# Patient Record
Sex: FEMALE
[De-identification: names, ages, dates, MRNs, and addresses within clinical notes are randomized per-mention and may not be internally consistent; named-entity substitution may affect disease eponyms.]

---

## 2020-01-08 ENCOUNTER — RX ONLY (OUTPATIENT)
Age: 80
Setting detail: RX ONLY
End: 2020-01-08

## 2021-03-03 PROBLEM — Z12.11 COLON CANCER SCREENING: Status: ACTIVE | Noted: 2021-03-03

## 2021-03-03 PROBLEM — Z00.00 MEDICARE ANNUAL WELLNESS VISIT, SUBSEQUENT: Status: ACTIVE | Noted: 2021-03-03

## 2021-03-03 PROBLEM — Z23 ENCOUNTER FOR IMMUNIZATION: Status: ACTIVE | Noted: 2021-03-03

## 2021-04-02 PROBLEM — Z00.00 MEDICARE ANNUAL WELLNESS VISIT, SUBSEQUENT: Status: RESOLVED | Noted: 2021-03-03 | Resolved: 2021-04-02

## 2021-06-03 PROBLEM — Z12.39 BREAST CANCER SCREENING: Status: ACTIVE | Noted: 2021-06-03

## 2021-06-03 PROBLEM — I87.2 VENOUS INSUFFICIENCY OF BOTH LOWER EXTREMITIES: Status: ACTIVE | Noted: 2021-06-03

## 2021-07-08 ENCOUNTER — HOSPITAL ENCOUNTER (OUTPATIENT)
Dept: MAMMOGRAPHY | Age: 81
Discharge: HOME | End: 2021-07-08
Attending: INTERNAL MEDICINE
Payer: MEDICARE

## 2021-07-08 DIAGNOSIS — Z12.31 ENCOUNTER FOR SCREENING MAMMOGRAM FOR MALIGNANT NEOPLASM OF BREAST: ICD-10-CM

## 2021-07-08 PROCEDURE — 77063 BREAST TOMOSYNTHESIS BI: CPT

## 2021-12-21 ENCOUNTER — RX ONLY (OUTPATIENT)
Age: 81
Setting detail: RX ONLY
End: 2021-12-21

## 2022-03-18 PROBLEM — Z23 ENCOUNTER FOR IMMUNIZATION: Status: ACTIVE | Noted: 2021-03-03

## 2022-03-18 PROBLEM — Z12.11 COLON CANCER SCREENING: Status: ACTIVE | Noted: 2021-03-03

## 2022-03-19 PROBLEM — I87.2 VENOUS INSUFFICIENCY OF BOTH LOWER EXTREMITIES: Status: ACTIVE | Noted: 2021-06-03

## 2022-03-20 PROBLEM — Z12.39 BREAST CANCER SCREENING: Status: ACTIVE | Noted: 2021-06-03

## 2022-04-11 PROBLEM — Z00.00 MEDICARE ANNUAL WELLNESS VISIT, SUBSEQUENT: Status: ACTIVE | Noted: 2021-03-03

## 2022-04-28 ENCOUNTER — HOSPITAL ENCOUNTER (OUTPATIENT)
Dept: GENERAL RADIOLOGY | Age: 82
Discharge: HOME | End: 2022-04-28
Payer: MEDICARE

## 2022-04-28 DIAGNOSIS — G89.29 CHRONIC BILATERAL LOW BACK PAIN WITHOUT SCIATICA: ICD-10-CM

## 2022-04-28 DIAGNOSIS — M54.50 CHRONIC BILATERAL LOW BACK PAIN WITHOUT SCIATICA: ICD-10-CM

## 2022-04-28 PROCEDURE — 72100 X-RAY EXAM L-S SPINE 2/3 VWS: CPT

## 2022-05-02 ENCOUNTER — HOSPITAL ENCOUNTER (OUTPATIENT)
Dept: PHYSICAL THERAPY | Age: 82
Setting detail: RECURRING SERIES
Discharge: HOME | End: 2022-05-05
Payer: MEDICARE

## 2022-05-03 DIAGNOSIS — E03.9 ACQUIRED HYPOTHYROIDISM: ICD-10-CM

## 2022-05-03 DIAGNOSIS — E78.2 MIXED HYPERLIPIDEMIA: Primary | ICD-10-CM

## 2022-05-03 DIAGNOSIS — E11.9 TYPE 2 DIABETES MELLITUS WITHOUT COMPLICATION, WITHOUT LONG-TERM CURRENT USE OF INSULIN (HCC): ICD-10-CM

## 2022-05-07 PROBLEM — Z00.00 MEDICARE ANNUAL WELLNESS VISIT, SUBSEQUENT: Status: RESOLVED | Noted: 2021-03-03 | Resolved: 2022-05-07

## 2022-05-12 ENCOUNTER — HOSPITAL ENCOUNTER (OUTPATIENT)
Dept: PHYSICAL THERAPY | Age: 82
Discharge: HOME | End: 2022-05-12
Payer: MEDICARE

## 2022-05-12 DIAGNOSIS — G89.29 CHRONIC BILATERAL LOW BACK PAIN WITHOUT SCIATICA: ICD-10-CM

## 2022-05-12 DIAGNOSIS — M54.50 CHRONIC BILATERAL LOW BACK PAIN WITHOUT SCIATICA: ICD-10-CM

## 2022-05-12 PROCEDURE — 97110 THERAPEUTIC EXERCISES: CPT

## 2022-05-12 PROCEDURE — 97162 PT EVAL MOD COMPLEX 30 MIN: CPT

## 2022-05-12 NOTE — THERAPY EVALUATION
Raisa Espitia  : 1940      [place] / [place] / Product Type: Medicare /  [employer] [facility] [address]. [facility], [address], [facility], [address] [place] [address]  [phone]   [phone]       OUTPATIENT PHYSICAL THERAPY:Initial Assessment 2022      ICD-10: Treatment Diagnosis:   Low back pain (M54.5)  Muscle Weakness, Generalized (M62.81)                 Precautions/Allergies:   Codeine   Fall Risk Score: 1 (? 5 = High Risk)  MD Orders: Eval and Treat  MEDICAL/REFERRING DIAGNOSIS:  Chronic bilateral low back pain without sciatica [M54.50, G89.29]   DATE OF ONSET: ~6 months ago  REFERRING PHYSICIAN: Chen Sood MD  RETURN PHYSICIAN APPOINTMENT: TBD      INITIAL ASSESSMENT:  Ms. Raisa Espitia presents to physical therapy with decreased postural and hip/core strength, ROM, joint mobility, flexibility, functional mobility, and increased pain. No pelvic malalignment upon initial evaluation but decreased posture. These S/S are consistent with referring diagnosis. Raisa Espitia will benefit from skilled physical therapy (medically necessary) to address above deficits affecting participation in basic ADLs and functional mobility/tolerance. Patient will benefit from manual therapeutic techniques (stretching, joint mobilizations, soft tissue mobilization/myofascial release), therapeutic exercises and activities, postural strengthening/education, and comprehensive home exercises program to address current impairments and functional limitations. PROBLEM LIST (Impacting functional limitations):  1. Decreased Strength  2. Decreased ADL/Functional Activities  3. Decreased Transfer Abilities  4. Decreased Ambulation Ability/Technique  5. Increased Pain  6. Decreased Flexibility/Joint Mobility  7. Decreased [place] with Home Exercise Program INTERVENTIONS PLANNED:  1. Balance Exercise  2.  Bed Mobility  3. Cold  4. Cryotherapy  5. Electrical Stimulation  6. Family Education  7. Gait Training  8. Heat  9. Home Exercise Program (HEP)  10. Manual Therapy  11. Neuromuscular Re-education/Strengthening  12. Range of Motion (ROM)  13. Therapeutic Activites  14. Therapeutic Exercise/Strengthening  15. Transfer Training  16. Lumbar Traction  17. Aquatic Therapy   TREATMENT PLAN:  Effective Dates: 5/12/2022 TO 7/11/2022 (60 days). Frequency/Duration: 2 times a week for 60 Days  GOALS: (Goals have been discussed and agreed upon with patient.)     Short-Term Goals~4 weeks  Goal Met   1. Raisa Espitia will be independent with HEP 1.  [] Date:   2. Raisa Espitia will participate in LE stretching program to increase flexibility    2. [] Date:   3. Raisa Espitia will participate in core stabilization exercises to help with stabilization during ADLs 3. [] Date:   4. Raisa Espitia will participate in LE strengthening program with weights/resistance as appropriate to help with gait and elevations 4. [] Date:   5.  5.  [] Date:   6.  6.  [] Date:              Long Term Goals~8 weeks Goal Met   1. Raisa Espitia will demonstrate a 8 point improvement on the Oswestry to show improvement in function 1. [] Date:   2. Raisa Espitia will report 0/10 pain at rest and during ADLs  2. [] Date:   3. Pt will perform at least 15 STS in 30 seconds to demonstrate improved functional strength. 3.  [] Date:   4. Pt will report playing tennis for ~1 hour with no more than 1/10 pain in low back. 4.  [] Date:                 Outcome Measure: Tool Used: Modified Oswestry Low Back Pain Questionnaire  Score:  Initial: 12/50  Most Recent: X/50 (Date: -- )   Interpretation of Score: Each section is scored on a 0-5 scale, 5 representing the greatest disability. The scores of each section are added together for a total score of 50.       Medical Necessity:   · Skilled intervention continues to be required due to above deficits affecting participation in basic ADLs and overall functional tolerance. Reason for Services/Other Comments:  · Patient continues to require skilled intervention due to  above deficits affecting participation in basic ADLs and overall functional tolerance. Total Treatment Duration:  PT Patient Time In/Time Out  Time In: 1430  Time Out: 1525    Rehabilitation Potential For Stated Goals: GOOD  Regarding Rafael Mchugh's therapy, I certify that the treatment plan above will be carried out by a therapist or under their direction. Thank you for this referral,  Alexandra Griffith, PT     Referring Physician Signature: Chen Sood MD                        HISTORY:   History of Present Injury/Illness (Reason for Referral):       Pt reports ~6 month history of low back pain. She wonders if it's related to starting metformin. She describes the pain as a \"constant, dull ache\" and that her back will feel \"tight\" at times. Pt also reports pain into her posterior thighs that she describes as a tightness when she runs for tennis (plays doubles non-competitively). Pt has a history of lumbar fusion in 2004 and she states that her pain is completely different than it was prior to surgery. She has been managing her pain with a hot pack and a soft back brace when doing heavier activities (yard work, tennis).     -Present symptoms/complaints (on day of evaluation)  Pain Scale:   · Worst: 5/10      · Aggravating factors: Standing, Walking and doing yard/housework, playing tennis   · Relieving factors: Rest and Heat  · Irritability: Medium (Onset of pain is equal to alleviation)      Past Medical History/Comorbidities:   Ms. Mariza Gallo  has a past medical history of Acne rosacea, CKD (chronic kidney disease) stage 3, GFR 30-59 ml/min ([facility] (4/12/2013), Coronary atherosclerosis of native coronary artery (04/12/2013), Diabetes mellitus, type 2 ([facility] History of non-ST elevation myocardial infarction (NSTEMI) (04/12/2013), History of unstable angina (09/23/2015), Hyperlipidemia (04/12/2013), Hypertension, Hypothyroid (4/12/2013), Osteoarthritis, PAF (paroxysmal atrial fibrillation) ([facility] (4/22/2013), and S/P CABG (coronary artery bypass graft) (4/16/2013). Ms. Addie Pyle  has a past surgical history that includes hx lumbar fusion; hx orthopaedic; hx heent; and hx coronary artery bypass graft (4/16/13). Social History/Living Environment:        Social History     Socioeconomic History    Marital status:      Spouse name: Not on file    Number of children: Not on file    Years of education: Not on file    Highest education level: Not on file   Occupational History    Not on file   Tobacco Use    Smoking status: Never Smoker    Smokeless tobacco: Never Used   Substance and Sexual Activity    Alcohol use: Yes     Alcohol/week: 1.7 standard drinks     Types: 1 Cans of beer, 1 Shots of liquor per week     Comment: social    Drug use: Never    Sexual activity: Not on file   Other Topics Concern    Not on file   Social History Narrative    Not on file     Social Determinants of Health     Financial Resource Strain:     Difficulty of Paying Living Expenses: Not on file   Food Insecurity:     Worried About Running Out of Food in the Last Year: Not on file    Richard of Food in the Last Year: Not on file   Transportation Needs:     Lack of Transportation (Medical): Not on file    Lack of Transportation (Non-Medical):  Not on file   Physical Activity:     Days of Exercise per Week: Not on file    Minutes of Exercise per Session: Not on file   Stress:     Feeling of Stress : Not on file   Social Connections:     Frequency of Communication with Friends and Family: Not on file    Frequency of Social Gatherings with Friends and Family: Not on file    Attends Anglican Services: Not on file    Active Member of Clubs or Organizations: Not on file    Attends Club or Organization Meetings: Not on file   [place] Marital Status: Not on file   Intimate Partner Violence:     Fear of Current or Ex-Partner: Not on file    Emotionally Abused: Not on file    Physically Abused: Not on file    Sexually Abused: Not on file   Housing Stability:     Unable to Pay for Housing in the Last Year: Not on file    Number of [place] in the Last Year: Not on file    Unstable Housing in the Last Year: Not on file     Prior Level of Function/Work/Activity:  Normal  Previous Treatment Approach  none  Dominant Side: Right  Other Clinical Tests  X-RAY Positive for arthritis per MD note    Active Ambulatory Problems     Diagnosis Date Noted    Coronary atherosclerosis of native coronary artery 04/12/2013    Stage 3a chronic kidney disease [facility] 04/12/2013    Hypothyroid 04/12/2013    Diabetes mellitus, type 2 ([facility] 04/18/2013    Paroxysmal atrial fibrillation ([facility] 04/22/2013    Hyperlipidemia 08/04/2016    Hypertension     Chronic bilateral low back pain without sciatica     CAD (coronary artery disease)     Acne rosacea     Encounter for immunization 03/03/2021    Colon cancer screening 03/03/2021    Medicare annual wellness visit, subsequent 03/03/2021    Venous insufficiency of both lower extremities 06/03/2021    Breast cancer screening 06/03/2021     Resolved Ambulatory Problems     Diagnosis Date Noted    HTN (hypertension) 04/12/2013    NSTEMI (non-ST elevated myocardial infarction) ([facility] 04/12/2013    Encounter for weaning from ventilator ([facility] 04/16/2013    S/P CABG (coronary artery bypass graft) 04/16/2013    Hypoxemia 04/16/2013    Thrombocytopenia due to blood loss 04/17/2013    Phlebitis after infusion- RUE >LUE 04/21/2013    Unstable angina ([facility] 09/23/2015    Arthritis      Past Medical History:   Diagnosis Date    CKD (chronic kidney disease) stage 3, GFR 30-59 ml/min ([facility] 4/12/2013    History of non-ST elevation myocardial infarction (NSTEMI) 04/12/2013    History of unstable angina 09/23/2015  Osteoarthritis     PA[facility]oxysmal [facility] fibrillation) [facility] 4/22/2013     Note: Patient denies any increase of symptoms with cough, sneeze or valsalva. Patient denies any saddle paresthesia or bowel/bladder deficits. Current Medications:    Current Outpatient Medications:     losartan (COZAAR) 100 mg tablet, Take 1 Tablet by mouth daily. , Disp: 90 Tablet, Rfl: 3    methylPREDNISolone (MEDROL DOSEPACK) 4 mg tablet, Use as directed, Disp: 1 Dose Pack, Rfl: 0    cyclobenzaprine (FLEXERIL) 5 mg tablet, Take 1 Tablet by mouth daily as needed for Muscle Spasm(s). , Disp: 30 Tablet, Rfl: 5    meloxicam (MOBIC) 7.5 mg tablet, Take 1 Tablet by mouth daily as needed for Pain., Disp: 30 Tablet, Rfl: 0    metFORMIN ER (GLUCOPHAGE XR) 500 mg tablet, TAKE 1 TABLET BY MOUTH EVERY DAY WITH DINNER, Disp: 90 Tablet, Rfl: 1    levothyroxine (SYNTHROID) 50 mcg tablet, TAKE 1 TABLET BY MOUTH EVERY DAY BEFORE BREAKFAST, Disp: 90 Tablet, Rfl: 1    apixaban (ELIQUIS) 5 mg tablet, Take 1 Tablet by mouth two (2) times a day., Disp: 180 Tablet, Rfl: 1    atorvastatin (LIPITOR) 40 mg tablet, Take 1 Tablet by mouth daily. Replaces Rosuvastatin, Disp: 90 Tablet, Rfl: 3    metoprolol succinate (TOPROL-XL) 50 mg XL tablet, Take 1 Tablet by mouth daily. , Disp: 90 Tablet, Rfl: 3    nitroglycerin (NITROSTAT) 0.4 mg SL tablet, 1 Tablet by SubLINGual route every five (5) minutes as needed for Chest Pain., Disp: 1 Each, Rfl: 0    furosemide (LASIX) 20 mg tablet, TAKE 1 TABLET BY MOUTH DAILY AS NEEDED (LEG SWELLING). , Disp: 30 Tablet, Rfl: 1      Ambulatory/Rehab Services H2 Model Falls Risk Assessment    Risk Factors:       No Risk Factors Identified Ability to Rise from Chair:       (1)  Pushes up, successful in one attempt    Falls Prevention Plan:       No modifications necessary   Total: (5 or greater = High Risk): 1    ©2010 [facility]. All Rights Reserved. [facility] Patent #[id number].  [employer] Law prohibits the replication, distribution or use without written permission from [employer] of [address] [place] [address]       Date Last Reviewed:  5/12/2022   Number of Personal Factors/Comorbidities that affect the Plan of Care: 1-2: MODERATE COMPLEXITY   EXAMINATION:   Observation/Orthostatic Postural Assessment:           Forward Head, Rounded Shoulders and Thoracic Kyphosis  Palpation:          Increased Tenderness and tension to B lumbar paraspinals    ROM:            AROM/PROM      Joint: Initial Assessment: 5/12/2022   Active ROM (deg) RIGHT LEFT   Knee Extension WNL WNL   Knee Flexion WNL WNL   Hip Flexion (PROM) ~100 deg ~100 deg   Hip IR (PROM) ~10 deg ~10 deg   Hip ER (PROM) ~35 deg ~35 deg     Lumbar ROM     Movement Range Descriptor Degrees Notes   Flexion       Extension       Right Sidebending       Left Sidebending       R Rotation ~75%, pain-free      Left Rotation ~75%, pain-free          Repeated Motion: not performed due to no neurogenic symptoms  Direction    Frequency Symptoms Prior Symptons Post   Flexion      Extension            Strength:    Initial Assessment:5/12/2022       RIGHT LEFT   Knee Flexion (L5-S2)  4/5 4 /5, with posterior thigh pain   Knee Extension (L3, L4) 5 /5  5/5   Hip Flexion (L1, L2) 3+ /5 4- /5   Hip Extension 3+ /5 3+ /5   Hip Abduction (L5, S1) 3+ /5  3+/5   Ankle Dorsiflexion (L4) 5 /5 5 /5   Great Toe Extension (L5)  /5  /5   Ankle Plantar Flexion (S1-S2)  /5  /5       Special Tests:  Lumbar:  SLR: Negative with increased muscular tightness bilaterally  Positive Ely's bilaterally (knees ~100 deg in prone)   SI Joint:  Not Tested   Hip:  Not Tested         Manual: no joint assessment performed due to hx of fusion  Initial Assessment  5/12/2022     Joint/Area Directon Grade Treatment Effect                         Reflex Testing:  Not performed  Location Left Right   Patellar (L4)     Achilles (S1)         Neurological Screen:    RADIATING SYMPTOMS: No  Upper motor [place] screen         Clonus: Not Indicated         Babinski: Not Indicated    Functional Mobility:  Affecting participation in basic ADLs and functional tasks. Balance and Mobility:    Test Result   Timed up and Go    30 second Sit to Stand 12 seconds   6 Minute Walk Test    Single Leg Balance           Body Structures Involved:  1. Bones  2. Joints  3. Muscles  4. Ligaments Body Functions Affected:  1. Sensory/Pain  2. Neuromusculoskeletal  3. Movement Related Activities and Participation Affected:  1. Mobility  2.  Self Care   Number of elements that affect the Plan of Care: 3: MODERATE COMPLEXITY   CLINICAL PRESENTATION:   Presentation: Evolving clinical presentation with changing clinical characteristics: MODERATE COMPLEXITY   CLINICAL DECISION MAKING:      Use of outcome tool(s) and clinical judgement create a POC that gives a: Questionable prediction of patient's progress: MODERATE COMPLEXITY     See associated treatment note for treatment provided today      Future Appointments   Date Time Provider Stefani Verma   5/16/2022 10:30 AM Justino Watkins, PT [facility]   5/18/2022  1:45 PM Molly Howard, PT [facility]         Blank Hansen PT

## 2022-05-12 NOTE — PROGRESS NOTES
Yaritza Tirado  : 1940  [place] / Product Type: Medicare /  [id number] [facility]. [address] [place] [address] [address] [place] [address], [facility], [address] [place] [address]  [phone]   [phone]                                                          Radha Abreu MD      OUTPATIENT PHYSICAL THERAPY: Daily Treatment Note 2022 Visit Count:  1    Tx Diagnosis:  Low back pain (M54.5)  Muscle Weakness, Generalized (M62.81)      Pre-treatment Symptoms/Complaints: See Initial Eval Dated 22 for more details. Pain: Initial:5/10  Medications Last Reviewed:  2022     Post Session: 0/10   Updated Objective Findings: See Initial Eval for more details. TREATMENT:   THERAPEUTIC EXERCISE: (24 minutes):  Exercises per grid below to improve mobility, strength and balance. Required minimal visual, verbal and manual cues to promote proper body alignment and promote proper body posture. Progressed resistance and complexity of movement as indicated. Date:  22 Date:   Date:     Activity/Exercise Parameters Parameters Parameters   Education HEP, POC, PT goals, anatomy/pathology, diagnosis,      Lower trunk rotation 3 min     glute bridges 3x10     sidestepping 2x40 feet, red band     Chair taps 3x5                       THERAPEUTIC ACTIVITY: ( 0 minutes): Activities per gid below to improve functional movement related mobility, strength and balance to improve neuro-muscular carryover to daily functional activities for improving patient's quality of life. Required visual, verbal and manual cues to promote proper body alignment and promote proper body posture/mechanics. Progressed resistance and complexity of movement as indicated.      Date:   Date:   Date:     Activity/Exercise Parameters Parameters Parameters                                                                               MANUAL THERAPY: (0 minutes): Joint mobilization, Soft tissue mobilization was utilized and necessary because of the patient's restricted joint motion and restricted motion of soft tissue mobility. Date  5/12/2022    Technique Used Grade  Level # Time(s) Effect while being performed                                                                 HEP Log Date 1. See above 5/12/22   2.     3.    4.    5.           [employer] Portal  Treatment/Session Summary:    Response to Treatment: Pt demonstrated understanding of POC and initial HEP. No increase in pain or adverse reactions. Communication/Consultation:  POC, HEP, PT goals, Faxed initial evaluation to MD.   Equipment provided today: HEP Handout   Recommendations/Intent for next treatment session:   Next visit will focus on Core Stability Hip strengthening. Treatment Plan of Care Effective Dates: 5/12/2022 TO 7/11/2022 (60 days). Frequency/Duration: 2 times a week for 60 Days             Total Treatment Billable Duration:   24  Rx plus Eval   PT Patient Time In/Time Out  Time In: 1430  Time Out: [address], PT    No future appointments.

## 2022-05-16 ENCOUNTER — HOSPITAL ENCOUNTER (OUTPATIENT)
Dept: PHYSICAL THERAPY | Age: 82
Discharge: HOME | End: 2022-05-16
Payer: MEDICARE

## 2022-05-16 PROCEDURE — 97110 THERAPEUTIC EXERCISES: CPT

## 2022-05-18 ENCOUNTER — HOSPITAL ENCOUNTER (OUTPATIENT)
Dept: PHYSICAL THERAPY | Age: 82
Discharge: HOME | End: 2022-05-18
Payer: MEDICARE

## 2022-05-18 PROCEDURE — 97110 THERAPEUTIC EXERCISES: CPT

## 2022-05-23 ENCOUNTER — HOSPITAL ENCOUNTER (OUTPATIENT)
Dept: PHYSICAL THERAPY | Age: 82
Setting detail: RECURRING SERIES
Discharge: HOME | End: 2022-05-26
Payer: MEDICARE

## 2022-05-23 PROCEDURE — 97530 THERAPEUTIC ACTIVITIES: CPT

## 2022-05-23 PROCEDURE — 97110 THERAPEUTIC EXERCISES: CPT

## 2022-05-23 NOTE — PROGRESS NOTES
Yvonne Chu  : 1940  Primary: Medicare Part A And B  Secondary: [address] [facility] [address]  Phone: [phone]  Fax: [phone] No data recorded  No data recorded    PT Visit Info:    No data recorded    OUTPATIENT PHYSICAL THERAPY:OP NOTE TYPE: Treatment Note 2022     Appt Desk   Episode      Treatment Diagnosis: Low back pain (M54.5)  Muscle Weakness, Generalized (M62.81)    Medical/Referring Diagnosis:  No admission diagnoses are documented for this encounter. Referring Physician:  Cristina Cobb MD MD Orders:  PT Eval and Treat   Date of Onset:  No data recorded   Allergies:  Codeine  Restrictions/Precautions:    No data recordedNo data recorded   Interventions Planned (Treatment may consist of any combination of the following):    No data recorded   Subjective Comments:  pt with no specific reported changes. pt reports she plans on playing tennis a few times this week. Initial:  does not rate    /10 Post Session:  does not rate    /10  Medications Last Reviewed:  2022  Updated Objective Findings:  None Today  Treatment   THERAPEUTIC EXERCISE: ( 30 minutes):  Exercises per grid below to improve mobility, strength and balance. Required minimal visual, verbal and manual cues to promote proper body alignment and promote proper body posture. Progressed resistance and complexity of movement as indicated.        Date:  22 Date:  22 Date:  22 Date:  22   Activity/Exercise Parameters Parameters Parameters    Education HEP, POC, PT goals, anatomy/pathology, diagnosis,         Lower trunk rotation 3 min        glute bridges 3x10        Standing L stretch   2 min      Sidestepping and monster walks 2x40 feet, red band 2x40 feet, red band 2x40 feet, pink band 3x15 feet, pink band   Chair taps 3x5 3x10 [id number] lbs 10x   sci fit   8 min, level 4 8 min, random level 3, 40 kcal 8 min, random level 3, 47 kcal   Sled push/pull   3x40 feet, 50 lbs 4x40 feet, 50 lbs    Step ups          Shuttle   3x10, 2 cords DL      Pallof press   3x10x7 lbs      Rows   [id number] lbs [id number] lbs DB rows  8 lb, 38 in surface  3 x 10 reps   Open books   x15/side      Lat pulldowns     x30x27 lbs 30 lb  3 x 10  reps   Lateral cable pulls     X5/side, 17 lbs             THERAPEUTIC ACTIVITY: ( 25 minutes): Activities per gid below to improve functional movement related mobility, strength and balance to improve neuro-muscular carryover to daily functional activities for improving patient's quality of life. Required visual, verbal and manual cues to promote proper body alignment and promote proper body posture/mechanics. Progressed resistance and complexity of movement as indicated. Date:   5/23/22 Date:    Date:      Activity/Exercise Parameters Parameters Parameters   Sit to stand 18 in surface,5lb weight  3 x 5 reps  ·   ·     Rack pull 10 lb  3 x 5 reps  Mod to max VC for technique, instruction, and rationale ·   ·     ·   ·   ·   ·     ·   ·   ·   ·     ·   ·   ·   ·     ·   ·   ·   ·     ·   ·   ·   ·              MANUAL THERAPY: (0 minutes): Joint mobilization, Soft tissue mobilization was utilized and necessary because of the patient's restricted joint motion and restricted motion of soft tissue mobility. Date  5/18/2022     Technique Used Grade  Level # Time(s) Effect while being performed                                                                                                      HEP Log Date   1. See above 5/12/22   2.      3.     4.     5.             Treatment/Session Summary:    · Treatment Assessment:   pt will continue to require further instruction on rack pull to improve hip hinge and decrease B knee valgus. pt responds well to generalizing exercises to functional activitites in her daily routine.  pt is responds well to use of visual targets and tactile cues to increase scapular stabilizer adn thoracic paraspinal recruitment. · Communication/Consultation:  None today  · Equipment provided today:  None  · Recommendations/Intent for next treatment session: Next visit will focus on Core Stability Hip strengthening. .    Treatment Plan of Care Effective Dates: 5/18/2022 TO 7/11/2022 (60 days).   Frequency/Duration: 2 times a week for 60 Days       Total Treatment Billable Duration:  55 minutes 5 min un-timed due to rest  Time In: 1100  Time Out: 1200    Meeta Cao, PT       Post Session Pain  Charge Capture  [facility] Portal  MD Guidelines  Scanned Media  Benefits  MyChart

## 2022-05-25 ENCOUNTER — HOSPITAL ENCOUNTER (OUTPATIENT)
Dept: PHYSICAL THERAPY | Age: 82
Setting detail: RECURRING SERIES
Discharge: HOME | End: 2022-05-28
Payer: MEDICARE

## 2022-05-25 PROCEDURE — 97110 THERAPEUTIC EXERCISES: CPT

## 2022-05-25 PROCEDURE — 97530 THERAPEUTIC ACTIVITIES: CPT

## 2022-05-25 NOTE — PROGRESS NOTES
Finesse Rojas  : 1940  Primary: Medicare Part A And B  Secondary: [address] [facility] [address]  Phone: [phone]  Fax: [phone] No data recorded  No data recorded    PT Visit Info:    No data recorded    OUTPATIENT PHYSICAL THERAPY:OP NOTE TYPE: Treatment Note 2022     Appt Desk   Episode      Treatment Diagnosis: Low back pain (M54.5)  Muscle Weakness, Generalized (M62.81)    Medical/Referring Diagnosis:  Low back pain, unspecified [M54.50]  Referring Physician:  Bhanu Villegas MD MD Orders:  PT Eval and Treat   Date of Onset:  No data recorded   Allergies:  Codeine  Restrictions/Precautions:    No data recordedNo data recorded   Interventions Planned (Treatment may consist of any combination of the following):    No data recorded   Subjective Comments:  Played tennis today and yesterday. Running was a bit difficult due to the \"tightening\" in the back of her legs. Initial:  does not rate    /10 Post Session:  does not rate    /10  Medications Last Reviewed:  2022  Updated Objective Findings:  None Today  Treatment   THERAPEUTIC EXERCISE: ( 30 minutes):  Exercises per grid below to improve mobility, strength and balance. Required minimal visual, verbal and manual cues to promote proper body alignment and promote proper body posture. Progressed resistance and complexity of movement as indicated.        Date:  22 Date:  22 Date:  22 Date:  22 Date  22   Activity/Exercise Parameters Parameters Parameters     Education HEP, POC, PT goals, anatomy/pathology, diagnosis,          Lower trunk rotation 3 min         glute bridges 3x10         Standing L stretch   2 min       Sidestepping and monster walks 2x40 feet, red band 2x40 feet, red band 2x40 feet, pink band 3x15 feet, pink band 2x40 feet, pink band, holding 8 lbs   Chair taps 3x5 3x10 7q14n87 lbs 10x    sci fit   8 min, level 4 8 min, random level 3, 40 kcal 8 min, random level 3, 47 kcal 8 min, random level 3, 42 kcal   Sled push/pull   3x40 feet, 50 lbs 4x40 feet, 50 lbs     Step ups           Shuttle   3x10, 2 cords DL       Pallof press   3x10x7 lbs       Rows   4h99s55 lbs 1o99b45 lbs DB rows  8 lb, 38 in surface  3 x 10 reps 2o84t12 lbs   Open books   x15/side       Lat pulldowns     x30x27 lbs 30 lb  3 x 10  reps 30 lb  3 x 10  reps   Lateral cable pulls     X5/side, 17 lbs              THERAPEUTIC ACTIVITY: ( 15 minutes): Activities per gid below to improve functional movement related mobility, strength and balance to improve neuro-muscular carryover to daily functional activities for improving patient's quality of life. Required visual, verbal and manual cues to promote proper body alignment and promote proper body posture/mechanics. Progressed resistance and complexity of movement as indicated. Date:   5/23/22 Date:  5/25/22  Date:      Activity/Exercise Parameters Parameters Parameters   Sit to stand 18 in surface,5lb weight  3 x 5 reps  · 3x10x5 lbs, yellow band around knees for cueing ·     Rack pull 10 lb  3 x 5 reps  Mod to max VC for technique, instruction, and rationale X30, training bar only ·     ·   ·   ·   ·     ·   ·   ·   ·     ·   ·   ·   ·     ·   ·   ·   ·     ·   ·   ·   ·              MANUAL THERAPY: (0 minutes): Joint mobilization, Soft tissue mobilization was utilized and necessary because of the patient's restricted joint motion and restricted motion of soft tissue mobility. Date  5/18/2022     Technique Used Grade  Level # Time(s) Effect while being performed                                                                                                      HEP Log Date   1. See above 5/12/22   2.      3.     4.     5.             Treatment/Session Summary:    · Treatment Assessment:   Worked on rack pulls today again.  Required verbal cueing for proper hip hinge pattern but demonstrated improved performance following correction. Pt should be appropriate to progress resistance for rack pulls next session. · Communication/Consultation:  None today  · Equipment provided today:  None  · Recommendations/Intent for next treatment session: Next visit will focus on Core Stability Hip strengthening. .    Treatment Plan of Care Effective Dates: 5/18/2022 TO 7/11/2022 (60 days).   Frequency/Duration: 2 times a week for 60 Days       Total Treatment Billable Duration:  45 minutes   Time In: 6645  Time Out: [address] [facility], PT       Post Session Pain  Charge Capture  [employer] Portal  MD Guidelines  Scanned Media  Benefits  MyChart

## 2022-06-02 ENCOUNTER — HOSPITAL ENCOUNTER (OUTPATIENT)
Dept: PHYSICAL THERAPY | Age: 82
Setting detail: RECURRING SERIES
Discharge: HOME | End: 2022-06-05
Payer: MEDICARE

## 2022-06-02 PROCEDURE — 97530 THERAPEUTIC ACTIVITIES: CPT

## 2022-06-02 PROCEDURE — 97110 THERAPEUTIC EXERCISES: CPT

## 2022-06-02 NOTE — PROGRESS NOTES
Rochelle Larry  : 1940  Primary: Medicare Part A [place] B  Secondary: [address] [facility] [address]  Phone: [phone]  Fax: [phone] No data recorded  No data recorded    PT Visit Info:    No data recorded    OUTPATIENT PHYSICAL THERAPY:OP NOTE TYPE: Treatment Note 2022     Appt Desk   Episode      Treatment Diagnosis: Low back pain (M54.5)  Muscle Weakness, Generalized (M62.81)    Medical/Referring Diagnosis:  Low back pain, unspecified [M54.50]  Referring Physician:  Ry Mccormick MD MD Orders:  PT Eval and Treat   Date of Onset:  No data recorded   Allergies:  Codeine  Restrictions/Precautions:    No data recordedNo data recorded   Interventions Planned (Treatment may consist of any combination of the following):    No data recorded   Subjective Comments:   \"A little achy today\" because she's been washing windows. Initial:  does not rate    /10 Post Session:  does not rate    /10  Medications Last Reviewed:  2022  Updated Objective Findings:  None Today  Treatment   THERAPEUTIC EXERCISE: ( 30 minutes):  Exercises per grid below to improve mobility, strength and balance. Required minimal visual, verbal and manual cues to promote proper body alignment and promote proper body posture. Progressed resistance and complexity of movement as indicated.        Date:  22 Date:  22 Date:  22 Date:  22 Date  22 Date  22   Activity/Exercise Parameters Parameters Parameters      Education HEP, POC, PT goals, anatomy/pathology, diagnosis,           Lower trunk rotation 3 min          glute bridges 3x10          Standing L stretch   2 min        Sidestepping and monster walks 2x40 feet, red band 2x40 feet, red band 2x40 feet, pink band 3x15 feet, pink band 2x40 feet, pink band, holding 8 lbs 2x40 feet, pink band, holding 8 lbs   Chair taps 3x5 3x10 0g59k41 lbs 10x     sci fit   8 min, level 4 8 min, random level 3, 40 kcal 8 min, random level 3, 47 kcal 8 min, random level 3, 42 kcal 8 min, random level 3, 42 kcal   Sled push/pull   3x40 feet, 50 lbs 4x40 feet, 50 lbs      Step ups            Shuttle   3x10, 2 cords DL        Pallof press   3x10x7 lbs     3x10x7 lb   Rows   9i26w19 lbs 2q53m88 lbs DB rows  8 lb, 38 in surface  3 x 10 reps 0o15g45 lbs 5d05x28 lbs   Open books   x15/side        Lat pulldowns     x30x27 lbs 30 lb  3 x 10  reps 30 lb  3 x 10  reps 3c11u58 lbs   Lateral cable pulls     X5/side, 17 lbs   X5/side, 17 lbs            THERAPEUTIC ACTIVITY: ( 15 minutes): Activities per gid below to improve functional movement related mobility, strength and balance to improve neuro-muscular carryover to daily functional activities for improving patient's quality of life. Required visual, verbal and manual cues to promote proper body alignment and promote proper body posture/mechanics. Progressed resistance and complexity of movement as indicated. Date:   5/23/22 Date:  5/25/22  Date:   6/2/22   Activity/Exercise Parameters Parameters Parameters   Sit to stand 18 in surface,5lb weight  3 x 5 reps  · 3x10x5 lbs, yellow band around knees for cueing 2x10x5 lbs   Rack pull 10 lb  3 x 5 reps  Mod to max VC for technique, instruction, and rationale X30, training bar only 1m57e67 lbs   ·   ·   ·   ·     ·   ·   ·   ·     ·   ·   ·   ·     ·   ·   ·   ·     ·   ·   ·   ·              MANUAL THERAPY: (0 minutes): Joint mobilization, Soft tissue mobilization was utilized and necessary because of the patient's restricted joint motion and restricted motion of soft tissue mobility. Date  5/18/2022     Technique Used Grade  Level # Time(s) Effect while being performed                                                                                                      HEP Log Date   1.  See above 5/12/22   2.      3.     4.     5.             Treatment/Session Summary:    · Treatment Assessment:   Continued dynamic core stabilization exercises with good tolerance. Pt required intermittent cueing to decrease thoracic kyphosis and appeared to have improved her ability to come out of kyphosis compared to previous visits. · Communication/Consultation:  None today  · Equipment provided today:  None  · Recommendations/Intent for next treatment session: Next visit will focus on Core Stability Hip strengthening. .    Treatment Plan of Care Effective Dates: 5/18/2022 TO 7/11/2022 (60 days).   Frequency/Duration: 2 times a week for 60 Days       Total Treatment Billable Duration:  45 minutes   Time In: 1421  Time Out: [address], PT       Post Session Pain  Charge Capture  [employer] Portal  MD Guidelines  Scanned Media  Benefits  MyChart

## 2022-06-06 ENCOUNTER — HOSPITAL ENCOUNTER (OUTPATIENT)
Dept: PHYSICAL THERAPY | Age: 82
Setting detail: RECURRING SERIES
Discharge: HOME | End: 2022-06-09
Payer: MEDICARE

## 2022-06-06 PROCEDURE — 97110 THERAPEUTIC EXERCISES: CPT

## 2022-06-06 PROCEDURE — 97530 THERAPEUTIC ACTIVITIES: CPT

## 2022-06-06 NOTE — PROGRESS NOTES
band, holding 8 lbs 2x40 feet, pink band, holding 8 lbs 2x40 feet, pink band, holding 10 lbs   Chair taps 3x5 3x10 6f56p71 lbs 10x      sci fit   8 min, level 4 8 min, random level 3, 40 kcal 8 min, random level 3, 47 kcal 8 min, random level 3, 42 kcal 8 min, random level 3, 42 kcal 8 min, random level 3, 42 kcal   Sled push/pull   3x40 feet, 50 lbs 4x40 feet, 50 lbs       Step ups             Shuttle   3x10, 2 cords DL         Pallof press   3x10x7 lbs     3x10x7 lb    Rows   8i68p62 lbs 8d34m58 lbs DB rows  8 lb, 38 in surface  3 x 10 reps 4q17g54 lbs 8y57w54 lbs (bent over) against wall, 9l01c96 lbs   Open books   x15/side         Lat pulldowns     x30x27 lbs 30 lb  3 x 10  reps 30 lb  3 x 10  reps 1l36u84 lbs    Lateral cable pulls     X5/side, 17 lbs   X5/side, 17 lbs             THERAPEUTIC ACTIVITY: ( 15 minutes): Activities per gid below to improve functional movement related mobility, strength and balance to improve neuro-muscular carryover to daily functional activities for improving patient's quality of life. Required visual, verbal and manual cues to promote proper body alignment and promote proper body posture/mechanics. Progressed resistance and complexity of movement as indicated. Date:   5/23/22 Date:  5/25/22  Date:   6/2/22 Date  6/6/22   Activity/Exercise Parameters Parameters Parameters    Sit to stand 18 in surface,5lb weight  3 x 5 reps  · 3x10x5 lbs, yellow band around knees for cueing 2x10x5 lbs 2h96m91 lbs   Rack pull 10 lb  3 x 5 reps  Mod to max VC for technique, instruction, and rationale X30, training bar only 0w49l98 lbs    Deadlift ·   ·   ·   2z14g17 lbs   ·   ·   ·   ·   ·    ·   ·   ·   ·   ·    ·   ·   ·   ·   ·    ·   ·   ·   ·   ·             MANUAL THERAPY: (0 minutes): Joint mobilization, Soft tissue mobilization was utilized and necessary because of the patient's restricted joint motion and restricted motion of soft tissue mobility.             Date  5/18/2022 Technique Used Grade  Level # Time(s) Effect while being performed                                                                                                      HEP Log Date   1. See above 5/12/22   2.      3.     4.     5.             Treatment/Session Summary:    · Treatment Assessment:   Able to progress rack pulls to full deadlifts from the floor. Pt did require intermittent cueing for setting upper back but otherwise demonstrated neutral spinal alignment, though with baseline kyphosis. Pt also progressed lumbar muscular endurance with semi-supported bent over rows. No complaints of pain throughout session. Pt would continue to benefit from progressive resistance exercises, focusing on functional core strength and endurance. · Communication/Consultation:  None today  · Equipment provided today:  None  · Recommendations/Intent for next treatment session: Next visit will focus on Core Stability Hip strengthening. .    Treatment Plan of Care Effective Dates: 5/18/2022 TO 7/11/2022 (60 days).   Frequency/Duration: 2 times a week for 60 Days       Total Treatment Billable Duration:  40 minutes   Time In: 1430  Time Out: [address], PT       Post Session Pain  Charge Capture  [employer] Portal  MD Guidelines  Scanned Media  Benefits  MyChart

## 2022-06-07 ENCOUNTER — TELEPHONE (OUTPATIENT)
Dept: CARDIOLOGY CLINIC | Age: 82
End: 2022-06-07

## 2022-06-07 DIAGNOSIS — I48.0 PAROXYSMAL ATRIAL FIBRILLATION (HCC): Primary | ICD-10-CM

## 2022-06-07 RX ORDER — METOPROLOL SUCCINATE 100 MG/1
100 TABLET, EXTENDED RELEASE ORAL DAILY
Qty: 90 TABLET | Refills: 1 | Status: SHIPPED | OUTPATIENT
Start: 2022-06-07

## 2022-06-07 NOTE — TELEPHONE ENCOUNTER
Please call patient. Reviewed 2 week heart monitor. She has both atrial fibrillation and typical atrial flutter. Overall burden is 9% but when in these rhythms rate is not controlled. Please have her increase metoprolol to 100 mg, make sure she's had a TSH and CBC within the last 2 months, and I recommend an EP consultation to consider possible ablation of typical flutter and possibly of afib which will help limit and possibly negate some medications. She is a  and I think that may be the best way to keep her active. I last saw her in February so pls make sure she has follow up with me in a month or so if can't get in with EP for a while.  thanks

## 2022-06-07 NOTE — TELEPHONE ENCOUNTER
Reviewed Dr. Gina Arce recommendations with pt. Verb understanding. Pt will double up on her current metoprolol until it is done, then will get rx filled. Will get labs drawn this week.   Orders Placed This Encounter    CBC with Auto Differential     Standing Status:   Future     Standing Expiration Date:   6/7/2023    TSH     Standing Status:   Future     Standing Expiration Date:   6/7/2023    [address] [facility] Cardiology (Electrophysiology)Tg     Referral Priority:   Routine     Referral Type:   Eval and Treat     Referral Reason:   Specialty Services Required     Requested Specialty:   Cardiology     Number of Visits Requested:   1    metoprolol succinate (TOPROL XL) 100 MG extended release tablet     Sig: Take 1 tablet by mouth daily     Dispense:  90 tablet     Refill:  1

## 2022-06-09 ENCOUNTER — HOSPITAL ENCOUNTER (OUTPATIENT)
Dept: PHYSICAL THERAPY | Age: 82
Setting detail: RECURRING SERIES
Discharge: HOME | End: 2022-06-12
Payer: MEDICARE

## 2022-06-09 DIAGNOSIS — I48.0 PAROXYSMAL ATRIAL FIBRILLATION (HCC): ICD-10-CM

## 2022-06-09 PROCEDURE — 97110 THERAPEUTIC EXERCISES: CPT

## 2022-06-09 PROCEDURE — 97530 THERAPEUTIC ACTIVITIES: CPT

## 2022-06-09 NOTE — PROGRESS NOTES
Ashley West  : 1940  Primary: Medicare Part A And B  Secondary: [address] [facility] [address]  Phone: [phone]  Fax: [phone] No data recorded  No data recorded    PT Visit Info:    No data recorded    OUTPATIENT PHYSICAL THERAPY:OP NOTE TYPE: Treatment Note 2022     Appt Desk   Episode      Treatment Diagnosis: Low back pain (M54.5)  Muscle Weakness, Generalized (M62.81)    Medical/Referring Diagnosis:  Low back pain, unspecified [M54.50]  Referring Physician:  Veronica Anand MD MD Orders:  PT Eval and Treat   Date of Onset:  No data recorded   Allergies:  Codeine  Restrictions/Precautions:    No data recordedNo data recorded   Interventions Planned (Treatment may consist of any combination of the following):    No data recorded   Subjective Comments:   pt reporting that she played a 3 set tennis match yesterday. pt also reports that she is getting alot less pain with tennis and is having less issue with her back pain as it is no longer constant. Initial:  does not rate    /10 Post Session:  does not rate    /10  Medications Last Reviewed:  2022  Updated Objective Findings:  None Today  Treatment   THERAPEUTIC EXERCISE: ( 25 minutes):  Exercises per grid below to improve mobility, strength and balance. Required minimal visual, verbal and manual cues to promote proper body alignment and promote proper body posture. Progressed resistance and complexity of movement as indicated.        Date:  22 Date:  22 Date:  22 Date:  22 Date  22 Date  22 Date  22 Date:  22   Activity/Exercise Parameters Parameters Parameters        Education HEP, POC, PT goals, anatomy/pathology, diagnosis,             Lower trunk rotation 3 min            glute bridges 3x10            Standing L stretch   2 min          Sidestepping and monster walks 2x40 feet, red band 2x40 feet, red band 2x40 feet, pink band 3x15 feet, pink band 2x40 feet, pink band, holding 8 lbs 2x40 feet, pink band, holding 8 lbs 2x40 feet, pink band, holding 10 lbs 2x40 feet, pink band, holding 10 lbs   Chair taps 3x5 3x10 7w07m18 lbs 10x       sci fit   8 min, level 4 8 min, random level 3, 40 kcal 8 min, random level 3, 47 kcal 8 min, random level 3, 42 kcal 8 min, random level 3, 42 kcal 8 min, random level 3, 42 kcal 8 min, random hill function, lvl 3   Sled push/pull   3x40 feet, 50 lbs 4x40 feet, 50 lbs        Step ups              Shuttle   3x10, 2 cords DL          Pallof press   3x10x7 lbs     3x10x7 lb     Rows   1b53j36 lbs 8j98u05 lbs DB rows  8 lb, 38 in surface  3 x 10 reps 1w95q86 lbs 4g32o54 lbs (bent over) against wall, 6h66i36 lbs (bent over) against wall, 5j47x65 lbs   Open books   x15/side          Lat pulldowns     x30x27 lbs 30 lb  3 x 10  reps 30 lb  3 x 10  reps 1t42z23 lbs  5o96f65 lbs   Lateral cable pulls     X5/side, 17 lbs   X5/side, 17 lbs              THERAPEUTIC ACTIVITY: ( 15 minutes): Activities per gid below to improve functional movement related mobility, strength and balance to improve neuro-muscular carryover to daily functional activities for improving patient's quality of life. Required visual, verbal and manual cues to promote proper body alignment and promote proper body posture/mechanics. Progressed resistance and complexity of movement as indicated.        Date:   5/23/22 Date:  5/25/22  Date:   6/2/22 Date  6/6/22 Date:  6/9/22   Activity/Exercise Parameters Parameters Parameters     Sit to stand 18 in surface,5lb weight  3 x 5 reps  · 3x10x5 lbs, yellow band around knees for cueing 2x10x5 lbs 0n12t04 lbs 18 in, 10 lb  3 x 10 reps   Rack pull 10 lb  3 x 5 reps  Mod to max VC for technique, instruction, and rationale X30, training bar only 3c74g30 lbs     Deadlift ·   ·   ·   4v98a81 lbs 4v52k15 lbs   ·   ·   ·   ·   ·  ·    ·   ·   ·   ·   ·  ·    ·   ·   ·   ·   ·  ·    ·   · ·   ·   ·  ·             MANUAL THERAPY: (0 minutes): Joint mobilization, Soft tissue mobilization was utilized and necessary because of the patient's restricted joint motion and restricted motion of soft tissue mobility. Date  5/18/2022     Technique Used Grade  Level # Time(s) Effect while being performed                                                                                                      HEP Log Date   1. See above 5/12/22   2.      3.     4.     5.             Treatment/Session Summary:    · Treatment Assessment:   pt will continue to require instruction for technique with more complex lifts. pt does respond well to verbal cues to lift chest during deadlift. pt to benefit from cotninued strengthening  of spine and LEs in order to reduce pain and participate in her wellness program with less pain. · Communication/Consultation:  None today  · Equipment provided today:  None  · Recommendations/Intent for next treatment session: Next visit will focus on Core Stability Hip strengthening. .    Treatment Plan of Care Effective Dates: 5/18/2022 TO 7/11/2022 (60 days).   Frequency/Duration: 2 times a week for 60 Days       Total Treatment Billable Duration:  40 minutes   Time In: 0900  Time Out: 0945    Anand Giraldo PT       Post Session Pain  Charge Capture  [employer] Portal  MD Guidelines  Scanned Media  Benefits  MyChart

## 2022-06-10 LAB
BASOPHILS # BLD: 0 K/UL (ref 0–0.2)
BASOPHILS NFR BLD: 1 % (ref 0–2)
DIFFERENTIAL METHOD BLD: ABNORMAL
EOSINOPHIL # BLD: 0.3 K/UL (ref 0–0.8)
EOSINOPHIL NFR BLD: 4 % (ref 0.5–7.8)
ERYTHROCYTE [DISTWIDTH] IN BLOOD BY AUTOMATED COUNT: 15.2 % (ref 11.9–14.6)
HCT VFR BLD AUTO: 44.2 % (ref 35.8–46.3)
HGB BLD-MCNC: 14.2 G/DL (ref 11.7–15.4)
IMM GRANULOCYTES # BLD AUTO: 0 K/UL (ref 0–0.5)
IMM GRANULOCYTES NFR BLD AUTO: 0 % (ref 0–5)
LYMPHOCYTES # BLD: 1.7 K/UL (ref 0.5–4.6)
LYMPHOCYTES NFR BLD: 20 % (ref 13–44)
MCH RBC QN AUTO: 29.3 PG (ref 26.1–32.9)
MCHC RBC AUTO-ENTMCNC: 32.1 G/DL (ref 31.4–35)
MCV RBC AUTO: 91.1 FL (ref 79.6–97.8)
MONOCYTES # BLD: 0.9 K/UL (ref 0.1–1.3)
MONOCYTES NFR BLD: 10 % (ref 4–12)
NEUTS SEG # BLD: 5.6 K/UL (ref 1.7–8.2)
NEUTS SEG NFR BLD: 65 % (ref 43–78)
NRBC # BLD: 0 K/UL (ref 0–0.2)
PLATELET # BLD AUTO: 296 K/UL (ref 150–450)
PMV BLD AUTO: 10.8 FL (ref 9.4–12.3)
RBC # BLD AUTO: 4.85 M/UL (ref 4.05–5.2)
TSH, 3RD GENERATION: 1.36 UIU/ML (ref 0.36–3.74)
WBC # BLD AUTO: 8.6 K/UL (ref 4.3–11.1)

## 2022-06-13 ENCOUNTER — HOSPITAL ENCOUNTER (OUTPATIENT)
Dept: PHYSICAL THERAPY | Age: 82
Setting detail: RECURRING SERIES
End: 2022-06-13
Payer: MEDICARE

## 2022-06-16 ENCOUNTER — HOSPITAL ENCOUNTER (OUTPATIENT)
Dept: PHYSICAL THERAPY | Age: 82
Setting detail: RECURRING SERIES
Discharge: HOME | End: 2022-06-19
Payer: MEDICARE

## 2022-06-16 PROCEDURE — 97110 THERAPEUTIC EXERCISES: CPT

## 2022-06-16 NOTE — THERAPY DISCHARGE
Steffanie Ling  : 1940  Primary: Medicare Part A And B  Secondary: [address] @ [address] [facility] [address]  Phone: [phone]  Fax: [phone] No data recorded  No data recorded    PT Visit Info:    No data recorded    OUTPATIENT PHYSICAL THERAPY:OP NOTE TYPE: Discharge Summary 2022               Episode  Appt Desk         Treatment Diagnosis:  Low back pain (M54.5)  Muscle Weakness, Generalized (M62.81)  * No diagnoses found *  Medical/Referring Diagnosis:  Low back pain, unspecified [M54.50]  Referring Physician:  Elena Ortega MD MD Orders:  PT Eval and Treat  Return MD Appt: tbd  Date of Onset:  No data recorded   Allergies:  Codeine  Restrictions/Precautions:    No data recordedNo data recorded   Medications Last Reviewed:  2022     SUBJECTIVE   History of Injury/Illness (Reason for Referral):  Discharge Summary: Able to play tennis without pain now. No complaints at this time. Feels ready to discharge today. Patient Stated Goal(s):  See initial evaluation  Initial:      /10 Post Session:      /10  Past Medical History/Comorbidities:   Ms. Rdz Courser  has a past medical history of Acne rosacea, CKD (chronic kidney disease) stage 3, GFR 30-59 ml/min ([address] Coronary atherosclerosis of native coronary artery, Diabetes mellitus, type 2 ([address] History of non-ST elevation myocardial infarction (NSTEMI), History of unstable angina, Hyperlipidemia, Hypertension, Hypothyroid, Osteoarthritis, PAF (paroxysmal atrial fibrillation) ([address] and S/P CABG (coronary artery bypass graft). Ms. Rdz Courser  has a past surgical history that includes Coronary artery bypass graft (13); heent; orthopedic surgery; and lumbar fusion.   Social History/Living Environment:   No data recorded   Prior Level of Function/Work/Activity:   No data recordedNo data recordedNo data recorded   Learning:   No data recorded   Fall Risk Scale: No data recorded         OBJECTIVE   Observation/Orthostatic Postural Assessment:           Forward Head, Rounded Shoulders and Thoracic Kyphosis  Palpation:          Increased Tenderness and tension to B lumbar paraspinals     ROM:                    AROM/PROM         Joint: Initial Assessment: 5/12/2022   Active ROM (deg) RIGHT LEFT   Knee Extension WNL WNL   Knee Flexion WNL WNL   Hip Flexion (PROM) ~100 deg ~100 deg   Hip IR (PROM) ~10 deg ~10 deg   Hip ER (PROM) ~35 deg ~35 deg      Lumbar ROM      Movement Range Descriptor Degrees Notes   Flexion           Extension           Right Sidebending           Left Sidebending           R Rotation ~75%, pain-free         Left Rotation ~75%, pain-free               Repeated Motion: not performed due to no neurogenic symptoms  Direction    Frequency Symptoms Prior Symptons Post   Flexion         Extension                  Strength:    Initial Assessment:5/12/2022          RIGHT LEFT Right (discharge) Left (discharge)   Knee Flexion (L5-S2)  4/5 4 /5, with posterior thigh pain 4+/5 4+/5   Knee Extension (L3, L4) 5 /5  5/5     Hip Flexion (L1, L2) 3+ /5 4- /5 4-/5 4-/5   Hip Extension 3+ /5 3+ /5     Hip Abduction (L5, S1) 3+ /5  3+/5 4-/5 4-/5   Ankle Dorsiflexion (L4) 5 /5 5 /5     Great Toe Extension (L5)  /5  /5     Ankle Plantar Flexion (S1-S2)  /5  /5           Special Tests: initial evaluation  Lumbar:  SLR: Negative with increased muscular tightness bilaterally  Positive Ely's bilaterally (knees ~100 deg in prone)   SI Joint:  Not Tested   Hip:  Not Tested            Manual: no joint assessment performed due to hx of fusion  Initial Assessment  5/12/2022       Joint/Area Directon Grade Treatment Effect                                       Reflex Testing:  Not performed  Location Left Right   Patellar (L4)       Achilles (S1)             Neurological Screen:               RADIATING SYMPTOMS: No  Upper motor Neuron screen         Clonus: Not Indicated Babinski: Not Indicated     Functional Mobility:  Affecting participation in basic ADLs and functional tasks.     Balance and Mobility:     Test Result   Timed up and Go     30 second Sit to Stand 15 reps   6 Minute Walk Test     Single Leg Balance                 ASSESSMENT   Discharge Summary:  Pt has made significant improvements in pain-free performance of ADLs, functional strength, subjective improvement in functional abilities, and improved ability to participate in usual recreational activities without pain. Pt verbalizes and demonstrates understanding of HEP and should be appropriate for discharge at this time. Problem List: (Impacting functional limitations):    No data recorded  see initial evaluation  Therapy Prognosis:    No data recorded  see initial evaluation  Assessment Complexity: see initial evaluation     PLAN   Effective Dates: discharge  Frequency/Duration: No data recorded   Interventions Planned (Treatment may consist of any combination of the following):    No data recorded   GOALS: (Goals have been discussed and agreed upon with patient.)       Short-Term Goals~4 weeks  Goal Met   1. Quentin Nolasco will be independent with HEP 1.  [x]? Date: 6/16/22   2. Quentin Nolasco will participate in LE stretching program to increase flexibility    2. [x]? Date: 6/16/22   3. Quentin Nolasco will participate in core stabilization exercises to help with stabilization during ADLs 3.  [x]? Date: 6/16/22   4. Quentin Nolasco will participate in LE strengthening program with weights/resistance as appropriate to help with gait and elevations 4. [x]? Date: 6/16/22   5.   5.  []? Date:   6.   6.  []? Date:                     Long Term Goals~8 weeks Goal Met   1. Quentin Nolasco will demonstrate a 8 point improvement on the Oswestry to show improvement in function 1. [x]? Date: 6/16/22   2. Quentin Nolasco will report 0/10 pain at rest and during ADLs  2.  [x]? Date: 6/16/22   3. Pt will perform at least 15 STS in 30 seconds to demonstrate improved functional strength. 3.  [x]? Date: 6/16/22   4. Pt will report playing tennis for ~1 hour with no more than 1/10 pain in low back. 4.  [x]? Date: 6/16/22                                 Outcome Measure: Tool Used: Modified Oswestry Low Back Pain Questionnaire  Score:  Initial: 12/50  Most Recent: 4/50 (Date: 6/16/22)   Interpretation of Score: Each section is scored on a 0-5 scale, 5 representing the greatest disability. The scores of each section are added together for a total score of 50.       Medical Necessity:   · Skilled intervention continues to be required due to above deficits affecting participation in basic ADLs and overall functional tolerance. Reason for Services/Other Comments:  · Patient continues to require skilled intervention due to  above deficits affecting participation in basic ADLs and overall functional tolerance.    Total Duration:  Time In: 1505  Time Out: 2984    Regarding Timoteo Houser's therapy, I certify that the treatment plan above will be carried out by a therapist or under their direction.   Thank you for this referral,  Mamadou Cedeno, PT     Referring Physician Signature: Brady Bernal MD        Post Session Pain  Charge Capture  PT Visit Info  POC Link  Treatment Note Link  MD Guidelines  MyChart

## 2022-06-16 NOTE — PROGRESS NOTES
Carly Estevesanthony  : 1940  Primary: Medicare Part A And B  Secondary: [address] @ [address] [facility] [address]  Phone: [phone]  Fax: [phone] No data recorded  No data recorded    PT Visit Info:    No data recorded    OUTPATIENT PHYSICAL THERAPY:OP NOTE TYPE: Treatment Note 2022     Appt Desk   Episode      Treatment Diagnosis: Low back pain (M54.5)  Muscle Weakness, Generalized (M62.81)    Medical/Referring Diagnosis:  Low back pain, unspecified [M54.50]  Referring Physician:  Dino Kinsey MD MD Orders:  PT Eval and Treat   Date of Onset:  No data recorded   Allergies:  Codeine  Restrictions/Precautions:    No data recordedNo data recorded   Interventions Planned (Treatment may consist of any combination of the following):    No data recorded   Subjective Comments: See Discharge Summary dated 20 for details     Initial:  does not rate    /10 Post Session:  does not rate    /10  Medications Last Reviewed:  2022  Updated Objective Findings:  See Discharge Summary dated 20 for details  Treatment   THERAPEUTIC EXERCISE: ( 40 minutes):  Exercises per grid below to improve mobility, strength and balance. Required minimal visual, verbal and manual cues to promote proper body alignment and promote proper body posture. Progressed resistance and complexity of movement as indicated. Date:  22 Date:  22 Date:  22 Date  22 Date  22 Date  22 Date:  22 Date  22   Activity/Exercise Parameters Parameters         Education          Updated measurements, updated HEP ith review and demonstration.     Lower trunk rotation             glute bridges             Standing L stretch 2 min           Sidestepping and monster walks 2x40 feet, red band 2x40 feet, pink band 3x15 feet, pink band 2x40 feet, pink band, holding 8 lbs 2x40 feet, pink band, holding 8 lbs 2x40 feet, pink band, holding 10 lbs 2x40 feet, pink band, holding 10 lbs    Chair taps 3x10 [id number] lbs 10x        sci fit 8 min, level 4 8 min, random level 3, 40 kcal 8 min, random level 3, 47 kcal 8 min, random level 3, 42 kcal 8 min, random level 3, 42 kcal 8 min, random level 3, 42 kcal 8 min, random hill function, lvl 3 8 min, random hill function, lvl 3   Sled push/pull 3x40 feet, 50 lbs 4x40 feet, 50 lbs         Step ups             Shuttle 3x10, 2 cords DL           Pallof press 3x10x7 lbs     3x10x7 lb      Rows 1m47v52 lbs [id number] lbs DB rows  8 lb, 38 in surface  3 x 10 reps 1o55v81 lbs 8g28p01 lbs (bent over) against wall, 6w47i67 lbs (bent over) against wall, 1v90k76 lbs    Open books x15/side           Lat pulldowns   x30x27 lbs 30 lb  3 x 10  reps 30 lb  3 x 10  reps 8f75n26 lbs  3u66d18 lbs    Lateral cable pulls   X5/side, 17 lbs   X5/side, 17 lbs               THERAPEUTIC ACTIVITY: ( 0 minutes): Activities per gid below to improve functional movement related mobility, strength and balance to improve neuro-muscular carryover to daily functional activities for improving patient's quality of life. Required visual, verbal and manual cues to promote proper body alignment and promote proper body posture/mechanics. Progressed resistance and complexity of movement as indicated.        Date:   5/23/22 Date:  5/25/22  Date:   6/2/22 Date  6/6/22 Date:  6/9/22   Activity/Exercise Parameters Parameters Parameters     Sit to stand 18 in surface,5lb weight  3 x 5 reps  · 3x10x5 lbs, yellow band around knees for cueing 2x10x5 lbs 7t32t71 lbs 18 in, 10 lb  3 x 10 reps   Rack pull 10 lb  3 x 5 reps  Mod to max VC for technique, instruction, and rationale X30, training bar only 4q85g83 lbs     Deadlift ·   ·   ·   8a48q06 lbs 7z47q34 lbs   ·   ·   ·   ·   ·  ·    ·   ·   ·   ·   ·  ·    ·   ·   ·   ·   ·  ·    ·   ·   ·   ·   ·  ·             MANUAL THERAPY: (0 minutes): Joint mobilization, Soft tissue mobilization was utilized and necessary because of the patient's restricted joint motion and restricted motion of soft tissue mobility. Date  5/18/2022     Technique Used Grade  Level # Time(s) Effect while being performed                                                                                                      HEP Log Date   1. See above 6/16/22   2.      3.     4.     5.             Treatment/Session Summary:    · Treatment Assessment: See Discharge Summary dated 6/16/20 for details     · Communication/Consultation:  None today  · Equipment provided today: hep handout  · Recommendations/Intent for next treatment session: See Discharge Summary dated 6/16/20 for details    Treatment Plan of Care Effective Dates: 5/18/2022 TO 7/11/2022 (60 days).   Frequency/Duration: 2 times a week for 60 Days       Total Treatment Billable Duration:  40 minutes   Time In: 3254  Time Out: Jonathan 45, PT       Post Session Pain  Charge Capture  Websense Portal  MD Guidelines  Scanned Media  Benefits  MyChart

## 2022-07-05 ENCOUNTER — OFFICE VISIT (OUTPATIENT)
Dept: CARDIOLOGY CLINIC | Age: 82
End: 2022-07-05
Payer: MEDICARE

## 2022-07-05 VITALS
DIASTOLIC BLOOD PRESSURE: 60 MMHG | WEIGHT: 158 LBS | BODY MASS INDEX: 24.8 KG/M2 | HEART RATE: 66 BPM | HEIGHT: 67 IN | SYSTOLIC BLOOD PRESSURE: 150 MMHG

## 2022-07-05 DIAGNOSIS — I25.10 CORONARY ARTERY DISEASE INVOLVING NATIVE CORONARY ARTERY OF NATIVE HEART WITHOUT ANGINA PECTORIS: ICD-10-CM

## 2022-07-05 DIAGNOSIS — E78.5 DYSLIPIDEMIA: ICD-10-CM

## 2022-07-05 DIAGNOSIS — I48.0 PAROXYSMAL ATRIAL FIBRILLATION (HCC): Primary | ICD-10-CM

## 2022-07-05 DIAGNOSIS — I10 ESSENTIAL HYPERTENSION: ICD-10-CM

## 2022-07-05 PROCEDURE — G8427 DOCREV CUR MEDS BY ELIG CLIN: HCPCS | Performed by: INTERNAL MEDICINE

## 2022-07-05 PROCEDURE — 1090F PRES/ABSN URINE INCON ASSESS: CPT | Performed by: INTERNAL MEDICINE

## 2022-07-05 PROCEDURE — G8400 PT W/DXA NO RESULTS DOC: HCPCS | Performed by: INTERNAL MEDICINE

## 2022-07-05 PROCEDURE — 1123F ACP DISCUSS/DSCN MKR DOCD: CPT | Performed by: INTERNAL MEDICINE

## 2022-07-05 PROCEDURE — 99214 OFFICE O/P EST MOD 30 MIN: CPT | Performed by: INTERNAL MEDICINE

## 2022-07-05 PROCEDURE — 1036F TOBACCO NON-USER: CPT | Performed by: INTERNAL MEDICINE

## 2022-07-05 PROCEDURE — G8417 CALC BMI ABV UP PARAM F/U: HCPCS | Performed by: INTERNAL MEDICINE

## 2022-07-05 ASSESSMENT — ENCOUNTER SYMPTOMS
BACK PAIN: 1
SHORTNESS OF BREATH: 0

## 2022-07-05 NOTE — PATIENT INSTRUCTIONS
Patient Education        Atrial Fibrillation: Care Instructions  Your Care Instructions     Atrial fibrillation is an irregular and often fast heartbeat. Treating this condition is important for several reasons. It can cause blood clots, which can travel from your heart to your brain and cause a stroke. If you have a fast heartbeat, you may feel lightheaded, dizzy, and weak. An irregular heartbeatcan also increase your risk for heart failure. Atrial fibrillation is often the result of another heart condition, such as high blood pressure or coronary artery disease. Making changes to improve yourheart condition will help you stay healthy and active. Follow-up care is a key part of your treatment and safety. Be sure to make and go to all appointments, and call your doctor if you are having problems. It's also a good idea to know your test results and keep alist of the medicines you take. How can you care for yourself at home? Medicines     Take your medicines exactly as prescribed. Call your doctor if you think you are having a problem with your medicine. You will get more details on the specific medicines your doctor prescribes.      If your doctor has given you a blood thinner to prevent a stroke, be sure you get instructions about how to take your medicine safely. Blood thinners can cause serious bleeding problems.      Do not take any vitamins, over-the-counter drugs, or herbal products without talking to your doctor first.   Lifestyle changes     Do not smoke. Smoking can increase your chance of a stroke and heart attack. If you need help quitting, talk to your doctor about stop-smoking programs and medicines. These can increase your chances of quitting for good.      Eat a heart-healthy diet.      Stay at a healthy weight. Lose weight if you need to.      Limit alcohol to 2 drinks a day for men and 1 drink a day for women. Too much alcohol can cause health problems.      Avoid colds and flu.  Get a  You have symptoms of a stroke. These may include:  ? Sudden numbness, tingling, weakness, or loss of movement in your face, arm, or leg, especially on only one side of your body. ? Sudden vision changes. ? Sudden trouble speaking. ? Sudden confusion or trouble understanding simple statements. ? Sudden problems with walking or balance. ? A sudden, severe headache that is different from past headaches.      You passed out (lost consciousness). Call your doctor now or seek immediate medical care if:     You have new or increased shortness of breath.      You feel dizzy or lightheaded, or you feel like you may faint.      Your heart rate becomes irregular.      You can feel your heart flutter in your chest or skip heartbeats. Tell your doctor if these symptoms are new or worse. Watch closely for changes in your health, and be sure to contact your doctor ifyou have any problems. Where can you learn more? Go to https://Square.Emotive. org and sign in to your I[employer] account. Enter U020 in the Noribachi box to learn more about \"Atrial Fibrillation: Care Instructions. \"     If you do not have an account, please click on the \"Sign Up Now\" link. Current as of: January 10, 2022               Content Version: 13.3  © 2006-2022 Healthwise, Incorporated. Care instructions adapted under license by [facility] ([facility]). If you have questions about a medical condition or this instruction, always ask your healthcare professional. [place] [address] any warranty or liability for your use of this information.

## 2022-07-05 NOTE — PROGRESS NOTES
[facility] CARDIOLOGY  [address] [facility], [address] [facility], [address] [place] [address]  PHONE: [phone]      22    NAME:  Mary Marshall  : 1940  MRN: [id number]         SUBJECTIVE:   Mary Marshall is a 79 y.o. female seen for a follow up visit regarding the following:     Chief Complaint   Patient presents with    Hypertension    Coronary Artery Disease            HPI:  Follow up  Hypertension and Coronary Artery Disease   . Follow up CAD and afib. She's felt badly d/t back pain, seeing her PCP, has been on NSAID, steroids and physical therapy. Describes it as an ache rather than a sharp pain, has trouble sleeping because of it. None of the interventions seem to have helped, noting her legs are weak. Still playing tennis despite the discomfort, her discomfort is actually improved when she plays tennis or takes a long walk. She tried a statin holiday for a month, didn't make any difference so she resumed it. Her symptoms did start around the holidays but also at the same time she started metformin so she has wondered if that could contribute. She has had prior back surgery. She has intermittent palpitations, we repeated her monitor, afib burden has increased to 9% and notably, when in afib her rates are fast.  Increased metoprolol to current dose, and since doing so has received no more alerts from her watch nor had significant symptoms  Palpitations but no dizziness, dyspnea, cp, near syncope. Is anticoagulated. Has EP appointment next week for recommendations regarding possible AAD therapy or ablation              Past cardiac history:   13 (Dr. Barrington Bansal) Coronary artery bypass grafting x4. Grafts consisting    of:    1. Left internal mammary artery to left anterior descending. 2. Reverse saphenous vein to the diagonal.    3. Reverse saphenous vein to the obtuse marginal.    4. Reverse saphenous vein graft to the posterior descending artery.    2020- 7 day monitor Diagnosis Date    Acne rosacea     CKD (chronic kidney disease) stage 3, GFR 30-59 ml/min ([facility]) 4/12/2013    Coronary atherosclerosis of native coronary artery 04/12/2013 4/16/13 (Dr. Kesha Lemus) Coronary artery bypass grafting x4. Grafts consisting  of:  1. Left internal mammary artery to left anterior descending. 2. Reverse saphenous vein to the diagonal.  3. Reverse saphenous vein to the obtuse marginal.  4. Reverse saphenous vein graft to the posterior descending artery.  Diabetes mellitus, type 2 ([facility]     History of non-ST elevation myocardial infarction (NSTEMI) 04/12/2013    History of unstable angina 09/23/2015    Hyperlipidemia 04/12/2013    Hypertension     Hypothyroid 4/12/2013    Osteoarthritis     PAF (paroxysmal atrial fibrillation) ([facility] 4/22/2013    S/P CABG (coronary artery bypass graft) 4/16/2013     Past Surgical History:   Procedure Laterality Date    CORONARY ARTERY BYPASS GRAFT  4/16/13    x 4 - Dr. Braden Gonzalez     Family History   Problem Relation Age of Onset    Heart Disease Mother     Heart Disease Father     Cancer Sister      Social History     Tobacco Use    Smoking status: Never Smoker    Smokeless tobacco: Never Used   Substance Use Topics    Alcohol use: Yes     Alcohol/week: 1.7 standard drinks       ROS:    Review of Systems   Cardiovascular: Positive for palpitations. Negative for chest pain. Respiratory: Negative for shortness of breath. Musculoskeletal: Positive for back pain.           PHYSICAL EXAM:   BP (!) 150/60   Pulse 66   Ht 5' 6.5\" (1.689 m)   Wt 158 lb (71.7 kg)   BMI 25.12 kg/m²      Wt Readings from Last 3 Encounters:   07/05/22 158 lb (71.7 kg)   02/21/22 158 lb 3.2 oz (71.8 kg)   12/29/21 161 lb 3.2 oz (73.1 kg)     BP Readings from Last 3 Encounters:   07/05/22 (!) 150/60   02/21/22 126/80   12/29/21 132/72     Pulse Readings from Last 3 Encounters:   07/05/22 66   02/21/22 67   12/29/21 72           Physical Exam  Constitutional:       Appearance: Normal appearance. HENT:      Head: Normocephalic and atraumatic. Eyes:      Conjunctiva/sclera: Conjunctivae normal.   Neck:      Vascular: No carotid bruit. Cardiovascular:      Rate and Rhythm: Normal rate and regular rhythm. Heart sounds: No murmur heard. No friction rub. No gallop. Pulmonary:      Effort: No respiratory distress. Breath sounds: No wheezing or rales. Musculoskeletal:         General: No swelling. Cervical back: Neck supple. Skin:     General: Skin is warm and dry. Neurological:      General: No focal deficit present. Mental Status: She is alert. Psychiatric:         Mood and Affect: Mood normal.         Behavior: Behavior normal.         Medical problems and test results were reviewed with the patient today.      DATA REVIEW    LIPID PANEL     Lab Results   Component Value Date    CHOL 157 05/27/2021     Lab Results   Component Value Date    TRIG 146 05/27/2021     Lab Results   Component Value Date    HDL 54 05/27/2021     Lab Results   Component Value Date    LDLCALC 78 05/27/2021     Lab Results   Component Value Date    VLDL 25 05/27/2021     No results found for: [facility]  Lab Results   Component Value Date    TSH 3.120 12/21/2021     BMP  Lab Results   Component Value Date/Time     04/01/2022 09:15 AM    K 5.1 04/01/2022 09:15 AM     04/01/2022 09:15 AM    CO2 18 04/01/2022 09:15 AM    BUN 20 04/01/2022 09:15 AM    CREATININE 0.96 04/01/2022 09:15 AM    GLUCOSE 88 04/01/2022 09:15 AM    CALCIUM 9.3 04/01/2022 09:15 AM      Lab Results   Component Value Date    LABA1C 6.6 (H) 04/01/2022     Lab Results   Component Value Date     04/01/2022       EKG        CXR/IMAGING        DEVICE INTERROGATION        OUTSIDE RECORDS REVIEW    Records from outside providers have been reviewed and summarized as noted in the HPI, past history and data review sections of this note, and reviewed with patient. .       ASSESSMENT and PLAN    Perri Bar was seen today for hypertension and coronary artery disease. Diagnoses and all orders for this visit:    Paroxysmal atrial fibrillation ([address]    Essential hypertension    Coronary artery disease involving native coronary artery of native heart without angina pectoris    Dyslipidemia          IMPRESSION:    Afib burden 9%, but RVR when in afib. Active octogenarian, continues to play doubles tennis despite her back issues ( in fact feels better with activity). Seeing Dr Seamus Tian to opine on possible AAD or ablation to keep her at her active level, vs watchful waiting on current therapy. Lipids at or near goal, continue meds    BP generally controlled. Slept poorly last night, in discomfort with her back, continue to monitor on current therapy        Return in about 6 months (around 1/5/2023). Thank you for allowing me to participate in this patient's care. Please call or contact me if there are any questions or concerns regarding the above.       Leandro Miner MD  07/05/22  3:09 PM

## 2022-07-12 ENCOUNTER — INITIAL CONSULT (OUTPATIENT)
Dept: CARDIOLOGY CLINIC | Age: 82
End: 2022-07-12
Payer: MEDICARE

## 2022-07-12 VITALS
SYSTOLIC BLOOD PRESSURE: 142 MMHG | HEIGHT: 67 IN | WEIGHT: 158 LBS | BODY MASS INDEX: 24.8 KG/M2 | HEART RATE: 65 BPM | DIASTOLIC BLOOD PRESSURE: 80 MMHG

## 2022-07-12 DIAGNOSIS — N18.31 STAGE 3A CHRONIC KIDNEY DISEASE (HCC): ICD-10-CM

## 2022-07-12 DIAGNOSIS — I10 PRIMARY HYPERTENSION: ICD-10-CM

## 2022-07-12 DIAGNOSIS — I25.10 CORONARY ARTERY DISEASE INVOLVING NATIVE CORONARY ARTERY OF NATIVE HEART WITHOUT ANGINA PECTORIS: ICD-10-CM

## 2022-07-12 DIAGNOSIS — I48.0 PAROXYSMAL ATRIAL FIBRILLATION (HCC): Primary | ICD-10-CM

## 2022-07-12 DIAGNOSIS — I25.118 ATHEROSCLEROSIS OF NATIVE CORONARY ARTERY OF NATIVE HEART WITH STABLE ANGINA PECTORIS (HCC): ICD-10-CM

## 2022-07-12 DIAGNOSIS — I87.2 VENOUS INSUFFICIENCY OF BOTH LOWER EXTREMITIES: ICD-10-CM

## 2022-07-12 PROCEDURE — G8428 CUR MEDS NOT DOCUMENT: HCPCS | Performed by: INTERNAL MEDICINE

## 2022-07-12 PROCEDURE — G8400 PT W/DXA NO RESULTS DOC: HCPCS | Performed by: INTERNAL MEDICINE

## 2022-07-12 PROCEDURE — 93000 ELECTROCARDIOGRAM COMPLETE: CPT | Performed by: INTERNAL MEDICINE

## 2022-07-12 PROCEDURE — G8417 CALC BMI ABV UP PARAM F/U: HCPCS | Performed by: INTERNAL MEDICINE

## 2022-07-12 PROCEDURE — 1123F ACP DISCUSS/DSCN MKR DOCD: CPT | Performed by: INTERNAL MEDICINE

## 2022-07-12 PROCEDURE — 99204 OFFICE O/P NEW MOD 45 MIN: CPT | Performed by: INTERNAL MEDICINE

## 2022-07-12 PROCEDURE — 1090F PRES/ABSN URINE INCON ASSESS: CPT | Performed by: INTERNAL MEDICINE

## 2022-07-12 PROCEDURE — 1036F TOBACCO NON-USER: CPT | Performed by: INTERNAL MEDICINE

## 2022-07-12 ASSESSMENT — ENCOUNTER SYMPTOMS
ALLERGIC/IMMUNOLOGIC NEGATIVE: 1
BACK PAIN: 1
EYES NEGATIVE: 1
GASTROINTESTINAL NEGATIVE: 1
RESPIRATORY NEGATIVE: 1

## 2022-07-12 NOTE — PROGRESS NOTES
[facility] CARDIOLOGY  [address] [place] [address], [address] [facility], [address] [place] [address]  PHONE: [phone]        22    NAME:  Diane Church  : 1940  MRN: [id number]     Referring Cardiologist: Isaias Walton MD    Reason for Consultation: Atrial fibrillation    ASSESSMENT and PLAN:  Radha Vance was seen today for irregular heart beat. Diagnoses and all orders for this visit:    Paroxysmal atrial fibrillation ([facility] [address]    Primary hypertension    Venous insufficiency of both lower extremities    Coronary artery disease involving native coronary artery of native heart without angina pectoris    Atherosclerosis of native coronary artery of native heart with stable angina pectoris (HCC)    Stage 3a chronic kidney disease ([facility] [address]    79year old female with pAF. We reviewed potential treatment options such as AAD therapy vs AF Ablation vs pace/ablate. She has a mild to moderate amount of AF on a recent monitor which has done better with increasing the dose of her BB. She is appropriately anticoagulated. She has maintained an active lifestyle and generally feels well. I will elect to make no further changes at this time. -AF - continue BB and Eliquis. -CAD - continue OMT. -Routine cardiac care per Dr. Pippa Gaspar. -EP follow up in 1 year or PRN. Patient has been instructed and agrees to call our office with any issues or other concerns related to their cardiac condition(s) and/or complaint(s). No follow-up provider specified. Thank you for allowing me to participate in the electrophysiologic care of Ms. Diane Church. Please contact me if any questions or concerns were to arise. Comfort Deluna MD, MS  Clinical Cardiac Electrophysiology  [facility] Cardiology  22  3:19 PM    ===================================================================  Chief Complant:    Chief Complaint   Patient presents with    Irregular Heart Beat      Consultation is requested by Thalia Bradshaw MD for evaluation of Irregular Heart Beat    History:  Karrie Rascon is a most pleasant 79 y.o. female with a past medical and cardiac history significant for CAD s/p CABG and more frequent episodes of pAF. She is referred by Dr. Evaristo Gaitan. She was noted to have 9% AF on a recent monitor. Per report, her symptoms appear to correlate to her AF events. She does have AF with RVR when she does get AF. Symptoms include palpitations. She is treated with Eliquis and a BB. She also has been dealing with recent back pain issues to which she has seen her PCP, received steroids and underwent PT. She will play tennis despite her pain. In EP consultation, she has pAF. She is doing better on an increased dose of metoprolol. She generally feels quite well and has maintained a high quality of life and active lifestyle. The patient otherwise denies chest pain, dyspnea, presyncope, syncope or lateralizing symptoms. Cardiac PMH: (Old records have been reviewed and summarized below)  4/16/13 (Dr. Darcy Angeles) Coronary artery bypass grafting x4. Grafts consisting    of:    1. Left internal mammary artery to left anterior descending. 2. Reverse saphenous vein to the diagonal.    3. Reverse saphenous vein to the obtuse marginal.    4. Reverse saphenous vein graft to the posterior descending artery. Jul 2020- 7 day monitor showed 1 brief episode of atrial fib/flutter with variable heart rate    EKG:  (EKG has been independently visualized by me with interpretation below): NSR, normal axis, no ischemia. Monitor: 6/2022  2 week monitor  9% burden of both atrial fibrillation and typical atrial flutter with variable conduction and RVR  Average rate while in afib/flutter is ~125.     No significant bradycardia or block  Rare aberrant conduction  Reported symptoms generally correlate with RVR    ECHO: n/a     Previous Heart Catheterization: n/a     Stress Test: n/a     DEVICE INTERROGATION: n/a     Past Medical History, Past Surgical History, Family history, Social History, and Medications were all reviewed with the patient today and updated as necessary. Current Outpatient Medications   Medication Sig Dispense Refill    metoprolol succinate (TOPROL XL) 100 MG extended release tablet Take 1 tablet by mouth daily 90 tablet 1    apixaban (ELIQUIS) 5 MG TABS tablet Take 5 mg by mouth 2 times daily      atorvastatin (LIPITOR) 40 MG tablet Take 40 mg by mouth daily      cyclobenzaprine (FLEXERIL) 5 MG tablet Take 5 mg by mouth daily as needed      furosemide (LASIX) 20 MG tablet Take 20 mg by mouth daily as needed      levothyroxine (SYNTHROID) 50 MCG tablet TAKE 1 TABLET BY MOUTH EVERY DAY BEFORE BREAKFAST      losartan (COZAAR) 100 MG tablet Take 100 mg by mouth daily      meloxicam (MOBIC) 7.5 MG tablet Take 7.5 mg by mouth daily as needed       metFORMIN (GLUCOPHAGE-XR) 500 MG extended release tablet TAKE 1 TABLET BY MOUTH EVERY DAY WITH DINNER      nitroGLYCERIN (NITROSTAT) 0.4 MG SL tablet Place 0.4 mg under the tongue       No current facility-administered medications for this visit. Allergies   Allergen Reactions    Codeine Nausea And Vomiting     Other reaction(s): Nausea and/or vomiting-Intolerance       Past Medical History:   Diagnosis Date    Acne rosacea     CKD (chronic kidney disease) stage 3, GFR 30-59 ml/min ([facility] 4/12/2013    Coronary atherosclerosis of native coronary artery 04/12/2013 4/16/13 (Dr. Kesha Lemus) Coronary artery bypass grafting x4. Grafts consisting  of:  1. Left internal mammary artery to left anterior descending. 2. Reverse saphenous vein to the diagonal.  3. Reverse saphenous vein to the obtuse marginal.  4. Reverse saphenous vein graft to the posterior descending artery.       Diabetes mellitus, type 2 ([facility] [address]     History of non-ST elevation myocardial infarction (NSTEMI) 04/12/2013    History of unstable angina 09/23/2015    Hyperlipidemia 04/12/2013    Hypertension     Hypothyroid 4/12/2013    Osteoarthritis     PAF (paroxysmal atrial fibrillation) ([facility] [address] 4/22/2013    S/P CABG (coronary artery bypass graft) 4/16/2013     Past Surgical History:   Procedure Laterality Date    CORONARY ARTERY BYPASS GRAFT  4/16/13    x 4 - Dr. Braden Gonzalez     Family History   Problem Relation Age of Onset    Heart Disease Mother     Heart Disease Father     Cancer Sister      Social History     Tobacco Use    Smoking status: Never Smoker    Smokeless tobacco: Never Used   Substance Use Topics    Alcohol use: Yes     Alcohol/week: 1.7 standard drinks       ROS:  A comprehensive review of systems was performed with the pertinent positives and negatives as noted in the HPI in addition to:  Review of Systems   Constitutional: Negative. HENT: Negative. Eyes: Negative. Respiratory: Negative. Cardiovascular: Positive for palpitations. Gastrointestinal: Negative. Endocrine: Negative. Genitourinary: Negative. Musculoskeletal: Positive for back pain. Skin: Negative. Allergic/Immunologic: Negative. Neurological: Negative. Hematological: Negative. Psychiatric/Behavioral: Negative. All other systems reviewed and are negative. PHYSICAL EXAM:   BP (!) 142/80   Pulse 65   Ht 5' 6.5\" (1.689 m)   Wt 158 lb (71.7 kg)   BMI 25.12 kg/m²      Wt Readings from Last 3 Encounters:   07/12/22 158 lb (71.7 kg)   07/05/22 158 lb (71.7 kg)   02/21/22 158 lb 3.2 oz (71.8 kg)     BP Readings from Last 3 Encounters:   07/12/22 (!) 142/80   07/05/22 (!) 150/60   02/21/22 126/80       Gen: Well appearing, well developed, no acute distress  Eyes: Pupils equal, round.  Extraocular movements are intact  ENT: Oropharynx clear, no oral lesions, normal dentition  CV: S1S2, regular rate and rhythm, no murmurs, rubs or gallops, normal JVD, no carotid bruits, normal distal pulses, no ALEX  Pulm: Clear to auscultation bilaterally, no accessory muscle uses, no wheezes or rales  GI: Soft, NT, ND, +BS  Neuro: Alert and oriented, nonfocal  Psych: Appropriate affect  Skin: Normal color and skin turgor  MSK: Normal muscle bulk and tone    Medical problems and test results were reviewed with the patient today. No results found for any visits on 07/12/22.

## 2022-07-14 ENCOUNTER — PATIENT MESSAGE (OUTPATIENT)
Dept: CARDIOLOGY CLINIC | Age: 82
End: 2022-07-14

## 2022-07-14 NOTE — TELEPHONE ENCOUNTER
From: Adrianne Shaver  To: Dr. Colt Chen: 7/14/2022 10:09 AM EDT  Subject: AFib    Dr. Luan Almonte,    Many thanks for referring me to Dr. May Ferrell. What an amazing man/professional doctor and I certainly learned a lot on that visit. A tennis friend needs to check in with a cardiologist. I gave her your name and number. I hope she contacts the office for an appointment. Stay cool.     Jose Petersen

## 2022-07-21 ENCOUNTER — RX ONLY (OUTPATIENT)
Age: 82
Setting detail: RX ONLY
End: 2022-07-21

## 2022-08-04 ENCOUNTER — OFFICE VISIT (OUTPATIENT)
Dept: INTERNAL MEDICINE CLINIC | Facility: CLINIC | Age: 82
End: 2022-08-04
Payer: MEDICARE

## 2022-08-04 VITALS
DIASTOLIC BLOOD PRESSURE: 76 MMHG | WEIGHT: 156.2 LBS | OXYGEN SATURATION: 72 % | BODY MASS INDEX: 24.52 KG/M2 | TEMPERATURE: 98.9 F | HEART RATE: 97 BPM | HEIGHT: 67 IN | SYSTOLIC BLOOD PRESSURE: 137 MMHG

## 2022-08-04 DIAGNOSIS — G89.29 CHRONIC BILATERAL LOW BACK PAIN WITHOUT SCIATICA: Primary | ICD-10-CM

## 2022-08-04 DIAGNOSIS — M48.062 NEUROGENIC CLAUDICATION DUE TO LUMBAR SPINAL STENOSIS: ICD-10-CM

## 2022-08-04 DIAGNOSIS — M54.50 CHRONIC BILATERAL LOW BACK PAIN WITHOUT SCIATICA: Primary | ICD-10-CM

## 2022-08-04 DIAGNOSIS — Z98.1 HISTORY OF LUMBAR FUSION: ICD-10-CM

## 2022-08-04 PROCEDURE — 1123F ACP DISCUSS/DSCN MKR DOCD: CPT | Performed by: INTERNAL MEDICINE

## 2022-08-04 PROCEDURE — G8420 CALC BMI NORM PARAMETERS: HCPCS | Performed by: INTERNAL MEDICINE

## 2022-08-04 PROCEDURE — 1036F TOBACCO NON-USER: CPT | Performed by: INTERNAL MEDICINE

## 2022-08-04 PROCEDURE — G8400 PT W/DXA NO RESULTS DOC: HCPCS | Performed by: INTERNAL MEDICINE

## 2022-08-04 PROCEDURE — G8428 CUR MEDS NOT DOCUMENT: HCPCS | Performed by: INTERNAL MEDICINE

## 2022-08-04 PROCEDURE — 99213 OFFICE O/P EST LOW 20 MIN: CPT | Performed by: INTERNAL MEDICINE

## 2022-08-04 PROCEDURE — 1090F PRES/ABSN URINE INCON ASSESS: CPT | Performed by: INTERNAL MEDICINE

## 2022-08-04 ASSESSMENT — ENCOUNTER SYMPTOMS
VOICE CHANGE: 0
STRIDOR: 0
EYE PAIN: 0
RECTAL PAIN: 0

## 2022-08-04 NOTE — PROGRESS NOTES
Family History   Problem Relation Age of Onset    Heart Disease Mother     Heart Disease Father     Cancer Sister        Social History     Socioeconomic History    Marital status:      Spouse name: Not on file    Number of children: Not on file    Years of education: Not on file    Highest education level: Not on file   Occupational History    Not on file   Tobacco Use    Smoking status: Never    Smokeless tobacco: Never   Substance and Sexual Activity    Alcohol use: Yes     Alcohol/week: 1.7 standard drinks    Drug use: Never    Sexual activity: Not on file   Other Topics Concern    Not on file   Social History Narrative    Not on file     Social Determinants of Health     Financial Resource Strain: Not on file   Food Insecurity: Not on file   Transportation Needs: Not on file   Physical Activity: Not on file   Stress: Not on file   Social Connections: Not on file   Intimate Partner Violence: Not on file   Housing Stability: Not on file       Current Outpatient Medications   Medication Sig Dispense Refill    apixaban (ELIQUIS) 5 MG TABS tablet Take 1 tablet by mouth in the morning and 1 tablet before bedtime.  180 tablet 3    metoprolol succinate (TOPROL XL) 100 MG extended release tablet Take 1 tablet by mouth daily 90 tablet 1    atorvastatin (LIPITOR) 40 MG tablet Take 40 mg by mouth daily      cyclobenzaprine (FLEXERIL) 5 MG tablet Take 5 mg by mouth daily as needed      furosemide (LASIX) 20 MG tablet Take 20 mg by mouth daily as needed      levothyroxine (SYNTHROID) 50 MCG tablet TAKE 1 TABLET BY MOUTH EVERY DAY BEFORE BREAKFAST      losartan (COZAAR) 100 MG tablet Take 100 mg by mouth daily      metFORMIN (GLUCOPHAGE-XR) 500 MG extended release tablet TAKE 1 TABLET BY MOUTH EVERY DAY WITH DINNER      nitroGLYCERIN (NITROSTAT) 0.4 MG SL tablet Place 0.4 mg under the tongue      meloxicam (MOBIC) 7.5 MG tablet Take 7.5 mg by mouth daily as needed  (Patient not taking: Reported on 8/4/2022) No current facility-administered medications for this visit. Allergies as of 08/04/2022 - Fully Reviewed 07/12/2022   Allergen Reaction Noted    Codeine Nausea And Vomiting 10/15/2009       Review of Systems   Constitutional:  Negative for activity change and appetite change. HENT:  Negative for drooling and voice change. Eyes:  Negative for pain. Respiratory:  Negative for stridor. Gastrointestinal:  Negative for rectal pain. Endocrine: Negative for polydipsia and polyphagia. Genitourinary:  Negative for dyspareunia and enuresis. Musculoskeletal:  Negative for gait problem and neck stiffness. Skin:  Negative for pallor. Neurological:  Negative for facial asymmetry and speech difficulty. Hematological:  Does not bruise/bleed easily. Psychiatric/Behavioral:  Negative for self-injury. The patient is not hyperactive. Patient Care Team:  Yovani Rodrigez MD as PCP - General  Yovani Rodrigez MD as PCP - [facility] Provider    Objective:    /76 (Site: Left Upper Arm, Position: Sitting)   Pulse 97   Temp 98.9 °F (37.2 °C) (Temporal)   Ht 5' 6.5\" (1.689 m)   Wt 156 lb 3.2 oz (70.9 kg)   SpO2 (!) 72%   BMI 24.83 kg/m²     Physical Exam  Vitals reviewed. Constitutional:       General: She is not in acute distress. Appearance: She is not toxic-appearing. HENT:      Head: Normocephalic and atraumatic. Right Ear: Tympanic membrane, ear canal and external ear normal.      Left Ear: Tympanic membrane, ear canal and external ear normal.      Nose: Nose normal.      Mouth/Throat:      Mouth: Mucous membranes are moist.      Pharynx: Oropharynx is clear. Eyes:      General: No scleral icterus. Extraocular Movements: Extraocular movements intact. Pupils: Pupils are equal, round, and reactive to light. Cardiovascular:      Rate and Rhythm: Normal rate and regular rhythm. Pulses: Normal pulses. Heart sounds: Normal heart sounds.    Pulmonary: Effort: Pulmonary effort is normal. No respiratory distress. Breath sounds: Normal breath sounds. No stridor. Abdominal:      General: Abdomen is flat. Bowel sounds are normal.      Palpations: Abdomen is soft. There is no mass. Tenderness: There is no guarding or rebound. Musculoskeletal:         General: Normal range of motion. Cervical back: Normal range of motion and neck supple. Skin:     General: Skin is warm and dry. Coloration: Skin is not jaundiced. Neurological:      Mental Status: She is alert and oriented to person, place, and time. Mental status is at baseline. Psychiatric:         Behavior: Behavior normal.         Thought Content:  Thought content normal.            Orders Only on 06/09/2022   Component Date Value Ref Range Status    TSH, 3RD GENERATION 06/09/2022 1.360  0.358 - 3.740 uIU/mL Final    WBC 06/09/2022 8.6  4.3 - 11.1 K/uL Final    RBC 06/09/2022 4.85  4.05 - 5.2 M/uL Final    Hemoglobin 06/09/2022 14.2  11.7 - 15.4 g/dL Final    Hematocrit 06/09/2022 44.2  35.8 - 46.3 % Final    MCV 06/09/2022 91.1  79.6 - 97.8 FL Final    MCH 06/09/2022 29.3  26.1 - 32.9 PG Final    MCHC 06/09/2022 32.1  31.4 - 35.0 g/dL Final    RDW 06/09/2022 15.2 (A) 11.9 - 14.6 % Final    Platelets 06/87/6888 296  150 - 450 K/uL Final    MPV 06/09/2022 10.8  9.4 - 12.3 FL Final    nRBC 06/09/2022 0.00  0.0 - 0.2 K/uL Final    **Note: Absolute NRBC parameter is now reported with Hemogram**    Differential Type 06/09/2022 AUTOMATED    Final    Seg Neutrophils 06/09/2022 65  43 - 78 % Final    Lymphocytes 06/09/2022 20  13 - 44 % Final    Monocytes 06/09/2022 10  4.0 - 12.0 % Final    Eosinophils % 06/09/2022 4  0.5 - 7.8 % Final    Basophils 06/09/2022 1  0.0 - 2.0 % Final    Immature Granulocytes 06/09/2022 0  0.0 - 5.0 % Final    Segs Absolute 06/09/2022 5.6  1.7 - 8.2 K/UL Final    Absolute Lymph # 06/09/2022 1.7  0.5 - 4.6 K/UL Final    Absolute Mono # 06/09/2022 0.9  0.1 - 1.3 K/UL Final    Absolute Eos # 06/09/2022 0.3  0.0 - 0.8 K/UL Final    Basophils Absolute 06/09/2022 0.0  0.0 - 0.2 K/UL Final    Absolute Immature Granulocyte 06/09/2022 0.0  0.0 - 0.5 K/UL Final         Assessent & Plan:        1. Chronic bilateral low back pain without sciatica  Overview:  See 4/28/22 note. Patient describes what sounds like mild neurogenic   claudication. Suspect degenerative lumbar spinal stenosis. Continue low   dose meloxicam.  Trial Medrol dose pack x one. LS spine films to rule out   spondylolisthesis. Trial PT. If not better would pursue additional   imaging studies. Orders:  -     Freddy Jiang MD, Neurosurgery, Tg  2. Neurogenic claudication due to lumbar spinal stenosis  -     Freddy Jiang MD, Neurosurgery, Tg  3. History of lumbar fusion  -     [facility] - Gilles Shah MD, Neurosurgery, [facility]      The patient and/or patient representative voiced understanding and agreement with the current diagnoses, recommendations, and possible side effects. Return for appointment as previously scheduled.

## 2022-08-17 ENCOUNTER — OFFICE VISIT (OUTPATIENT)
Dept: NEUROSURGERY | Age: 82
End: 2022-08-17
Payer: MEDICARE

## 2022-08-17 VITALS
HEART RATE: 61 BPM | SYSTOLIC BLOOD PRESSURE: 147 MMHG | DIASTOLIC BLOOD PRESSURE: 66 MMHG | OXYGEN SATURATION: 96 % | HEIGHT: 67 IN | WEIGHT: 158 LBS | TEMPERATURE: 97.3 F | BODY MASS INDEX: 24.8 KG/M2

## 2022-08-17 DIAGNOSIS — G89.29 CHRONIC LOW BACK PAIN, UNSPECIFIED BACK PAIN LATERALITY, UNSPECIFIED WHETHER SCIATICA PRESENT: Primary | ICD-10-CM

## 2022-08-17 DIAGNOSIS — M54.50 CHRONIC LOW BACK PAIN, UNSPECIFIED BACK PAIN LATERALITY, UNSPECIFIED WHETHER SCIATICA PRESENT: Primary | ICD-10-CM

## 2022-08-17 PROCEDURE — 99203 OFFICE O/P NEW LOW 30 MIN: CPT | Performed by: NURSE PRACTITIONER

## 2022-08-17 PROCEDURE — G8417 CALC BMI ABV UP PARAM F/U: HCPCS | Performed by: NURSE PRACTITIONER

## 2022-08-17 PROCEDURE — G8427 DOCREV CUR MEDS BY ELIG CLIN: HCPCS | Performed by: NURSE PRACTITIONER

## 2022-08-17 PROCEDURE — G8400 PT W/DXA NO RESULTS DOC: HCPCS | Performed by: NURSE PRACTITIONER

## 2022-08-17 PROCEDURE — 1090F PRES/ABSN URINE INCON ASSESS: CPT | Performed by: NURSE PRACTITIONER

## 2022-08-17 PROCEDURE — 1123F ACP DISCUSS/DSCN MKR DOCD: CPT | Performed by: NURSE PRACTITIONER

## 2022-08-17 PROCEDURE — 1036F TOBACCO NON-USER: CPT | Performed by: NURSE PRACTITIONER

## 2022-08-17 RX ORDER — MELOXICAM 7.5 MG/1
7.5 TABLET ORAL DAILY PRN
Qty: 30 TABLET | Refills: 1 | Status: SHIPPED | OUTPATIENT
Start: 2022-08-17 | End: 2022-10-13

## 2022-08-17 ASSESSMENT — PATIENT HEALTH QUESTIONNAIRE - PHQ9
SUM OF ALL RESPONSES TO PHQ QUESTIONS 1-9: 0
SUM OF ALL RESPONSES TO PHQ9 QUESTIONS 1 & 2: 0
SUM OF ALL RESPONSES TO PHQ QUESTIONS 1-9: 0
SUM OF ALL RESPONSES TO PHQ QUESTIONS 1-9: 0
SUM OF ALL RESPONSES TO PHQ9 QUESTIONS 1 & 2: 0
SUM OF ALL RESPONSES TO PHQ QUESTIONS 1-9: 0
1. LITTLE INTEREST OR PLEASURE IN DOING THINGS: 0
2. FEELING DOWN, DEPRESSED OR HOPELESS: 0
2. FEELING DOWN, DEPRESSED OR HOPELESS: 0
1. LITTLE INTEREST OR PLEASURE IN DOING THINGS: 0
SUM OF ALL RESPONSES TO PHQ QUESTIONS 1-9: 0

## 2022-08-31 ENCOUNTER — PATIENT MESSAGE (OUTPATIENT)
Dept: CARDIOLOGY CLINIC | Age: 82
End: 2022-08-31

## 2022-09-01 NOTE — TELEPHONE ENCOUNTER
From: Shree Perez  To: Dr. Ubaldo Aguilar: 8/31/2022 6:00 PM EDT  Subject: Eliquis    Dr. Nette Montelongo,    Sorry to bother you but. .. Richard Moffett I need your help regarding a prescription START DATE for my supplemental insurance. I have been all over My Chart and have not been able to find this. Can you please tell me when you started me on Eliquis. I believe it was when I still lived in [place] - before . I have checked with [facility] locally and their records only go back 2 years. I also plan on calling the [facility] in [place]. I appreciate this information tremendously.     Savana Alvarez

## 2022-09-21 NOTE — TELEPHONE ENCOUNTER
Requested Prescriptions     Pending Prescriptions Disp Refills    levothyroxine (SYNTHROID) 50 MCG tablet 90 tablet 1     Sig: Take 1 tablet by mouth Daily TAKE 1 TABLET BY MOUTH EVERY DAY BEFORE BREAKFAST

## 2022-09-22 RX ORDER — LEVOTHYROXINE SODIUM 0.05 MG/1
50 TABLET ORAL DAILY
Qty: 90 TABLET | Refills: 1 | Status: SHIPPED | OUTPATIENT
Start: 2022-09-22

## 2022-10-07 DIAGNOSIS — E11.9 TYPE 2 DIABETES MELLITUS WITHOUT COMPLICATION, WITHOUT LONG-TERM CURRENT USE OF INSULIN (HCC): ICD-10-CM

## 2022-10-07 DIAGNOSIS — E03.9 ACQUIRED HYPOTHYROIDISM: ICD-10-CM

## 2022-10-07 DIAGNOSIS — E78.2 MIXED HYPERLIPIDEMIA: ICD-10-CM

## 2022-10-07 LAB
ALBUMIN SERPL-MCNC: 3.8 G/DL (ref 3.2–4.6)
ALBUMIN/GLOB SERPL: 1.4 {RATIO} (ref 1.2–3.5)
ALP SERPL-CCNC: 118 U/L (ref 50–136)
ALT SERPL-CCNC: 26 U/L (ref 12–65)
ANION GAP SERPL CALC-SCNC: 9 MMOL/L (ref 4–13)
AST SERPL-CCNC: 23 U/L (ref 15–37)
BILIRUB SERPL-MCNC: 0.6 MG/DL (ref 0.2–1.1)
BUN SERPL-MCNC: 21 MG/DL (ref 8–23)
CALCIUM SERPL-MCNC: 9.3 MG/DL (ref 8.3–10.4)
CHLORIDE SERPL-SCNC: 111 MMOL/L (ref 101–110)
CHOLEST SERPL-MCNC: 128 MG/DL
CO2 SERPL-SCNC: 22 MMOL/L (ref 21–32)
CREAT SERPL-MCNC: 1.1 MG/DL (ref 0.6–1)
EST. AVERAGE GLUCOSE BLD GHB EST-MCNC: 134 MG/DL
GLOBULIN SER CALC-MCNC: 2.7 G/DL (ref 2.3–3.5)
GLUCOSE SERPL-MCNC: 103 MG/DL (ref 65–100)
HBA1C MFR BLD: 6.3 % (ref 4.8–5.6)
HDLC SERPL-MCNC: 48 MG/DL (ref 40–60)
HDLC SERPL: 2.7 {RATIO}
LDLC SERPL CALC-MCNC: 61.2 MG/DL
POTASSIUM SERPL-SCNC: 4.1 MMOL/L (ref 3.5–5.1)
PROT SERPL-MCNC: 6.5 G/DL (ref 6.3–8.2)
SODIUM SERPL-SCNC: 142 MMOL/L (ref 136–145)
TRIGL SERPL-MCNC: 94 MG/DL (ref 35–150)
TSH, 3RD GENERATION: 2.03 UIU/ML (ref 0.36–3.74)
VLDLC SERPL CALC-MCNC: 18.8 MG/DL (ref 6–23)

## 2022-10-13 ENCOUNTER — OFFICE VISIT (OUTPATIENT)
Dept: INTERNAL MEDICINE CLINIC | Facility: CLINIC | Age: 82
End: 2022-10-13
Payer: MEDICARE

## 2022-10-13 VITALS — WEIGHT: 154.4 LBS | BODY MASS INDEX: 24.23 KG/M2 | HEIGHT: 67 IN

## 2022-10-13 DIAGNOSIS — E78.00 PURE HYPERCHOLESTEROLEMIA: ICD-10-CM

## 2022-10-13 DIAGNOSIS — I10 PRIMARY HYPERTENSION: ICD-10-CM

## 2022-10-13 DIAGNOSIS — I48.0 PAROXYSMAL ATRIAL FIBRILLATION (HCC): ICD-10-CM

## 2022-10-13 DIAGNOSIS — N18.31 TYPE 2 DIABETES MELLITUS WITH STAGE 3A CHRONIC KIDNEY DISEASE, WITHOUT LONG-TERM CURRENT USE OF INSULIN (HCC): ICD-10-CM

## 2022-10-13 DIAGNOSIS — Z23 ENCOUNTER FOR IMMUNIZATION: Primary | ICD-10-CM

## 2022-10-13 DIAGNOSIS — E03.9 ACQUIRED HYPOTHYROIDISM: ICD-10-CM

## 2022-10-13 DIAGNOSIS — E11.9 TYPE 2 DIABETES MELLITUS WITHOUT COMPLICATION, WITHOUT LONG-TERM CURRENT USE OF INSULIN (HCC): ICD-10-CM

## 2022-10-13 DIAGNOSIS — I87.2 VENOUS INSUFFICIENCY OF BOTH LOWER EXTREMITIES: ICD-10-CM

## 2022-10-13 DIAGNOSIS — I25.10 CORONARY ARTERY DISEASE INVOLVING NATIVE CORONARY ARTERY OF NATIVE HEART WITHOUT ANGINA PECTORIS: ICD-10-CM

## 2022-10-13 DIAGNOSIS — E11.22 TYPE 2 DIABETES MELLITUS WITH STAGE 3A CHRONIC KIDNEY DISEASE, WITHOUT LONG-TERM CURRENT USE OF INSULIN (HCC): ICD-10-CM

## 2022-10-13 DIAGNOSIS — N18.31 STAGE 3A CHRONIC KIDNEY DISEASE (HCC): ICD-10-CM

## 2022-10-13 PROCEDURE — 90694 VACC AIIV4 NO PRSRV 0.5ML IM: CPT | Performed by: INTERNAL MEDICINE

## 2022-10-13 PROCEDURE — 99214 OFFICE O/P EST MOD 30 MIN: CPT | Performed by: INTERNAL MEDICINE

## 2022-10-13 PROCEDURE — 3044F HG A1C LEVEL LT 7.0%: CPT | Performed by: INTERNAL MEDICINE

## 2022-10-13 PROCEDURE — G0008 ADMIN INFLUENZA VIRUS VAC: HCPCS | Performed by: INTERNAL MEDICINE

## 2022-10-13 PROCEDURE — G8400 PT W/DXA NO RESULTS DOC: HCPCS | Performed by: INTERNAL MEDICINE

## 2022-10-13 PROCEDURE — 1123F ACP DISCUSS/DSCN MKR DOCD: CPT | Performed by: INTERNAL MEDICINE

## 2022-10-13 PROCEDURE — 1090F PRES/ABSN URINE INCON ASSESS: CPT | Performed by: INTERNAL MEDICINE

## 2022-10-13 PROCEDURE — G8428 CUR MEDS NOT DOCUMENT: HCPCS | Performed by: INTERNAL MEDICINE

## 2022-10-13 PROCEDURE — 1036F TOBACCO NON-USER: CPT | Performed by: INTERNAL MEDICINE

## 2022-10-13 PROCEDURE — G8484 FLU IMMUNIZE NO ADMIN: HCPCS | Performed by: INTERNAL MEDICINE

## 2022-10-13 PROCEDURE — G8420 CALC BMI NORM PARAMETERS: HCPCS | Performed by: INTERNAL MEDICINE

## 2022-10-13 RX ORDER — METFORMIN HYDROCHLORIDE 500 MG/1
500 TABLET, EXTENDED RELEASE ORAL
Qty: 90 TABLET | Refills: 1 | Status: SHIPPED | OUTPATIENT
Start: 2022-10-13

## 2022-10-13 ASSESSMENT — ENCOUNTER SYMPTOMS
RECTAL PAIN: 0
STRIDOR: 0
VOICE CHANGE: 0
EYE PAIN: 0

## 2022-10-13 NOTE — PROGRESS NOTES
FOLLOWUP VISIT    Subjective:    Ms. Paige Chaudhry is a 79 y.o., female,   Chief Complaint   Patient presents with    Follow-up Chronic Condition    Flu Vaccine       HPI:    Patient presents today for follow up of two or more chronic medical problems and review of labs. The patient has diabetes mellitus. The patient denies any symptoms of hypo or hyperglycemia. The patient has been attempting to be compliant with an ADA diet and an exercise program.     The patient has hypertension. The patient has been on an attempted low sodium diet and has been trying to exercise and maintain a healthy weight. The patient reports good compliance with the blood pressure medications. The patient has hypothyroidism. The patient denies any symptoms of hypo- or hyperthyroidism on the current dose of levothyroxine. The patient has hyperlipidemia. The patient has been following a low cholesterol diet and denies any myalgias or weakness on current lipid lowering therapy. The following portions of the patient's history were reviewed and updated as appropriate:      Past Medical History:   Diagnosis Date    Acne rosacea     CKD (chronic kidney disease) stage 3, GFR 30-59 ml/min ([facility]) 4/12/2013    Coronary atherosclerosis of native coronary artery 04/12/2013 4/16/13 (Dr. Greene Agent) Coronary artery bypass grafting x4. Grafts consisting  of:  1. Left internal mammary artery to left anterior descending. 2. Reverse saphenous vein to the diagonal.  3. Reverse saphenous vein to the obtuse marginal.  4. Reverse saphenous vein graft to the posterior descending artery.       Diabetes mellitus, type 2 ([facility] [address]     History of non-ST elevation myocardial infarction (NSTEMI) 04/12/2013    History of unstable angina 09/23/2015    Hyperlipidemia 04/12/2013    Hypertension     Hypothyroid 4/12/2013    Osteoarthritis     PAF (paroxysmal atrial fibrillation) ([facility] [address] 4/22/2013       Past Surgical History:   Procedure Laterality Date BLEPHAROPLASTY      BUNIONECTOMY      CORONARY ARTERY BYPASS GRAFT  4/16/13    x 4 - Dr. Jada Chavira         Family History   Problem Relation Age of Onset    Heart Disease Mother     Heart Disease Father     Cancer Sister        Social History     Socioeconomic History    Marital status:      Spouse name: Not on file    Number of children: Not on file    Years of education: Not on file    Highest education level: Not on file   Occupational History    Not on file   Tobacco Use    Smoking status: Never    Smokeless tobacco: Never   Substance and Sexual Activity    Alcohol use: Yes     Alcohol/week: 1.7 standard drinks    Drug use: Never    Sexual activity: Not on file   Other Topics Concern    Not on file   Social History Narrative    Not on file     Social Determinants of Health     Financial Resource Strain: Not on file   Food Insecurity: Not on file   Transportation Needs: Not on file   Physical Activity: Not on file   Stress: Not on file   Social Connections: Not on file   Intimate Partner Violence: Not on file   Housing Stability: Not on file       Current Outpatient Medications   Medication Sig Dispense Refill    levothyroxine (SYNTHROID) 50 MCG tablet Take 1 tablet by mouth Daily TAKE 1 TABLET BY MOUTH EVERY DAY BEFORE BREAKFAST 90 tablet 1    meloxicam (MOBIC) 7.5 MG tablet Take 1 tablet by mouth daily as needed for Pain 30 tablet 1    apixaban (ELIQUIS) 5 MG TABS tablet Take 1 tablet by mouth in the morning and 1 tablet before bedtime.  180 tablet 3    metoprolol succinate (TOPROL XL) 100 MG extended release tablet Take 1 tablet by mouth daily 90 tablet 1    atorvastatin (LIPITOR) 40 MG tablet Take 40 mg by mouth daily      cyclobenzaprine (FLEXERIL) 5 MG tablet Take 5 mg by mouth daily as needed      furosemide (LASIX) 20 MG tablet Take 20 mg by mouth daily as needed      losartan (COZAAR) 100 MG tablet Take 100 mg by mouth daily      metFORMIN (GLUCOPHAGE-XR) 500 MG extended release tablet TAKE 1 TABLET BY MOUTH EVERY DAY WITH DINNER      nitroGLYCERIN (NITROSTAT) 0.4 MG SL tablet Place 0.4 mg under the tongue       No current facility-administered medications for this visit. Allergies as of 10/13/2022 - Fully Reviewed 08/17/2022   Allergen Reaction Noted    Codeine Nausea And Vomiting 10/15/2009       Review of Systems   Constitutional:  Negative for activity change and appetite change. HENT:  Negative for drooling and voice change. Eyes:  Negative for pain. Respiratory:  Negative for stridor. Gastrointestinal:  Negative for rectal pain. Endocrine: Negative for polydipsia and polyphagia. Genitourinary:  Negative for dyspareunia and enuresis. Musculoskeletal:  Negative for gait problem and neck stiffness. Skin:  Negative for pallor. Neurological:  Negative for facial asymmetry and speech difficulty. Hematological:  Does not bruise/bleed easily. Psychiatric/Behavioral:  Negative for self-injury. The patient is not hyperactive. Patient Care Team:  Olinda Figueredo MD as PCP - General  Olinda Figueredo MD as PCP - [facility] Empaneled Provider    Objective:    Ht 5' 6.5\" (1.689 m)   Wt 154 lb 6.4 oz (70 kg)   BMI 24.55 kg/m²     Physical Exam  Vitals reviewed. Constitutional:       General: She is not in acute distress. Appearance: She is not toxic-appearing. HENT:      Head: Normocephalic and atraumatic. Right Ear: Tympanic membrane, ear canal and external ear normal.      Left Ear: Tympanic membrane, ear canal and external ear normal.      Nose: Nose normal.      Mouth/Throat:      Mouth: Mucous membranes are moist.      Pharynx: Oropharynx is clear. Eyes:      General: No scleral icterus. Extraocular Movements: Extraocular movements intact. Pupils: Pupils are equal, round, and reactive to light. Cardiovascular:      Rate and Rhythm: Normal rate and regular rhythm. Pulses: Normal pulses. Heart sounds: Normal heart sounds.    Pulmonary: Effort: Pulmonary effort is normal. No respiratory distress. Breath sounds: Normal breath sounds. No stridor. Abdominal:      General: Abdomen is flat. Bowel sounds are normal.      Palpations: Abdomen is soft. There is no mass. Tenderness: There is no guarding or rebound. Musculoskeletal:         General: Normal range of motion. Cervical back: Normal range of motion and neck supple. Skin:     General: Skin is warm and dry. Coloration: Skin is not jaundiced. Neurological:      Mental Status: She is alert and oriented to person, place, and time. Mental status is at baseline. Psychiatric:         Behavior: Behavior normal.         Thought Content:  Thought content normal.            Orders Only on 10/07/2022   Component Date Value Ref Range Status    Hemoglobin A1C 10/07/2022 6.3 (A)  4.8 - 5.6 % Final    eAG 10/07/2022 134  mg/dL Final    Comment: Reference Range  Normal: 4.8-5.6  Diabetic >=6.5%  Normal       <5.7%      TSH, 3RD GENERATION 10/07/2022 2.030  0.358 - 3.740 uIU/mL Final    Cholesterol, Total 10/07/2022 128  <200 MG/DL Final    Comment: Borderline High: 200-239 mg/dL  High: Greater than or equal to 240 mg/dL      Triglycerides 10/07/2022 94  35 - 150 MG/DL Final    Comment: Borderline High: 150-199 mg/dL, High: 200-499 mg/dL  Very High: Greater than or equal to 500 mg/dL      HDL 10/07/2022 48  40 - 60 MG/DL Final    LDL Calculated 10/07/2022 61.2  <100 MG/DL Final    Comment: Near Optimal: 100-129 mg/dL  Borderline High: 130-159, High: 160-189 mg/dL  Very High: Greater than or equal to 190 mg/dL      VLDL Cholesterol Calculated 10/07/2022 18.8  6.0 - 23.0 MG/DL Final    Chol/HDL Ratio 10/07/2022 2.7    Final    Sodium 10/07/2022 142  136 - 145 mmol/L Final    Potassium 10/07/2022 4.1  3.5 - 5.1 mmol/L Final    Chloride 10/07/2022 111 (A)  101 - 110 mmol/L Final    CO2 10/07/2022 22  21 - 32 mmol/L Final    Anion Gap 10/07/2022 9  4 - 13 mmol/L Final    Glucose 10/07/2022 103 (A)  65 - 100 mg/dL Final    BUN 10/07/2022 21  8 - 23 MG/DL Final    Creatinine 10/07/2022 1.10 (A)  0.6 - 1.0 MG/DL Final    Est, Glom Filt Rate 10/07/2022 50 (A)  >60 ml/min/1.73m2 Final    Comment:   Pediatric calculator link: Richard.[email or web address] org/professionals/kdoqi/gfr_calculatorped    Effective Oct 3, 2022    These results are not intended for use in patients <25years of age. eGFR results are calculated without a race factor using  the 2021 CKD-EPI equation. Careful clinical correlation is recommended, particularly when comparing to results calculated using previous equations. The CKD-EPI equation is less accurate in patients with extremes of muscle mass, extra-renal metabolism of creatinine, excessive creatine ingestion, or following therapy that affects renal tubular secretion. Calcium 10/07/2022 9.3  8.3 - 10.4 MG/DL Final    Total Bilirubin 10/07/2022 0.6  0.2 - 1.1 MG/DL Final    ALT 10/07/2022 26  12 - 65 U/L Final    AST 10/07/2022 23  15 - 37 U/L Final    Alk Phosphatase 10/07/2022 118  50 - 136 U/L Final    Total Protein 10/07/2022 6.5  6.3 - 8.2 g/dL Final    Albumin 10/07/2022 3.8  3.2 - 4.6 g/dL Final    Globulin 10/07/2022 2.7  2.3 - 3.5 g/dL Final    Albumin/Globulin Ratio 10/07/2022 1.4  1.2 - 3.5   Final         Assessent & Plan:        1. Encounter for immunization  -     Influenza, FLUAD, (age 72 y+), IM, Preservative Free, 0.5 mL  2. Coronary artery disease involving native coronary artery of native heart without angina pectoris  Overview: Followed by cardiology Dr. Jayde Schafer. S/P NSTEMI followed by CABG in 2013 (LIMA-LAD, SVG-Diag, SVG-OM, SVG-PDA). The patient will continue aggressive medical therapy with Eliquis and atorvastatin 40 mg daily. Orders:  -     Comprehensive Metabolic Panel; Future  3.  Stage 3a chronic kidney disease ([address]  Overview:  12/21/21 creatinine 1.07, eGFR 48  5/27/21 creatinine 1.06, eGFR 49    The patient was advised to avoid NSAIDs and other nephrotoxins. Will dose adjust medications as appropriate. Will monitor labs over time. 4. Type 2 diabetes mellitus without complication, without long-term current use of insulin (HCC)  Overview:  10/7/22 , A1C 6.3  4/4/22 fasting glucose 88, A1C 6.6, urine microalbumin negative. Labs were reviewed and discussed with the patient. The patient will continue the current treatment with metformin  mg daily. Orders:  -     Hemoglobin A1C; Future  -     Microalbumin / Creatinine Urine Ratio; Future  5. Type 2 diabetes mellitus with stage 3a chronic kidney disease, without long-term current use of insulin (HCC)  Overview:  10/7/22 , A1C 6.3  4/4/22 fasting glucose 88, A1C 6.6, urine microalbumin negative. Labs were reviewed and discussed with the patient. The patient will continue the current treatment with metformin  mg daily. Orders:  -     metFORMIN (GLUCOPHAGE-XR) 500 MG extended release tablet; Take 1 tablet by mouth Daily with supper TAKE 1 TABLET BY MOUTH EVERY DAY WITH DINNER, Disp-90 tablet, R-1Normal  6. Pure hypercholesterolemia  Overview:  10/7/22 total 128 HDL 48 LDL 61 TG 94 on atorvastatin 40 mg daily. Reviewed the most recent lipid panel with the patient, and interpreted the results within the context of the patient's personal cardiovascular risk factors. Discussed/reinforced a low-cholesterol diet. The patient will continue current intensity statin therapy. Orders:  -     Lipid Panel; Future  7. Primary hypertension  Overview:  Well controlled on current medications. The patient will continue the current treatment. 8. Acquired hypothyroidism  Overview:  10/7/22 TSH 2.030 on levothyroxine 50 mcg daily. Labs were reviewed and discussed with the patient. The patient will continue the current treatment. Orders:  -     TSH; Future  9.  Paroxysmal atrial fibrillation (HCC)  Overview:  The patient will continue metoprolol succinate for rate control and Eliquis for stroke prevention. May 2022- 2 week monitor  9% burden of both atrial fibrillation and typical atrial flutter with variable conduction and RVR  Average rate while in afib/flutter is ~125. No significant bradycardia or block  Rare aberrant conduction  Reported symptoms generally correlate with RVR. 10. Venous insufficiency of both lower extremities  Overview:  Lasix 20 mg daily PRN. The patient was advised to call for reevaluation should the symptoms fail to improve with treatment. The patient and/or patient representative voiced understanding and agreement with the current diagnoses, recommendations, and possible side effects. Return in about 6 months (around 4/13/2023) for review labs, annual wellness.

## 2022-12-22 ENCOUNTER — PATIENT MESSAGE (OUTPATIENT)
Dept: CARDIOLOGY CLINIC | Age: 82
End: 2022-12-22

## 2022-12-22 RX ORDER — METOPROLOL SUCCINATE 100 MG/1
100 TABLET, EXTENDED RELEASE ORAL DAILY
Qty: 90 TABLET | Refills: 1 | Status: SHIPPED | OUTPATIENT
Start: 2022-12-22

## 2022-12-22 NOTE — TELEPHONE ENCOUNTER
From: Emely Paniagua  To: Dr. Compa Scherer: 12/22/2022 10:22 AM EST  Subject: Metoprolol Succ  mg    Dr. Hui Ascencio,    Please refill the above medication for me at [place] [id number]. Thank you very much. See you in January 2023    Have a very Shaina Christopher Moses and good tidings to you in 2023!     Joie Ahumada

## 2023-01-12 ENCOUNTER — OFFICE VISIT (OUTPATIENT)
Dept: INTERNAL MEDICINE CLINIC | Facility: CLINIC | Age: 83
End: 2023-01-12
Payer: MEDICARE

## 2023-01-12 VITALS
WEIGHT: 157.2 LBS | DIASTOLIC BLOOD PRESSURE: 62 MMHG | HEART RATE: 76 BPM | SYSTOLIC BLOOD PRESSURE: 112 MMHG | OXYGEN SATURATION: 97 % | BODY MASS INDEX: 24.67 KG/M2 | HEIGHT: 67 IN

## 2023-01-12 DIAGNOSIS — I48.0 PAROXYSMAL ATRIAL FIBRILLATION (HCC): ICD-10-CM

## 2023-01-12 DIAGNOSIS — N18.31 TYPE 2 DIABETES MELLITUS WITH STAGE 3A CHRONIC KIDNEY DISEASE, WITHOUT LONG-TERM CURRENT USE OF INSULIN (HCC): ICD-10-CM

## 2023-01-12 DIAGNOSIS — E78.00 PURE HYPERCHOLESTEROLEMIA: ICD-10-CM

## 2023-01-12 DIAGNOSIS — E03.9 ACQUIRED HYPOTHYROIDISM: ICD-10-CM

## 2023-01-12 DIAGNOSIS — N18.31 STAGE 3A CHRONIC KIDNEY DISEASE (HCC): ICD-10-CM

## 2023-01-12 DIAGNOSIS — I25.118 ATHEROSCLEROSIS OF NATIVE CORONARY ARTERY OF NATIVE HEART WITH STABLE ANGINA PECTORIS (HCC): ICD-10-CM

## 2023-01-12 DIAGNOSIS — E11.22 TYPE 2 DIABETES MELLITUS WITH STAGE 3A CHRONIC KIDNEY DISEASE, WITHOUT LONG-TERM CURRENT USE OF INSULIN (HCC): ICD-10-CM

## 2023-01-12 PROCEDURE — 3074F SYST BP LT 130 MM HG: CPT | Performed by: INTERNAL MEDICINE

## 2023-01-12 PROCEDURE — 3078F DIAST BP <80 MM HG: CPT | Performed by: INTERNAL MEDICINE

## 2023-01-12 PROCEDURE — G8420 CALC BMI NORM PARAMETERS: HCPCS | Performed by: INTERNAL MEDICINE

## 2023-01-12 PROCEDURE — G8427 DOCREV CUR MEDS BY ELIG CLIN: HCPCS | Performed by: INTERNAL MEDICINE

## 2023-01-12 PROCEDURE — G8484 FLU IMMUNIZE NO ADMIN: HCPCS | Performed by: INTERNAL MEDICINE

## 2023-01-12 PROCEDURE — 99214 OFFICE O/P EST MOD 30 MIN: CPT | Performed by: INTERNAL MEDICINE

## 2023-01-12 PROCEDURE — 1123F ACP DISCUSS/DSCN MKR DOCD: CPT | Performed by: INTERNAL MEDICINE

## 2023-01-12 PROCEDURE — 1036F TOBACCO NON-USER: CPT | Performed by: INTERNAL MEDICINE

## 2023-01-12 PROCEDURE — G8400 PT W/DXA NO RESULTS DOC: HCPCS | Performed by: INTERNAL MEDICINE

## 2023-01-12 PROCEDURE — 1090F PRES/ABSN URINE INCON ASSESS: CPT | Performed by: INTERNAL MEDICINE

## 2023-01-12 ASSESSMENT — ENCOUNTER SYMPTOMS
CHEST TIGHTNESS: 0
SHORTNESS OF BREATH: 0
ABDOMINAL DISTENTION: 0
DIARRHEA: 1
ABDOMINAL PAIN: 0
BACK PAIN: 1
CONSTIPATION: 0
COUGH: 0

## 2023-01-12 NOTE — ASSESSMENT & PLAN NOTE
Asymptomatic, continue current medications and aded Jardiance    Key Antihyperglycemic Medications          empagliflozin (JARDIANCE) 10 MG tablet (Taking)    Sig - Route: Take 1 tablet by mouth daily - Oral        Key CAD CHF Meds          metoprolol succinate (TOPROL XL) 100 MG extended release tablet (Taking)    Sig - Route: Take 1 tablet by mouth daily - Oral    apixaban (ELIQUIS) 5 MG TABS tablet (Taking)    Sig - Route:  Take 1 tablet by mouth in the morning and 1 tablet before bedtime. - Oral    atorvastatin (LIPITOR) 40 MG tablet (Taking)    Class: Historical Med    furosemide (LASIX) 20 MG tablet (Taking)    Class: Historical Med    losartan (COZAAR) 100 MG tablet (Taking)    Class: Historical Med

## 2023-01-12 NOTE — ASSESSMENT & PLAN NOTE
At goal, changes made today: dc metformine and start Jardiance , medication information shared with patient , medication adherence emphasized and lifestyle modifications recommended   We also discussed about goals of blood sugars at her age, we may establish new goal to keep the hemoglobin A1c around seven 7.5, she will continue with lifestyle modification, dietary changes, physical activity, we may try Jardiance this time, but if any side effects we may consider treatment with lifestyle modification alone. 04-Jan-2020 17:29

## 2023-01-12 NOTE — ASSESSMENT & PLAN NOTE
At goal, continue current medications   Lab Results   Component Value Date    TSH 3.120 12/21/2021    [id number] 2.030 10/07/2022

## 2023-01-12 NOTE — ASSESSMENT & PLAN NOTE
At goal, continue current medications and medication adherence emphasized   Lab Results   Component Value Date    LDLCALC 61.2 10/07/2022

## 2023-01-12 NOTE — ASSESSMENT & PLAN NOTE
Monitored by specialist- no acute findings meriting change in the plan   Well control  Key CAD CHF Meds          metoprolol succinate (TOPROL XL) 100 MG extended release tablet (Taking)    Sig - Route: Take 1 tablet by mouth daily - Oral    apixaban (ELIQUIS) 5 MG TABS tablet (Taking)    Sig - Route:  Take 1 tablet by mouth in the morning and 1 tablet before bedtime. - Oral    atorvastatin (LIPITOR) 40 MG tablet (Taking)    Class: Historical Med    furosemide (LASIX) 20 MG tablet (Taking)    Class: Historical Med    losartan (COZAAR) 100 MG tablet (Taking)    Class: Historical Med

## 2023-01-12 NOTE — PROGRESS NOTES
Chief Complaint   Patient presents with    Medication Problem     Pt questions side-effects of Metformin and Metoprolol. Abdominal discomfort, diarrhea \"quite often\", nausea, occ.heartburn, \"constant low back discomfort\", muscle weakness  Didn't take Metformin on Monday and Tuesday am but took it on Tuesday night and yesterday. Galina Florez is a 79 y.o. female who presents today for Medication Problem (Pt questions side-effects of Metformin and Metoprolol. /Abdominal discomfort, diarrhea \"quite often\", nausea, occ.heartburn, \"constant low back discomfort\", muscle weakness  Didn't take Metformin on Monday and Tuesday am but took it on Tuesday night and yesterday. )     Here for first visit with me, patient is transferring care from Dr. Srini Espino to a female provider as she feels more comfortable,  She lives with her , has 2 kids, she is originally from [place], was living in other states like [place], [place], moved to [place] many years ago, but relocated to [place] a year and a half ago to be closer to family, and downsizing. She reports being very active in the community, and [employer], she likes to play tennis  Has a complex medical history that includes diabetes, paroxysmal A. fib, coronary artery disease a status post bypass in 2013, hyperlipidemia, hypertension, chronic kidney disease stage III,  By her conditions has been in optimal control with current medications,  Today she is concerned about side effects from metformin, she believes the medication has been causing her diarrhea, which has been interfering with her daily activities including playing tennis. She is currently following with a spine specialist for lower back pain,  Taking anticoagulation, and following with cardiology for cardiac issues.   She has been taking the metformin only for a year, and her hemoglobin A1c is below 6.3  History hypothyroidism, currently taking Ludaxine, denies any side effects    Wt Readings from Last 3 Encounters:   01/12/23 157 lb 3.2 oz (71.3 kg)   10/13/22 154 lb 6.4 oz (70 kg)   08/17/22 158 lb (71.7 kg)     Vitals:    01/12/23 1148   BP: 112/62   Site: Left Upper Arm   Position: Sitting   Pulse: 76   SpO2: 97%   Weight: 157 lb 3.2 oz (71.3 kg)   Height: 5' 6.5\" (1.689 m)        Assessment and plan:  1. Type 2 diabetes mellitus with stage 3a chronic kidney disease, without long-term current use of insulin (HCC)  Assessment & Plan:   At goal, changes made today: dc metformine and start Jardiance , medication information shared with patient , medication adherence emphasized and lifestyle modifications recommended   We also discussed about goals of blood sugars at her age, we may establish new goal to keep the hemoglobin A1c around seven 7.5, she will continue with lifestyle modification, dietary changes, physical activity, we may try Jardiance this time, but if any side effects we may consider treatment with lifestyle modification alone. Orders:  -     CBC with Auto Differential; Future  -     Lipid Panel; Future  -     Hemoglobin A1C; Future  -     Microalbumin / Creatinine Urine Ratio; Future  -     Comprehensive Metabolic Panel; Future  -     TSH with Reflex; Future  -     empagliflozin (JARDIANCE) 10 MG tablet; Take 1 tablet by mouth daily, Disp-30 tablet, R-1Normal  -     Hemoglobin A1C; Future  -     Comprehensive Metabolic Panel; Future  2. Paroxysmal atrial fibrillation (HCC)  Assessment & Plan:   Monitored by specialist- no acute findings meriting change in the plan on metoprolol and eliquis  3. Stage 3a chronic kidney disease ([facility] [address]  Assessment & Plan:   Asymptomatic, continue current medications and aded Jardiance    Key Antihyperglycemic Medications            empagliflozin (JARDIANCE) 10 MG tablet (Taking)    Sig - Route:  Take 1 tablet by mouth daily - Oral          Key CAD CHF Meds            metoprolol succinate (TOPROL XL) 100 MG extended release tablet (Taking)    Sig - Route: Take 1 tablet by mouth daily - Oral    apixaban (ELIQUIS) 5 MG TABS tablet (Taking)    Sig - Route: Take 1 tablet by mouth in the morning and 1 tablet before bedtime. - Oral    atorvastatin (LIPITOR) 40 MG tablet (Taking)    Class: Historical Med    furosemide (LASIX) 20 MG tablet (Taking)    Class: Historical Med    losartan (COZAAR) 100 MG tablet (Taking)    Class: Historical Med            4. Atherosclerosis of native coronary artery of native heart with stable angina pectoris [facility])  Assessment & Plan:   Monitored by specialist- no acute findings meriting change in the plan   Well control  Key CAD CHF Meds            metoprolol succinate (TOPROL XL) 100 MG extended release tablet (Taking)    Sig - Route: Take 1 tablet by mouth daily - Oral    apixaban (ELIQUIS) 5 MG TABS tablet (Taking)    Sig - Route: Take 1 tablet by mouth in the morning and 1 tablet before bedtime. - Oral    atorvastatin (LIPITOR) 40 MG tablet (Taking)    Class: Historical Med    furosemide (LASIX) 20 MG tablet (Taking)    Class: Historical Med    losartan (COZAAR) 100 MG tablet (Taking)    Class: Historical Med            5. Acquired hypothyroidism  Assessment & Plan:   At goal, continue current medications   Lab Results   Component Value Date    TSH 3.120 12/21/2021    CXJ9IIR 2.030 10/07/2022       6. Pure hypercholesterolemia  Assessment & Plan:   At goal, continue current medications and medication adherence emphasized   Lab Results   Component Value Date    LDLCALC 61.2 10/07/2022         Return in about 3 months (around 4/12/2023) for AWV, diabetes, HTN. Review of system:    Review of Systems   Constitutional:  Negative for activity change, chills, fatigue and fever. Respiratory:  Negative for cough, chest tightness and shortness of breath. Cardiovascular:  Negative for chest pain. Gastrointestinal:  Positive for diarrhea. Negative for abdominal distention, abdominal pain and constipation.    Musculoskeletal:  Positive for back pain. Neurological:  Negative for dizziness and headaches. Psychiatric/Behavioral:  The patient is not nervous/anxious. Immunization history:    Immunization History   Administered Date(s) Administered    COVID-19, PFIZER GRAY top, DO NOT Dilute, (age 15 y+), IM, 30 mcg/0.3 mL 04/14/2022    COVID-19, PFIZER PURPLE top, DILUTE for use, (age 15 y+), 30mcg/0.3mL 01/22/2021, 02/13/2021, 10/14/2021    INFLUENZA, INTRADERMAL, QUADRIVALENT, PRESERVATIVE FREE 11/12/2015    Influenza Virus Vaccine 11/07/2008, 10/01/2016, 11/08/2017    Influenza, FLUAD, (age 72 y+), Adjuvanted, 0.5mL 09/29/2021, 10/13/2022    Influenza, FLUZONE (age 72 y+), High Dose, 0.7mL 10/27/2020    Influenza, High Dose (Fluzone 65 yrs and older) 11/28/2016, 11/08/2018, 10/02/2019    Influenza, Triv, 3 Years and older, IM (Afluria (5 yrs and older) 10/12/2009, 10/12/2010, 12/02/2011, 11/21/2012    Influenza, Triv, 3 Years and older, IM, PF (Afluria 5yrs and older) 11/07/2013, 11/17/2014    Pneumococcal Conjugate 13-valent ([id number]) 01/09/2018    Pneumococcal Polysaccharide ([id number]) 01/01/1990, 10/01/2014    Tdap (Boostrix, Adacel) 10/02/2019    Zoster Live (Zostavax) 10/01/2015    Zoster Recombinant (Shingrix) 12/04/2020, 03/01/2021       Current medications:      Current Outpatient Medications:     empagliflozin (JARDIANCE) 10 MG tablet, Take 1 tablet by mouth daily, Disp: 30 tablet, Rfl: 1    metoprolol succinate (TOPROL XL) 100 MG extended release tablet, Take 1 tablet by mouth daily, Disp: 90 tablet, Rfl: 1    levothyroxine (SYNTHROID) 50 MCG tablet, Take 1 tablet by mouth Daily TAKE 1 TABLET BY MOUTH EVERY DAY BEFORE BREAKFAST, Disp: 90 tablet, Rfl: 1    apixaban (ELIQUIS) 5 MG TABS tablet, Take 1 tablet by mouth in the morning and 1 tablet before bedtime. , Disp: 180 tablet, Rfl: 3    atorvastatin (LIPITOR) 40 MG tablet, Take 40 mg by mouth daily, Disp: , Rfl:     cyclobenzaprine (FLEXERIL) 5 MG tablet, Take 5 mg by mouth daily as needed, Disp: , Rfl:     furosemide (LASIX) 20 MG tablet, Take 20 mg by mouth daily as needed, Disp: , Rfl:     losartan (COZAAR) 100 MG tablet, Take 100 mg by mouth daily, Disp: , Rfl:     nitroGLYCERIN (NITROSTAT) 0.4 MG SL tablet, Place 0.4 mg under the tongue, Disp: , Rfl:     meloxicam (MOBIC) 7.5 MG tablet, Take 1 tablet by mouth daily as needed for Pain, Disp: 30 tablet, Rfl: 1      Family history:    Family History   Problem Relation Age of Onset    Heart Disease Mother     Heart Disease Father     Cancer Sister         Past medical history:    Past Medical History:   Diagnosis Date    Acne rosacea     CKD (chronic kidney disease) stage 3, GFR 30-59 ml/min ([facility] [address] 4/12/2013    Coronary atherosclerosis of native coronary artery 04/12/2013 4/16/13 (Dr. Tamara Contreras) Coronary artery bypass grafting x4. Grafts consisting  of:  1. Left internal mammary artery to left anterior descending. 2. Reverse saphenous vein to the diagonal.  3. Reverse saphenous vein to the obtuse marginal.  4. Reverse saphenous vein graft to the posterior descending artery. Diabetes mellitus, type 2 ([facility] [address]     History of non-ST elevation myocardial infarction (NSTEMI) 04/12/2013    History of unstable angina 09/23/2015    Hyperlipidemia 04/12/2013    Hypertension     Hypothyroid 4/12/2013    Osteoarthritis     PAF (paroxysmal atrial fibrillation) ([facility] [address] 4/22/2013        Past Surgical History:   Procedure Laterality Date    BLEPHAROPLASTY      BUNIONECTOMY      CORONARY ARTERY BYPASS GRAFT  4/16/13    x 4 - Dr. Alice Small          Physical exam:    /62 (Site: Left Upper Arm, Position: Sitting)   Pulse 76   Ht 5' 6.5\" (1.689 m)   Wt 157 lb 3.2 oz (71.3 kg)   SpO2 97%   BMI 24.99 kg/m²     Physical Exam  Vitals reviewed. Constitutional:       Appearance: Normal appearance. HENT:      Head: Normocephalic and atraumatic. Cardiovascular:      Rate and Rhythm: Normal rate and regular rhythm.    Pulmonary: Effort: Pulmonary effort is normal.      Breath sounds: Normal breath sounds. Abdominal:      Palpations: Abdomen is soft. Neurological:      General: No focal deficit present. Mental Status: She is alert and oriented to person, place, and time. Psychiatric:         Mood and Affect: Mood normal.         Behavior: Behavior normal.        Recent labs:    Lab Results   Component Value Date    CHOL 128 10/07/2022    CHOL 157 05/27/2021     Lab Results   Component Value Date    TRIG 94 10/07/2022    TRIG 146 05/27/2021     Lab Results   Component Value Date    HDL 48 10/07/2022    HDL 54 05/27/2021     Lab Results   Component Value Date    LDLCALC 61.2 10/07/2022    LDLCALC 78 05/27/2021     Lab Results   Component Value Date    LABVLDL 18.8 10/07/2022    VLDL 25 05/27/2021     Lab Results   Component Value Date    CHOLHDLRATIO 2.7 10/07/2022     Lab Results   Component Value Date     10/07/2022    K 4.1 10/07/2022     (H) 10/07/2022    CO2 22 10/07/2022    BUN 21 10/07/2022    CREATININE 1.10 (H) 10/07/2022    GLUCOSE 103 (H) 10/07/2022    CALCIUM 9.3 10/07/2022    PROT 6.5 10/07/2022    LABALBU 3.8 10/07/2022    BILITOT 0.6 10/07/2022    ALKPHOS 118 10/07/2022    AST 23 10/07/2022    ALT 26 10/07/2022    LABGLOM 50 (L) 10/07/2022    GFRAA 56 (L) 12/21/2021    AGRATIO 1.7 12/21/2021    GLOB 2.7 10/07/2022     Lab Results   Component Value Date    WBC 8.6 06/09/2022    HGB 14.2 06/09/2022    HCT 44.2 06/09/2022    MCV 91.1 06/09/2022     06/09/2022             This document was generated with the aid of voice recognition software. . Please be aware that there may be inadvertent transcription errors not identified and corrected by the [employer]

## 2023-01-13 DIAGNOSIS — E11.22 TYPE 2 DIABETES MELLITUS WITH STAGE 3A CHRONIC KIDNEY DISEASE, WITHOUT LONG-TERM CURRENT USE OF INSULIN (HCC): ICD-10-CM

## 2023-01-13 DIAGNOSIS — N18.31 TYPE 2 DIABETES MELLITUS WITH STAGE 3A CHRONIC KIDNEY DISEASE, WITHOUT LONG-TERM CURRENT USE OF INSULIN (HCC): ICD-10-CM

## 2023-01-13 LAB
ALBUMIN SERPL-MCNC: 3.6 G/DL (ref 3.2–4.6)
ALBUMIN/GLOB SERPL: 1.2 (ref 0.4–1.6)
ALP SERPL-CCNC: 107 U/L (ref 50–136)
ALT SERPL-CCNC: 17 U/L (ref 12–65)
ANION GAP SERPL CALC-SCNC: 6 MMOL/L (ref 2–11)
AST SERPL-CCNC: 16 U/L (ref 15–37)
BASOPHILS # BLD: 0.1 K/UL (ref 0–0.2)
BASOPHILS NFR BLD: 1 % (ref 0–2)
BILIRUB SERPL-MCNC: 1 MG/DL (ref 0.2–1.1)
BUN SERPL-MCNC: 16 MG/DL (ref 8–23)
CALCIUM SERPL-MCNC: 9.2 MG/DL (ref 8.3–10.4)
CHLORIDE SERPL-SCNC: 114 MMOL/L (ref 101–110)
CHOLEST SERPL-MCNC: 137 MG/DL
CO2 SERPL-SCNC: 22 MMOL/L (ref 21–32)
CREAT SERPL-MCNC: 1 MG/DL (ref 0.6–1)
CREAT UR-MCNC: 36 MG/DL
DIFFERENTIAL METHOD BLD: ABNORMAL
EOSINOPHIL # BLD: 0.4 K/UL (ref 0–0.8)
EOSINOPHIL NFR BLD: 6 % (ref 0.5–7.8)
ERYTHROCYTE [DISTWIDTH] IN BLOOD BY AUTOMATED COUNT: 14.8 % (ref 11.9–14.6)
GLOBULIN SER CALC-MCNC: 3 G/DL (ref 2.8–4.5)
GLUCOSE SERPL-MCNC: 107 MG/DL (ref 65–100)
HCT VFR BLD AUTO: 45.4 % (ref 35.8–46.3)
HDLC SERPL-MCNC: 53 MG/DL (ref 40–60)
HDLC SERPL: 2.6
HGB BLD-MCNC: 14.5 G/DL (ref 11.7–15.4)
IMM GRANULOCYTES # BLD AUTO: 0 K/UL (ref 0–0.5)
IMM GRANULOCYTES NFR BLD AUTO: 0 % (ref 0–5)
LDLC SERPL CALC-MCNC: 62.4 MG/DL
LYMPHOCYTES # BLD: 1.2 K/UL (ref 0.5–4.6)
LYMPHOCYTES NFR BLD: 19 % (ref 13–44)
MCH RBC QN AUTO: 29.7 PG (ref 26.1–32.9)
MCHC RBC AUTO-ENTMCNC: 31.9 G/DL (ref 31.4–35)
MCV RBC AUTO: 93 FL (ref 82–102)
MICROALBUMIN UR-MCNC: 0.53 MG/DL (ref 0–3)
MICROALBUMIN/CREAT UR-RTO: 15 MG/G (ref 0–30)
MONOCYTES # BLD: 0.6 K/UL (ref 0.1–1.3)
MONOCYTES NFR BLD: 10 % (ref 4–12)
NEUTS SEG # BLD: 4.1 K/UL (ref 1.7–8.2)
NEUTS SEG NFR BLD: 64 % (ref 43–78)
NRBC # BLD: 0 K/UL (ref 0–0.2)
PLATELET # BLD AUTO: 266 K/UL (ref 150–450)
PMV BLD AUTO: 10.7 FL (ref 9.4–12.3)
POTASSIUM SERPL-SCNC: 4.4 MMOL/L (ref 3.5–5.1)
PROT SERPL-MCNC: 6.6 G/DL (ref 6.3–8.2)
RBC # BLD AUTO: 4.88 M/UL (ref 4.05–5.2)
SODIUM SERPL-SCNC: 142 MMOL/L (ref 133–143)
TRIGL SERPL-MCNC: 108 MG/DL (ref 35–150)
TSH W FREE THYROID IF ABNORMAL: 1.99 UIU/ML (ref 0.36–3.74)
VLDLC SERPL CALC-MCNC: 21.6 MG/DL (ref 6–23)
WBC # BLD AUTO: 6.4 K/UL (ref 4.3–11.1)

## 2023-01-14 LAB
EST. AVERAGE GLUCOSE BLD GHB EST-MCNC: 157 MG/DL
HBA1C MFR BLD: 7.1 % (ref 4.8–5.6)

## 2023-01-23 ENCOUNTER — PATIENT MESSAGE (OUTPATIENT)
Dept: INTERNAL MEDICINE CLINIC | Facility: CLINIC | Age: 83
End: 2023-01-23

## 2023-01-23 DIAGNOSIS — E11.22 TYPE 2 DIABETES MELLITUS WITH STAGE 3A CHRONIC KIDNEY DISEASE, WITHOUT LONG-TERM CURRENT USE OF INSULIN (HCC): Primary | ICD-10-CM

## 2023-01-23 DIAGNOSIS — N18.31 TYPE 2 DIABETES MELLITUS WITH STAGE 3A CHRONIC KIDNEY DISEASE, WITHOUT LONG-TERM CURRENT USE OF INSULIN (HCC): Primary | ICD-10-CM

## 2023-01-23 RX ORDER — METFORMIN HYDROCHLORIDE 500 MG/1
500 TABLET, EXTENDED RELEASE ORAL 2 TIMES DAILY
Qty: 180 TABLET | Refills: 1
Start: 2023-01-23

## 2023-01-27 NOTE — PROGRESS NOTES
[facility] CARDIOLOGY  [address] [facility], [address] [facility], [address] [place] [address]  PHONE: [phone]      23    NAME:  Everardo Jarvis  : 1940  MRN: [id number]         SUBJECTIVE:   Eevrardo Jarvis is a 79 y.o. female seen for a follow up visit regarding the following:     Chief Complaint   Patient presents with    Atrial Fibrillation    Hypertension    Coronary Artery Disease              HPI:  Follow up  Atrial Fibrillation, Hypertension, and Coronary Artery Disease   . Follow up CAD and afib. She saw Dr Noemi Valentino, the referral having been made when symptoms were accelerating but she responded well to increased metoprolol and tolerated it well so they opted to monitor conservatively. She's continued to feel well, estimates an episode of palpitations about once a month. Still playing tennis the next mornings. She did feel a prolonged episode in November most of the day. She continues to play tennis most days as weather allows. She's continued all her usual activities and in fact adding on more. She's considering being the  for [employer], she loves working with numbers. She has had no angina with activity. She's been cutting her atorvastatin in half because of some mild joint pain, but admits she's been painting at her house and noticing the ladder is difficult, low back pain, went to therapy and she was told \"its a crummy back\". Past cardiac history:   13 (Dr. Demi Varela) Coronary artery bypass grafting x4. Grafts consisting    of:    1. Left internal mammary artery to left anterior descending. 2. Reverse saphenous vein to the diagonal.    3. Reverse saphenous vein to the obtuse marginal.    4. Reverse saphenous vein graft to the posterior descending artery.    2020- 7 day monitor showed 1 brief episode of atrial fib/flutter with variable heart rate  2022- 2 week monitor  9% burden of both atrial fibrillation and typical atrial flutter with variable conduction and RVR  Average rate while in afib/flutter is ~125. No significant bradycardia or block  Rare aberrant conduction  Reported symptoms generally correlate with RVR. Key CAD CHF Meds            metoprolol succinate (TOPROL XL) 100 MG extended release tablet (Taking)    Sig - Route: Take 1 tablet by mouth daily - Oral    apixaban (ELIQUIS) 5 MG TABS tablet (Taking)    Sig - Route: Take 1 tablet by mouth in the morning and 1 tablet before bedtime. - Oral    atorvastatin (LIPITOR) 40 MG tablet (Taking)    Class: Historical Med    furosemide (LASIX) 20 MG tablet (Taking)    Class: Historical Med    losartan (COZAAR) 100 MG tablet (Taking)    Class: Historical Med          Key Antihyperglycemic Medications            metFORMIN (GLUCOPHAGE-XR) 500 MG extended release tablet (Taking)    Sig - Route: Take 1 tablet by mouth in the morning and at bedtime - Oral    Patient taking differently: Take 500 mg by mouth daily    Class: NO PRINT                Past Medical History, Past Surgical History, Family history, Social History, and Medications were all reviewed with the patient today and updated as necessary. Prior to Admission medications    Medication Sig Start Date End Date Taking?  Authorizing Provider   nitroGLYCERIN (NITROSTAT) 0.4 MG SL tablet Place 1 tablet under the tongue every 5 minutes as needed for Chest pain 1/30/23  Yes Radha Chan MD   metFORMIN (GLUCOPHAGE-XR) 500 MG extended release tablet Take 1 tablet by mouth in the morning and at bedtime  Patient taking differently: Take 500 mg by mouth daily 1/23/23  Yes Bisi Vasquez MD   metoprolol succinate (TOPROL XL) 100 MG extended release tablet Take 1 tablet by mouth daily 12/22/22  Yes Radha Chan MD   levothyroxine (SYNTHROID) 50 MCG tablet Take 1 tablet by mouth Daily TAKE 1 TABLET BY MOUTH EVERY DAY BEFORE BREAKFAST 9/22/22  Yes Garrett Villafuerte MD   apixaban (ELIQUIS) 5 MG TABS tablet Take 1 tablet by mouth in the morning and 1 tablet before bedtime. 7/26/22  Yes Gabriele Tello MD   atorvastatin (LIPITOR) 40 MG tablet Take 40 mg by mouth daily 1/10/22  Yes Ar Automatic Reconciliation   cyclobenzaprine (FLEXERIL) 5 MG tablet Take 5 mg by mouth daily as needed 4/7/22  Yes Ar Automatic Reconciliation   furosemide (LASIX) 20 MG tablet Take 20 mg by mouth daily as needed 6/28/21  Yes Ar Automatic Reconciliation   losartan (COZAAR) 100 MG tablet Take 100 mg by mouth daily 11/11/21  Yes Ar Automatic Reconciliation     Allergies   Allergen Reactions    Codeine Nausea And Vomiting     Other reaction(s): Nausea and/or vomiting-Intolerance     Past Medical History:   Diagnosis Date    Acne rosacea     CKD (chronic kidney disease) stage 3, GFR 30-59 ml/min ([facility]) 4/12/2013    Coronary atherosclerosis of native coronary artery 04/12/2013 4/16/13 (Dr. An Diaz) Coronary artery bypass grafting x4. Grafts consisting  of:  1. Left internal mammary artery to left anterior descending. 2. Reverse saphenous vein to the diagonal.  3. Reverse saphenous vein to the obtuse marginal.  4. Reverse saphenous vein graft to the posterior descending artery. Diabetes mellitus, type 2 ([facility] [address]     History of non-ST elevation myocardial infarction (NSTEMI) 04/12/2013    History of unstable angina 09/23/2015    Hyperlipidemia 04/12/2013    Hypertension     Hypothyroid 4/12/2013    Osteoarthritis     PAF (paroxysmal atrial fibrillation) ([facility] [address] 4/22/2013     Past Surgical History:   Procedure Laterality Date    BLEPHAROPLASTY      BUNIONECTOMY      CORONARY ARTERY BYPASS GRAFT  4/16/13    x 4 - Dr. Marvel Vera       Family History   Problem Relation Age of Onset    Heart Disease Mother     Heart Disease Father     Cancer Sister      Social History     Tobacco Use    Smoking status: Never    Smokeless tobacco: Never   Substance Use Topics    Alcohol use:  Yes     Alcohol/week: 1.7 standard drinks       ROS:    Review of Systems   Cardiovascular:  Positive for palpitations. Negative for chest pain. Respiratory:  Negative for shortness of breath. Musculoskeletal:  Positive for arthritis and back pain. PHYSICAL EXAM:   /68   Ht 5' 6.5\" (1.689 m)   Wt 155 lb 9.6 oz (70.6 kg) Comment: with shoes  BMI 24.74 kg/m²      Wt Readings from Last 3 Encounters:   01/30/23 155 lb 9.6 oz (70.6 kg)   01/12/23 157 lb 3.2 oz (71.3 kg)   10/13/22 154 lb 6.4 oz (70 kg)     BP Readings from Last 3 Encounters:   01/30/23 132/68   01/12/23 112/62   08/17/22 (!) 147/66     Pulse Readings from Last 3 Encounters:   01/12/23 76   08/17/22 61   08/04/22 97           Physical Exam  Constitutional:       Appearance: Normal appearance. HENT:      Head: Normocephalic and atraumatic. Eyes:      Conjunctiva/sclera: Conjunctivae normal.   Neck:      Vascular: No carotid bruit. Cardiovascular:      Rate and Rhythm: Normal rate and regular rhythm. Heart sounds: No murmur heard. No friction rub. No gallop. Pulmonary:      Effort: No respiratory distress. Breath sounds: No wheezing or rales. Musculoskeletal:         General: No swelling. Cervical back: Neck supple. Skin:     General: Skin is warm and dry. Neurological:      General: No focal deficit present. Mental Status: She is alert. Psychiatric:         Mood and Affect: Mood normal.         Behavior: Behavior normal.       Medical problems and test results were reviewed with the patient today.      DATA REVIEW    LIPID PANEL     Lab Results   Component Value Date    CHOL 137 01/13/2023    CHOL 128 10/07/2022    CHOL 157 05/27/2021     Lab Results   Component Value Date    TRIG 108 01/13/2023    TRIG 94 10/07/2022    TRIG 146 05/27/2021     Lab Results   Component Value Date    HDL 53 01/13/2023    HDL 48 10/07/2022    HDL 54 05/27/2021     Lab Results   Component Value Date    LDLCALC 62.4 01/13/2023    LDLCALC 61.2 10/07/2022    LDLCALC 78 05/27/2021 Lab Results   Component Value Date    LABVLDL 21.6 01/13/2023    LABVLDL 18.8 10/07/2022    VLDL 25 05/27/2021     Lab Results   Component Value Date    CHOLHDLRATIO 2.6 01/13/2023    CHOLHDLRATIO 2.7 10/07/2022       BMP  Lab Results   Component Value Date/Time     01/13/2023 08:05 AM    K 4.4 01/13/2023 08:05 AM     01/13/2023 08:05 AM    CO2 22 01/13/2023 08:05 AM    BUN 16 01/13/2023 08:05 AM    CREATININE 1.00 01/13/2023 08:05 AM    GLUCOSE 107 01/13/2023 08:05 AM    CALCIUM 9.2 01/13/2023 08:05 AM      Hemoglobin A1C   Date Value Ref Range Status   01/13/2023 7.1 (H) 4.8 - 5.6 % Final       EKG        CXR/IMAGING        DEVICE INTERROGATION        OUTSIDE RECORDS REVIEW    Records from outside providers have been reviewed and summarized as noted in the HPI, past history and data review sections of this note, and reviewed with patient. .       ASSESSMENT and PLAN    Jeana Cooper was seen today for atrial fibrillation, hypertension and coronary artery disease. Diagnoses and all orders for this visit:    Paroxysmal atrial fibrillation ([address]    Type 2 diabetes mellitus with stage 3a chronic kidney disease, without long-term current use of insulin (HCC)    Dyslipidemia    Essential hypertension    Other orders  -     nitroGLYCERIN (NITROSTAT) 0.4 MG SL tablet; Place 1 tablet under the tongue every 5 minutes as needed for Chest pain        IMPRESSION:    No angina, continue meds and lifestyle modification    Lipids at goal, continue meds, she's reduced her statin to see if her joint pain improves. If it doesn't after a month, recommend resuming 40 mg dose. LDL currently is at target. BP at target, continue meds     Rate controlled, anticoagulated, continue meds        Return in about 6 months (around 7/30/2023). Thank you for allowing me to participate in this patient's care. Please call or contact me if there are any questions or concerns regarding the above.       ANNETTA HARPER, MD  01/30/23  9:22 AM

## 2023-01-30 ENCOUNTER — OFFICE VISIT (OUTPATIENT)
Dept: CARDIOLOGY CLINIC | Age: 83
End: 2023-01-30
Payer: MEDICARE

## 2023-01-30 VITALS
DIASTOLIC BLOOD PRESSURE: 68 MMHG | HEIGHT: 67 IN | WEIGHT: 155.6 LBS | SYSTOLIC BLOOD PRESSURE: 132 MMHG | BODY MASS INDEX: 24.42 KG/M2

## 2023-01-30 DIAGNOSIS — I10 ESSENTIAL HYPERTENSION: ICD-10-CM

## 2023-01-30 DIAGNOSIS — N18.31 TYPE 2 DIABETES MELLITUS WITH STAGE 3A CHRONIC KIDNEY DISEASE, WITHOUT LONG-TERM CURRENT USE OF INSULIN (HCC): ICD-10-CM

## 2023-01-30 DIAGNOSIS — E11.22 TYPE 2 DIABETES MELLITUS WITH STAGE 3A CHRONIC KIDNEY DISEASE, WITHOUT LONG-TERM CURRENT USE OF INSULIN (HCC): ICD-10-CM

## 2023-01-30 DIAGNOSIS — I48.0 PAROXYSMAL ATRIAL FIBRILLATION (HCC): Primary | ICD-10-CM

## 2023-01-30 DIAGNOSIS — E78.5 DYSLIPIDEMIA: ICD-10-CM

## 2023-01-30 PROCEDURE — G8400 PT W/DXA NO RESULTS DOC: HCPCS | Performed by: INTERNAL MEDICINE

## 2023-01-30 PROCEDURE — G8484 FLU IMMUNIZE NO ADMIN: HCPCS | Performed by: INTERNAL MEDICINE

## 2023-01-30 PROCEDURE — G8427 DOCREV CUR MEDS BY ELIG CLIN: HCPCS | Performed by: INTERNAL MEDICINE

## 2023-01-30 PROCEDURE — 99214 OFFICE O/P EST MOD 30 MIN: CPT | Performed by: INTERNAL MEDICINE

## 2023-01-30 PROCEDURE — 1123F ACP DISCUSS/DSCN MKR DOCD: CPT | Performed by: INTERNAL MEDICINE

## 2023-01-30 PROCEDURE — 3051F HG A1C>EQUAL 7.0%<8.0%: CPT | Performed by: INTERNAL MEDICINE

## 2023-01-30 PROCEDURE — 1036F TOBACCO NON-USER: CPT | Performed by: INTERNAL MEDICINE

## 2023-01-30 PROCEDURE — 3078F DIAST BP <80 MM HG: CPT | Performed by: INTERNAL MEDICINE

## 2023-01-30 PROCEDURE — G8420 CALC BMI NORM PARAMETERS: HCPCS | Performed by: INTERNAL MEDICINE

## 2023-01-30 PROCEDURE — 1090F PRES/ABSN URINE INCON ASSESS: CPT | Performed by: INTERNAL MEDICINE

## 2023-01-30 PROCEDURE — 3075F SYST BP GE 130 - 139MM HG: CPT | Performed by: INTERNAL MEDICINE

## 2023-01-30 RX ORDER — NITROGLYCERIN 0.4 MG/1
0.4 TABLET SUBLINGUAL EVERY 5 MIN PRN
Qty: 25 TABLET | Refills: 5 | Status: SHIPPED | OUTPATIENT
Start: 2023-01-30

## 2023-01-30 ASSESSMENT — ENCOUNTER SYMPTOMS
BACK PAIN: 1
SHORTNESS OF BREATH: 0

## 2023-01-30 NOTE — PATIENT INSTRUCTIONS
Patient Education        Atrial Fibrillation: Care Instructions  Your Care Instructions     Atrial fibrillation is an irregular and often fast heartbeat. Treating this condition is important for several reasons. It can cause blood clots, which can travel from your heart to your brain and cause a stroke. If you have a fast heartbeat, you may feel lightheaded, dizzy, and weak. An irregular heartbeat can also increase your risk for heart failure. Atrial fibrillation is often the result of another heart condition, such as high blood pressure or coronary artery disease. Making changes to improve your heart condition will help you stay healthy and active. Follow-up care is a key part of your treatment and safety. Be sure to make and go to all appointments, and call your doctor if you are having problems. It's also a good idea to know your test results and keep a list of the medicines you take. How can you care for yourself at home? Medicines    Take your medicines exactly as prescribed. Call your doctor if you think you are having a problem with your medicine. You will get more details on the specific medicines your doctor prescribes. If your doctor has given you a blood thinner to prevent a stroke, be sure you get instructions about how to take your medicine safely. Blood thinners can cause serious bleeding problems. Do not take any vitamins, over-the-counter drugs, or herbal products without talking to your doctor first.   Lifestyle changes    Do not smoke. Smoking can increase your chance of a stroke and heart attack. If you need help quitting, talk to your doctor about stop-smoking programs and medicines. These can increase your chances of quitting for good. Eat a heart-healthy diet. Stay at a healthy weight. Lose weight if you need to. Limit alcohol to 2 drinks a day for men and 1 drink a day for women. Too much alcohol can cause health problems.      Avoid infections such as COVID-19, colds, and the flu. Get the flu vaccine every year. Get a pneumococcal vaccine. If you have had one before, ask your doctor whether you need another dose. Stay up to date on your COVID-19 vaccines. Activity    If your doctor recommends it, get more exercise. Walking is a good choice. Bit by bit, increase the amount you walk every day. Try for at least 30 minutes on most days of the week. You also may want to swim, bike, or do other activities. Your doctor may suggest that you join a cardiac rehabilitation program so that you can have help increasing your physical activity safely. Start light exercise if your doctor says it is okay. Even a small amount will help you get stronger, have more energy, and manage stress. Walking is an easy way to get exercise. Start out by walking a little more than you did in the hospital. Gradually increase the amount you walk. When you exercise, watch for signs that your heart is working too hard. You are pushing too hard if you cannot talk while you are exercising. If you become short of breath or dizzy or have chest pain, sit down and rest immediately. Check your pulse regularly. Place two fingers on the artery at the palm side of your wrist, in line with your thumb. If your heartbeat seems uneven or fast, talk to your doctor. When should you call for help? Call 911 anytime you think you may need emergency care. For example, call if:    You have symptoms of a heart attack. These may include:  Chest pain or pressure, or a strange feeling in the chest.  Sweating. Shortness of breath. Nausea or vomiting. Pain, pressure, or a strange feeling in the back, neck, jaw, or upper belly or in one or both shoulders or arms. Lightheadedness or sudden weakness. A fast or irregular heartbeat. After you call 911, the  may tell you to chew 1 adult-strength or 2 to 4 low-dose aspirin. Wait for an ambulance. Do not try to drive yourself. You have symptoms of a stroke.  These may include:  Sudden numbness, tingling, weakness, or loss of movement in your face, arm, or leg, especially on only one side of your body. Sudden vision changes. Sudden trouble speaking. Sudden confusion or trouble understanding simple statements. Sudden problems with walking or balance. A sudden, severe headache that is different from past headaches. You passed out (lost consciousness). Call your doctor now or seek immediate medical care if:    You have new or increased shortness of breath. You feel dizzy or lightheaded, or you feel like you may faint. Your heart rate becomes irregular. You can feel your heart flutter in your chest or skip heartbeats. Tell your doctor if these symptoms are new or worse. Watch closely for changes in your health, and be sure to contact your doctor if you have any problems. Where can you learn more? Go to http://www.woods.com/ and enter U020 to learn more about \"Atrial Fibrillation: Care Instructions. \"  Current as of: September 7, 2022               Content Version: 13.5  © 2006-2022 Healthwise, Incorporated. Care instructions adapted under license by [facility] ([facility]). If you have questions about a medical condition or this instruction, always ask your healthcare professional. [place] [address] any warranty or liability for your use of this information. Please visit [email or web address]. org for more information regarding cardiovascular disease prevention and treatment.

## 2023-02-10 DIAGNOSIS — E11.22 TYPE 2 DIABETES MELLITUS WITH STAGE 3A CHRONIC KIDNEY DISEASE, WITHOUT LONG-TERM CURRENT USE OF INSULIN (HCC): Primary | ICD-10-CM

## 2023-02-10 DIAGNOSIS — N18.31 TYPE 2 DIABETES MELLITUS WITH STAGE 3A CHRONIC KIDNEY DISEASE, WITHOUT LONG-TERM CURRENT USE OF INSULIN (HCC): Primary | ICD-10-CM

## 2023-02-10 RX ORDER — LANCETS
1 EACH MISCELLANEOUS DAILY
Qty: 100 EACH | Refills: 3 | Status: SHIPPED | OUTPATIENT
Start: 2023-02-10

## 2023-02-10 RX ORDER — BLOOD-GLUCOSE METER
1 KIT MISCELLANEOUS DAILY PRN
Qty: 1 KIT | Refills: 0 | Status: SHIPPED | OUTPATIENT
Start: 2023-02-10

## 2023-02-15 SDOH — ECONOMIC STABILITY: HOUSING INSECURITY
IN THE LAST 12 MONTHS, WAS THERE A TIME WHEN YOU DID NOT HAVE A STEADY PLACE TO SLEEP OR SLEPT IN A SHELTER (INCLUDING NOW)?: NO

## 2023-02-15 SDOH — ECONOMIC STABILITY: TRANSPORTATION INSECURITY
IN THE PAST 12 MONTHS, HAS LACK OF TRANSPORTATION KEPT YOU FROM MEETINGS, WORK, OR FROM GETTING THINGS NEEDED FOR DAILY LIVING?: NO

## 2023-02-15 SDOH — ECONOMIC STABILITY: INCOME INSECURITY: HOW HARD IS IT FOR YOU TO PAY FOR THE VERY BASICS LIKE FOOD, HOUSING, MEDICAL CARE, AND HEATING?: NOT HARD AT ALL

## 2023-02-15 SDOH — ECONOMIC STABILITY: FOOD INSECURITY: WITHIN THE PAST 12 MONTHS, YOU WORRIED THAT YOUR FOOD WOULD RUN OUT BEFORE YOU GOT MONEY TO BUY MORE.: NEVER TRUE

## 2023-02-17 ENCOUNTER — OFFICE VISIT (OUTPATIENT)
Dept: INTERNAL MEDICINE CLINIC | Facility: CLINIC | Age: 83
End: 2023-02-17

## 2023-02-17 VITALS
WEIGHT: 151.2 LBS | HEART RATE: 64 BPM | DIASTOLIC BLOOD PRESSURE: 60 MMHG | SYSTOLIC BLOOD PRESSURE: 130 MMHG | BODY MASS INDEX: 23.73 KG/M2 | HEIGHT: 67 IN

## 2023-02-17 DIAGNOSIS — R10.31 RIGHT INGUINAL PAIN: ICD-10-CM

## 2023-02-17 DIAGNOSIS — R42 DIZZINESS: Primary | ICD-10-CM

## 2023-02-17 RX ORDER — MELOXICAM 7.5 MG/1
7.5 TABLET ORAL DAILY
Qty: 30 TABLET | Refills: 0 | Status: SHIPPED | OUTPATIENT
Start: 2023-02-17

## 2023-02-17 ASSESSMENT — ENCOUNTER SYMPTOMS
SHORTNESS OF BREATH: 0
CONSTIPATION: 0
ABDOMINAL PAIN: 0
COUGH: 0
ABDOMINAL DISTENTION: 0
CHEST TIGHTNESS: 0

## 2023-02-17 ASSESSMENT — PATIENT HEALTH QUESTIONNAIRE - PHQ9
SUM OF ALL RESPONSES TO PHQ QUESTIONS 1-9: 0
1. LITTLE INTEREST OR PLEASURE IN DOING THINGS: 0
SUM OF ALL RESPONSES TO PHQ QUESTIONS 1-9: 0
SUM OF ALL RESPONSES TO PHQ9 QUESTIONS 1 & 2: 0
2. FEELING DOWN, DEPRESSED OR HOPELESS: 0

## 2023-02-17 NOTE — PROGRESS NOTES
Chief Complaint   Patient presents with    Dizziness     Follow up, improved a lot         Héctor Piper is a 79 y.o. female who presents today for Dizziness (Follow up, improved a lot )     She is here concerned about dizziness that is started on February 8, she recalls playing tennis and later having episode of dizziness after changes in position but later her dizziness began to be more frequent, lasting longer  Since then she has been trying to rest, has not been playing tennis, she denies any upper respiratory symptoms, or nasal congestion  She denies any changes in hearing, or visual changes, denies any headaches. Has almost resolved today    She has chronic mild balance issues, but this has not changed. She has been doing some painting at home, and has been experiencing right hip pain. She is requesting a refill for meloxicam, which has tolerated in the past.  She is aware of the side effects and possible interactions with her anticoagulation    Wt Readings from Last 3 Encounters:   02/17/23 151 lb 3.2 oz (68.6 kg)   01/30/23 155 lb 9.6 oz (70.6 kg)   01/12/23 157 lb 3.2 oz (71.3 kg)     Vitals:    02/17/23 1135   BP: 130/60   Site: Right Upper Arm   Position: Sitting   Pulse: 64   Weight: 151 lb 3.2 oz (68.6 kg)   Height: 5' 6.5\" (1.689 m)        Assessment and plan:  1. Dizziness  2. Right inguinal pain  -     meloxicam (MOBIC) 7.5 MG tablet; Take 1 tablet by mouth daily, Disp-30 tablet, R-0Normal  Dizziness could be associated with vertigo, benign positional but we also discussed about other causes of dizziness, including alterations in blood pressure, brain atrophy, inner ear diseases. Because she is currently feeling better, she has been advised to continue conservative management, she was given information about possible exercises, and we discussed briefly about vestibular therapy.   In regards her right groin pain, and hip pain, we are going to try low-dose of Mobic, reinforced the importance of taking only as needed and in low quantities to prevent interactions with her normal anticoagulation. Patient will follow-up as planned in April or before if any worsening      Review of system:    Review of Systems   Constitutional:  Negative for activity change, chills, fatigue and fever. Respiratory:  Negative for cough, chest tightness and shortness of breath. Cardiovascular:  Negative for chest pain. Gastrointestinal:  Negative for abdominal distention, abdominal pain and constipation. Neurological:  Positive for dizziness. Negative for headaches. Psychiatric/Behavioral:  The patient is not nervous/anxious.         Immunization history:    Immunization History   Administered Date(s) Administered    COVID-19, PFIZER GRAY top, DO NOT Dilute, (age 15 y+), IM, 30 mcg/0.3 mL 04/14/2022    COVID-19, PFIZER PURPLE top, DILUTE for use, (age 15 y+), 30mcg/0.3mL 01/22/2021, 02/13/2021, 10/14/2021    INFLUENZA, INTRADERMAL, QUADRIVALENT, PRESERVATIVE FREE 11/12/2015    Influenza Virus Vaccine 11/07/2008, 10/01/2016, 11/08/2017    Influenza, FLUAD, (age 72 y+), Adjuvanted, 0.5mL 09/29/2021, 10/13/2022    Influenza, FLUZONE (age 72 y+), High Dose, 0.7mL 10/27/2020    Influenza, High Dose (Fluzone 65 yrs and older) 11/28/2016, 11/08/2018, 10/02/2019    Influenza, Triv, 3 Years and older, IM (Afluria (5 yrs and older) 10/12/2009, 10/12/2010, 12/02/2011, 11/21/2012    Influenza, Triv, 3 Years and older, IM, PF (Afluria 5yrs and older) 11/07/2013, 11/17/2014    Pneumococcal Conjugate 13-valent ([id number]) 01/09/2018    Pneumococcal Polysaccharide ([id number]) 01/01/1990, 10/01/2014    Tdap (Boostrix, Adacel) 10/02/2019    Zoster Live (Zostavax) 10/01/2015    Zoster Recombinant (Shingrix) 12/04/2020, 03/01/2021       Current medications:      Current Outpatient Medications:     meloxicam (MOBIC) 7.5 MG tablet, Take 1 tablet by mouth daily, Disp: 30 tablet, Rfl: 0    blood glucose test strips (ASCENSIA AUTODISC VI;ONE TOUCH ULTRA TEST VI) strip, 1 each by In Vitro route daily As needed. , Disp: 100 each, Rfl: 3    ONE TOUCH ULTRASOFT LANCETS MISC, 1 each by Does not apply route daily, Disp: 100 each, Rfl: 3    Blood Glucose Monitoring Suppl (ONE TOUCH ULTRA MINI) w/Device KIT, 1 kit by Does not apply route daily as needed (high blood sugars), Disp: 1 kit, Rfl: 0    nitroGLYCERIN (NITROSTAT) 0.4 MG SL tablet, Place 1 tablet under the tongue every 5 minutes as needed for Chest pain, Disp: 25 tablet, Rfl: 5    metoprolol succinate (TOPROL XL) 100 MG extended release tablet, Take 1 tablet by mouth daily, Disp: 90 tablet, Rfl: 1    levothyroxine (SYNTHROID) 50 MCG tablet, Take 1 tablet by mouth Daily TAKE 1 TABLET BY MOUTH EVERY DAY BEFORE BREAKFAST, Disp: 90 tablet, Rfl: 1    apixaban (ELIQUIS) 5 MG TABS tablet, Take 1 tablet by mouth in the morning and 1 tablet before bedtime. , Disp: 180 tablet, Rfl: 3    atorvastatin (LIPITOR) 40 MG tablet, Take 40 mg by mouth daily, Disp: , Rfl:     cyclobenzaprine (FLEXERIL) 5 MG tablet, Take 5 mg by mouth daily as needed, Disp: , Rfl:     furosemide (LASIX) 20 MG tablet, Take 20 mg by mouth daily as needed, Disp: , Rfl:     losartan (COZAAR) 100 MG tablet, Take 100 mg by mouth daily, Disp: , Rfl:     metFORMIN (GLUCOPHAGE-XR) 500 MG extended release tablet, Take 1 tablet by mouth in the morning and at bedtime (Patient taking differently: Take 500 mg by mouth daily), Disp: 180 tablet, Rfl: 1      Family history:    Family History   Problem Relation Age of Onset    Heart Disease Mother     Heart Disease Father     Cancer Sister         Past medical history:    Past Medical History:   Diagnosis Date    Acne rosacea     CKD (chronic kidney disease) stage 3, GFR 30-59 ml/min ([facility] [address] 4/12/2013    Coronary atherosclerosis of native coronary artery 04/12/2013 4/16/13 (Dr. Shira Mortensen) Coronary artery bypass grafting x4. Grafts consisting  of:  1.  Left internal mammary artery to left anterior descending. 2. Reverse saphenous vein to the diagonal.  3. Reverse saphenous vein to the obtuse marginal.  4. Reverse saphenous vein graft to the posterior descending artery. Diabetes mellitus, type 2 ([facility] [address]     History of non-ST elevation myocardial infarction (NSTEMI) 04/12/2013    History of unstable angina 09/23/2015    Hyperlipidemia 04/12/2013    Hypertension     Hypothyroid 4/12/2013    Osteoarthritis     PAF (paroxysmal atrial fibrillation) ([facility] [address] 4/22/2013        Past Surgical History:   Procedure Laterality Date    BLEPHAROPLASTY      BUNIONECTOMY      CORONARY ARTERY BYPASS GRAFT  4/16/13    x 4 - Dr. Danna Sylvester          Physical exam:    /60 (Site: Right Upper Arm, Position: Sitting)   Pulse 64   Ht 5' 6.5\" (1.689 m)   Wt 151 lb 3.2 oz (68.6 kg)   BMI 24.04 kg/m²     Physical Exam  Vitals reviewed. Constitutional:       Appearance: Normal appearance. HENT:      Right Ear: Tympanic membrane normal.      Left Ear: Tympanic membrane normal.   Eyes:      Extraocular Movements: Extraocular movements intact. Pupils: Pupils are equal, round, and reactive to light. Cardiovascular:      Rate and Rhythm: Normal rate. Pulmonary:      Effort: Pulmonary effort is normal.   Neurological:      General: No focal deficit present. Mental Status: She is alert and oriented to person, place, and time.       Gait: Gait normal.   Psychiatric:         Mood and Affect: Mood normal.        Recent labs:    Hemoglobin A1C   Date Value Ref Range Status   01/13/2023 7.1 (H) 4.8 - 5.6 % Final        Lab Results   Component Value Date    CHOL 137 01/13/2023    CHOL 128 10/07/2022    CHOL 157 05/27/2021     Lab Results   Component Value Date    TRIG 108 01/13/2023    TRIG 94 10/07/2022    TRIG 146 05/27/2021     Lab Results   Component Value Date    HDL 53 01/13/2023    HDL 48 10/07/2022    HDL 54 05/27/2021     Lab Results   Component Value Date    LDLCALC 62.4 01/13/2023 LDLCALC 61.2 10/07/2022    LDLCALC 78 05/27/2021     Lab Results   Component Value Date    LABVLDL 21.6 01/13/2023    LABVLDL 18.8 10/07/2022    VLDL 25 05/27/2021     Lab Results   Component Value Date    CHOLHDLRATIO 2.6 01/13/2023    CHOLHDLRATIO 2.7 10/07/2022       Lab Results   Component Value Date    TSH 3.120 12/21/2021    ZIK9QDF 2.030 10/07/2022       Lab Results   Component Value Date     01/13/2023    K 4.4 01/13/2023     (H) 01/13/2023    CO2 22 01/13/2023    BUN 16 01/13/2023    CREATININE 1.00 01/13/2023    GLUCOSE 107 (H) 01/13/2023    CALCIUM 9.2 01/13/2023    PROT 6.6 01/13/2023    LABALBU 3.6 01/13/2023    BILITOT 1.0 01/13/2023    ALKPHOS 107 01/13/2023    AST 16 01/13/2023    ALT 17 01/13/2023    LABGLOM 56 (L) 01/13/2023    GFRAA 56 (L) 12/21/2021    AGRATIO 1.7 12/21/2021    GLOB 3.0 01/13/2023       Lab Results   Component Value Date    WBC 6.4 01/13/2023    HGB 14.5 01/13/2023    HCT 45.4 01/13/2023    MCV 93.0 01/13/2023     01/13/2023             This document was generated with the aid of voice recognition software. . Please be aware that there may be inadvertent transcription errors not identified and corrected by the [employer]

## 2023-02-22 ENCOUNTER — APPOINTMENT (RX ONLY)
Dept: URBAN - METROPOLITAN AREA CLINIC 24 | Facility: CLINIC | Age: 83
Setting detail: DERMATOLOGY
End: 2023-02-22

## 2023-02-22 DIAGNOSIS — L81.4 OTHER MELANIN HYPERPIGMENTATION: ICD-10-CM

## 2023-02-22 DIAGNOSIS — L82.1 OTHER SEBORRHEIC KERATOSIS: ICD-10-CM

## 2023-02-22 DIAGNOSIS — Z85.828 PERSONAL HISTORY OF OTHER MALIGNANT NEOPLASM OF SKIN: ICD-10-CM

## 2023-02-22 DIAGNOSIS — L90.5 SCAR CONDITIONS AND FIBROSIS OF SKIN: ICD-10-CM

## 2023-02-22 DIAGNOSIS — Z71.89 OTHER SPECIFIED COUNSELING: ICD-10-CM

## 2023-02-22 DIAGNOSIS — L57.0 ACTINIC KERATOSIS: ICD-10-CM

## 2023-02-22 DIAGNOSIS — L57.8 OTHER SKIN CHANGES DUE TO CHRONIC EXPOSURE TO NONIONIZING RADIATION: ICD-10-CM

## 2023-02-22 DIAGNOSIS — D18.0 HEMANGIOMA: ICD-10-CM

## 2023-02-22 PROBLEM — D18.01 HEMANGIOMA OF SKIN AND SUBCUTANEOUS TISSUE: Status: ACTIVE | Noted: 2023-02-22

## 2023-02-22 PROCEDURE — ? PRESCRIPTION MEDICATION MANAGEMENT

## 2023-02-22 PROCEDURE — ? LIQUID NITROGEN

## 2023-02-22 PROCEDURE — ? PRESCRIPTION

## 2023-02-22 PROCEDURE — 99204 OFFICE O/P NEW MOD 45 MIN: CPT | Mod: 25

## 2023-02-22 PROCEDURE — ? OTHER

## 2023-02-22 PROCEDURE — ? COUNSELING

## 2023-02-22 PROCEDURE — 17004 DESTROY PREMAL LESIONS 15/>: CPT

## 2023-02-22 RX ORDER — FLUOROURACIL 2 G/40G
CREAM TOPICAL BID
Qty: 40 | Refills: 1 | Status: ERX | COMMUNITY
Start: 2023-02-22

## 2023-02-22 RX ADMIN — FLUOROURACIL: 2 CREAM TOPICAL at 00:00

## 2023-02-22 ASSESSMENT — LOCATION DETAILED DESCRIPTION DERM
LOCATION DETAILED: RIGHT PROXIMAL DORSAL MIDDLE FINGER
LOCATION DETAILED: 3RD WEB SPACE LEFT HAND
LOCATION DETAILED: RIGHT SUPERIOR MEDIAL MIDBACK
LOCATION DETAILED: RIGHT POSTERIOR ANKLE
LOCATION DETAILED: MID TRAPEZIAL NECK
LOCATION DETAILED: LEFT DISTAL POSTERIOR UPPER ARM
LOCATION DETAILED: RIGHT POSTERIOR SHOULDER
LOCATION DETAILED: RIGHT ULNAR DORSAL HAND
LOCATION DETAILED: LEFT DORSAL THUMB METACARPOPHALANGEAL JOINT
LOCATION DETAILED: LEFT CENTRAL ZYGOMA
LOCATION DETAILED: RIGHT RADIAL DORSAL HAND
LOCATION DETAILED: LEFT PROXIMAL DORSAL MIDDLE FINGER
LOCATION DETAILED: RIGHT MEDIAL MALAR CHEEK
LOCATION DETAILED: LEFT SUPERIOR UPPER BACK
LOCATION DETAILED: LEFT DISTAL DORSAL FOREARM
LOCATION DETAILED: LEFT RADIAL DORSAL HAND
LOCATION DETAILED: RIGHT DORSAL THUMB METACARPOPHALANGEAL JOINT
LOCATION DETAILED: MIDDLE STERNUM
LOCATION DETAILED: LEFT POSTERIOR SHOULDER
LOCATION DETAILED: RIGHT PROXIMAL DORSAL THUMB
LOCATION DETAILED: NASAL DORSUM
LOCATION DETAILED: RIGHT PROXIMAL PRETIBIAL REGION
LOCATION DETAILED: SUPERIOR LUMBAR SPINE
LOCATION DETAILED: LEFT LATERAL DORSAL WRIST
LOCATION DETAILED: RIGHT DISTAL RADIAL DORSAL FOREARM
LOCATION DETAILED: RIGHT PROXIMAL RADIAL DORSAL INDEX FINGER
LOCATION DETAILED: EPIGASTRIC SKIN

## 2023-02-22 ASSESSMENT — LOCATION SIMPLE DESCRIPTION DERM
LOCATION SIMPLE: RIGHT THUMB
LOCATION SIMPLE: RIGHT CHEEK
LOCATION SIMPLE: LEFT ZYGOMA
LOCATION SIMPLE: LEFT HAND
LOCATION SIMPLE: NOSE
LOCATION SIMPLE: LEFT UPPER BACK
LOCATION SIMPLE: LEFT POSTERIOR UPPER ARM
LOCATION SIMPLE: LEFT FOREARM
LOCATION SIMPLE: CHEST
LOCATION SIMPLE: RIGHT LOWER BACK
LOCATION SIMPLE: RIGHT PRETIBIAL REGION
LOCATION SIMPLE: LOWER BACK
LOCATION SIMPLE: ABDOMEN
LOCATION SIMPLE: RIGHT HAND
LOCATION SIMPLE: RIGHT ANKLE
LOCATION SIMPLE: RIGHT MIDDLE FINGER
LOCATION SIMPLE: LEFT SHOULDER
LOCATION SIMPLE: RIGHT FOREARM
LOCATION SIMPLE: RIGHT SHOULDER
LOCATION SIMPLE: RIGHT INDEX FINGER
LOCATION SIMPLE: LEFT WRIST
LOCATION SIMPLE: TRAPEZIAL NECK
LOCATION SIMPLE: LEFT MIDDLE FINGER

## 2023-02-22 ASSESSMENT — LOCATION ZONE DERM
LOCATION ZONE: LEG
LOCATION ZONE: FINGER
LOCATION ZONE: NOSE
LOCATION ZONE: HAND
LOCATION ZONE: TRUNK
LOCATION ZONE: NECK
LOCATION ZONE: FACE
LOCATION ZONE: ARM

## 2023-02-22 NOTE — PROCEDURE: PRESCRIPTION MEDICATION MANAGEMENT
Render In Strict Bullet Format?: No
Initiate Treatment: Efudex bid x2 weeks then Vaseline, treat 1 area at a time
Detail Level: Zone

## 2023-02-22 NOTE — PROCEDURE: OTHER
Note Text (......Xxx Chief Complaint.): This diagnosis correlates with the
Render Risk Assessment In Note?: no
Detail Level: Zone
Other (Free Text): Pt reported distant hx of SCC.  She does not have pathology

## 2023-03-14 RX ORDER — LEVOTHYROXINE SODIUM 0.05 MG/1
TABLET ORAL
Qty: 90 TABLET | Refills: 1 | Status: SHIPPED | OUTPATIENT
Start: 2023-03-14

## 2023-03-14 NOTE — TELEPHONE ENCOUNTER
Requested Prescriptions     Pending Prescriptions Disp Refills    levothyroxine (SYNTHROID) 50 MCG tablet [Pharmacy Med Name: LEVOTHYROXINE 50 MCG TABLET] 90 tablet 1     Sig: TAKE 1 TABLET BY MOUTH EVERY DAY BEFORE BREAKFAST     Dose verified and to [facility]. Patient is scheduled for follow up visit.

## 2023-03-21 DIAGNOSIS — N18.31 TYPE 2 DIABETES MELLITUS WITH STAGE 3A CHRONIC KIDNEY DISEASE, WITHOUT LONG-TERM CURRENT USE OF INSULIN (HCC): ICD-10-CM

## 2023-03-21 DIAGNOSIS — E11.22 TYPE 2 DIABETES MELLITUS WITH STAGE 3A CHRONIC KIDNEY DISEASE, WITHOUT LONG-TERM CURRENT USE OF INSULIN (HCC): ICD-10-CM

## 2023-03-21 NOTE — TELEPHONE ENCOUNTER
Dr. Shelton Arevalo,     I hope you are doing well. Please order Metformin HCL  mg for me at [facility] at [address]. The phone number is [phone]. The prescription was cancelled when we decided to try Jardiance. But. Jason Burroughs Jason Burroughs Arielle Soledad was extremely expensive, so I decided to go back to Metformin. I went to renew the prescription today and was told it was cancelled. Thank you for doing this.   Yaneth Houser        Requested Prescriptions     Pending Prescriptions Disp Refills    metFORMIN (GLUCOPHAGE-XR) 500 MG extended release tablet 90 tablet 0     Sig: Take 1 tablet by mouth daily

## 2023-03-22 RX ORDER — METFORMIN HYDROCHLORIDE 500 MG/1
500 TABLET, EXTENDED RELEASE ORAL DAILY
Qty: 90 TABLET | Refills: 0 | Status: SHIPPED | OUTPATIENT
Start: 2023-03-22

## 2023-04-07 DIAGNOSIS — N18.31 TYPE 2 DIABETES MELLITUS WITH STAGE 3A CHRONIC KIDNEY DISEASE, WITHOUT LONG-TERM CURRENT USE OF INSULIN (HCC): ICD-10-CM

## 2023-04-07 DIAGNOSIS — E11.22 TYPE 2 DIABETES MELLITUS WITH STAGE 3A CHRONIC KIDNEY DISEASE, WITHOUT LONG-TERM CURRENT USE OF INSULIN (HCC): ICD-10-CM

## 2023-04-07 LAB
ALBUMIN SERPL-MCNC: 3.9 G/DL (ref 3.2–4.6)
ALBUMIN/GLOB SERPL: 1.4 (ref 0.4–1.6)
ALP SERPL-CCNC: 105 U/L (ref 50–136)
ALT SERPL-CCNC: 20 U/L (ref 12–65)
ANION GAP SERPL CALC-SCNC: 7 MMOL/L (ref 2–11)
AST SERPL-CCNC: 25 U/L (ref 15–37)
BILIRUB SERPL-MCNC: 0.6 MG/DL (ref 0.2–1.1)
BUN SERPL-MCNC: 18 MG/DL (ref 8–23)
CALCIUM SERPL-MCNC: 9.3 MG/DL (ref 8.3–10.4)
CHLORIDE SERPL-SCNC: 112 MMOL/L (ref 101–110)
CO2 SERPL-SCNC: 24 MMOL/L (ref 21–32)
CREAT SERPL-MCNC: 0.9 MG/DL (ref 0.6–1)
EST. AVERAGE GLUCOSE BLD GHB EST-MCNC: 126 MG/DL
GLOBULIN SER CALC-MCNC: 2.7 G/DL (ref 2.8–4.5)
GLUCOSE SERPL-MCNC: 93 MG/DL (ref 65–100)
HBA1C MFR BLD: 6 % (ref 4.8–5.6)
POTASSIUM SERPL-SCNC: 4.4 MMOL/L (ref 3.5–5.1)
PROT SERPL-MCNC: 6.6 G/DL (ref 6.3–8.2)
SODIUM SERPL-SCNC: 143 MMOL/L (ref 133–143)

## 2023-04-13 PROBLEM — Z12.39 BREAST CANCER SCREENING: Status: RESOLVED | Noted: 2021-06-03 | Resolved: 2023-04-13

## 2023-04-13 PROBLEM — Z23 ENCOUNTER FOR IMMUNIZATION: Status: RESOLVED | Noted: 2021-03-03 | Resolved: 2023-04-13

## 2023-04-13 PROBLEM — Z12.11 COLON CANCER SCREENING: Status: RESOLVED | Noted: 2021-03-03 | Resolved: 2023-04-13

## 2023-04-20 ENCOUNTER — HOSPITAL ENCOUNTER (OUTPATIENT)
Dept: ULTRASOUND IMAGING | Age: 83
Discharge: HOME | End: 2023-04-22
Payer: MEDICARE

## 2023-04-20 DIAGNOSIS — R10.2 PELVIC PAIN: ICD-10-CM

## 2023-04-20 DIAGNOSIS — R10.31 RIGHT GROIN PAIN: ICD-10-CM

## 2023-04-20 PROCEDURE — 76856 US EXAM PELVIC COMPLETE: CPT

## 2023-04-30 ASSESSMENT — SOCIAL DETERMINANTS OF HEALTH (SDOH)
WITHIN THE LAST YEAR, HAVE YOU BEEN KICKED, HIT, SLAPPED, OR OTHERWISE PHYSICALLY HURT BY YOUR PARTNER OR EX-PARTNER?: NO
WITHIN THE LAST YEAR, HAVE YOU BEEN AFRAID OF YOUR PARTNER OR EX-PARTNER?: NO
WITHIN THE LAST YEAR, HAVE TO BEEN RAPED OR FORCED TO HAVE ANY KIND OF SEXUAL ACTIVITY BY YOUR PARTNER OR EX-PARTNER?: NO
WITHIN THE LAST YEAR, HAVE YOU BEEN HUMILIATED OR EMOTIONALLY ABUSED IN OTHER WAYS BY YOUR PARTNER OR EX-PARTNER?: NO

## 2023-05-03 ENCOUNTER — OFFICE VISIT (OUTPATIENT)
Dept: OBGYN CLINIC | Age: 83
End: 2023-05-03
Payer: MEDICARE

## 2023-05-03 VITALS
DIASTOLIC BLOOD PRESSURE: 70 MMHG | WEIGHT: 157 LBS | HEIGHT: 66 IN | SYSTOLIC BLOOD PRESSURE: 122 MMHG | BODY MASS INDEX: 25.23 KG/M2

## 2023-05-03 DIAGNOSIS — N95.0 PMB (POSTMENOPAUSAL BLEEDING): Primary | ICD-10-CM

## 2023-05-03 DIAGNOSIS — Z12.4 SCREENING FOR CERVICAL CANCER: ICD-10-CM

## 2023-05-03 PROCEDURE — 1123F ACP DISCUSS/DSCN MKR DOCD: CPT | Performed by: OBSTETRICS & GYNECOLOGY

## 2023-05-03 PROCEDURE — 3078F DIAST BP <80 MM HG: CPT | Performed by: OBSTETRICS & GYNECOLOGY

## 2023-05-03 PROCEDURE — 99203 OFFICE O/P NEW LOW 30 MIN: CPT | Performed by: OBSTETRICS & GYNECOLOGY

## 2023-05-03 PROCEDURE — 1036F TOBACCO NON-USER: CPT | Performed by: OBSTETRICS & GYNECOLOGY

## 2023-05-03 PROCEDURE — G8417 CALC BMI ABV UP PARAM F/U: HCPCS | Performed by: OBSTETRICS & GYNECOLOGY

## 2023-05-03 PROCEDURE — 3074F SYST BP LT 130 MM HG: CPT | Performed by: OBSTETRICS & GYNECOLOGY

## 2023-05-03 PROCEDURE — 1090F PRES/ABSN URINE INCON ASSESS: CPT | Performed by: OBSTETRICS & GYNECOLOGY

## 2023-05-03 PROCEDURE — G8427 DOCREV CUR MEDS BY ELIG CLIN: HCPCS | Performed by: OBSTETRICS & GYNECOLOGY

## 2023-05-03 PROCEDURE — G8400 PT W/DXA NO RESULTS DOC: HCPCS | Performed by: OBSTETRICS & GYNECOLOGY

## 2023-05-03 ASSESSMENT — PATIENT HEALTH QUESTIONNAIRE - PHQ9
SUM OF ALL RESPONSES TO PHQ QUESTIONS 1-9: 0
2. FEELING DOWN, DEPRESSED OR HOPELESS: 0
SUM OF ALL RESPONSES TO PHQ QUESTIONS 1-9: 0
1. LITTLE INTEREST OR PLEASURE IN DOING THINGS: 0
SUM OF ALL RESPONSES TO PHQ QUESTIONS 1-9: 0
SUM OF ALL RESPONSES TO PHQ QUESTIONS 1-9: 0
SUM OF ALL RESPONSES TO PHQ9 QUESTIONS 1 & 2: 0

## 2023-05-05 ENCOUNTER — HOSPITAL ENCOUNTER (OUTPATIENT)
Dept: MAMMOGRAPHY | Age: 83
End: 2023-05-05
Payer: MEDICARE

## 2023-05-05 ENCOUNTER — PATIENT MESSAGE (OUTPATIENT)
Dept: CARDIOLOGY CLINIC | Age: 83
End: 2023-05-05

## 2023-05-05 DIAGNOSIS — Z12.31 ENCOUNTER FOR SCREENING MAMMOGRAM FOR MALIGNANT NEOPLASM OF BREAST: ICD-10-CM

## 2023-05-05 PROCEDURE — 77063 BREAST TOMOSYNTHESIS BI: CPT

## 2023-05-05 RX ORDER — LOSARTAN POTASSIUM 100 MG/1
100 TABLET ORAL DAILY
Qty: 90 TABLET | Refills: 3 | Status: SHIPPED | OUTPATIENT
Start: 2023-05-05

## 2023-05-05 NOTE — TELEPHONE ENCOUNTER
From: Tung Grant  To: Dr. Murillo Drillin2023 11:32 AM EDT  Subject: Losartan    Dr. Pepe Tan,    I am in need of my prescription Losartan to be filled. I thank you for doing this for me. [facility] [address] is my pharmacy.     Manuel Reno

## 2023-05-07 ENCOUNTER — PATIENT MESSAGE (OUTPATIENT)
Dept: OBGYN CLINIC | Age: 83
End: 2023-05-07

## 2023-05-09 NOTE — TELEPHONE ENCOUNTER
Kaiden Hankins, please let Mrs Reinaldo Hercules know the surgery scheduling team is working on it. I'd like her to f/u w/ her cardiologist, Dr. Radha Mike prior to surgery for pe- op clearance given her medical hx. Past Medical History:   Diagnosis Date    Acne rosacea     CKD (chronic kidney disease) stage 3, GFR 30-59 ml/min ([facility]) 4/12/2013    Coronary atherosclerosis of native coronary artery 04/12/2013 4/16/13 (Dr. Tabatha Blake) Coronary artery bypass grafting x4. Grafts consisting  of:  1. Left internal mammary artery to left anterior descending. 2. Reverse saphenous vein to the diagonal.  3. Reverse saphenous vein to the obtuse marginal.  4. Reverse saphenous vein graft to the posterior descending artery. Diabetes mellitus, type 2 ([facility] [address]     History of non-ST elevation myocardial infarction (NSTEMI) 04/12/2013    History of unstable angina 09/23/2015    Hyperlipidemia 04/12/2013    Hypertension     Hypothyroid 4/12/2013    Osteoarthritis     PAF (paroxysmal atrial fibrillation) ([facility] [address] 4/22/2013     I'll check w/ Rise Amado and see what we can do to move this along.    Thanks

## 2023-05-09 NOTE — PROGRESS NOTES
Name: Gwendolyn Camacho     : 1940  Insurance: Address:   Home:   Work:    Today:  5/3/2023    Physicians Information    Recommended Surgery: Fractional D & C Hysteroscopy w/ Myosure         Place:     When: coordinate w/ pt   Diagnosis:   Diagnosis Orders   1. PMB (postmenopausal bleeding)        2.  Screening for cervical cancer  PAP IG, Aptima HPV and rfx 16/18,45 ([id number])        Time Needed:  1 h  Adele Blanco MD  Assistant Needed: none  Special Request:  she sees Dr. Bhavesh Ramsey, needs pre op cardiac clearance     Surgery Department Information    Date Scheduled: _____________________ Hospital Pre-Op: ______________________        Time Scheduled : ____________________ Surgery Entered: _____________________    Facility: ____________________________ Patient Notified: ______________________    Pre-Op: _______________________ Orders Completed: ___________________    Tri Roldan MD, Edra Eddy

## 2023-05-10 LAB
CYTOLOGIST CVX/VAG CYTO: NORMAL
CYTOLOGY CVX/VAG DOC THIN PREP: NORMAL
HPV APTIMA: NEGATIVE
HPV GENOTYPE REFLEX: NORMAL
Lab: NORMAL
PATH REPORT.FINAL DX SPEC: NORMAL
STAT OF ADQ CVX/VAG CYTO-IMP: NORMAL

## 2023-05-23 NOTE — PROGRESS NOTES
04/07/2023 07:43 AM     04/07/2023 07:43 AM    CO2 24 04/07/2023 07:43 AM    BUN 18 04/07/2023 07:43 AM    CREATININE 0.90 04/07/2023 07:43 AM    GLUCOSE 93 04/07/2023 07:43 AM    CALCIUM 9.3 04/07/2023 07:43 AM          EKG    Sinus  Rhythm 62  normal axis, intervals, ST and T waves    CXR/IMAGING        DEVICE INTERROGATION        OUTSIDE RECORDS REVIEW    Records from outside providers have been reviewed and summarized as noted in the HPI, past history and data review sections of this note, and reviewed with patient. .       ASSESSMENT and PLAN    Ramonita Dorman was seen today for atrial fibrillation. Diagnoses and all orders for this visit:    Pre-op evaluation    Paroxysmal atrial fibrillation ([address]  -     EKG 12 Lead    Type 2 diabetes mellitus with stage 3a chronic kidney disease, without long-term current use of insulin (HCC)    Dyslipidemia    Essential hypertension    Atherosclerosis of native coronary artery of native heart with stable angina pectoris (HCC)  -     EKG 12 Lead    Other orders  -     furosemide (LASIX) 20 MG tablet; Take 1 tablet by mouth daily as needed (as needed)          IMPRESSION:    Lipids at goal, continue meds    No angina, continue meds and lifestyle modification     Rate controlled, anticoagulated, continue meds    Low perioperative cardiovascular risk for upcoming surgery, and low risk to hold Eliquis 48-72 hours prior and post.         Return in about 6 months (around 11/24/2023). Thank you for allowing me to participate in this patient's care. Please call or contact me if there are any questions or concerns regarding the above.       Bianka Weaver MD  05/24/23  3:20 PM

## 2023-05-24 ENCOUNTER — OFFICE VISIT (OUTPATIENT)
Age: 83
End: 2023-05-24
Payer: MEDICARE

## 2023-05-24 VITALS
SYSTOLIC BLOOD PRESSURE: 118 MMHG | DIASTOLIC BLOOD PRESSURE: 70 MMHG | HEIGHT: 66 IN | BODY MASS INDEX: 25.17 KG/M2 | WEIGHT: 156.6 LBS | HEART RATE: 62 BPM

## 2023-05-24 DIAGNOSIS — E11.22 TYPE 2 DIABETES MELLITUS WITH STAGE 3A CHRONIC KIDNEY DISEASE, WITHOUT LONG-TERM CURRENT USE OF INSULIN (HCC): ICD-10-CM

## 2023-05-24 DIAGNOSIS — E78.5 DYSLIPIDEMIA: ICD-10-CM

## 2023-05-24 DIAGNOSIS — Z01.818 PRE-OP EVALUATION: Primary | ICD-10-CM

## 2023-05-24 DIAGNOSIS — N18.31 TYPE 2 DIABETES MELLITUS WITH STAGE 3A CHRONIC KIDNEY DISEASE, WITHOUT LONG-TERM CURRENT USE OF INSULIN (HCC): ICD-10-CM

## 2023-05-24 DIAGNOSIS — I25.118 ATHEROSCLEROSIS OF NATIVE CORONARY ARTERY OF NATIVE HEART WITH STABLE ANGINA PECTORIS (HCC): ICD-10-CM

## 2023-05-24 DIAGNOSIS — I10 ESSENTIAL HYPERTENSION: ICD-10-CM

## 2023-05-24 DIAGNOSIS — I48.0 PAROXYSMAL ATRIAL FIBRILLATION (HCC): ICD-10-CM

## 2023-05-24 PROCEDURE — 3074F SYST BP LT 130 MM HG: CPT | Performed by: INTERNAL MEDICINE

## 2023-05-24 PROCEDURE — G8427 DOCREV CUR MEDS BY ELIG CLIN: HCPCS | Performed by: INTERNAL MEDICINE

## 2023-05-24 PROCEDURE — 3078F DIAST BP <80 MM HG: CPT | Performed by: INTERNAL MEDICINE

## 2023-05-24 PROCEDURE — 3044F HG A1C LEVEL LT 7.0%: CPT | Performed by: INTERNAL MEDICINE

## 2023-05-24 PROCEDURE — G8400 PT W/DXA NO RESULTS DOC: HCPCS | Performed by: INTERNAL MEDICINE

## 2023-05-24 PROCEDURE — G8417 CALC BMI ABV UP PARAM F/U: HCPCS | Performed by: INTERNAL MEDICINE

## 2023-05-24 PROCEDURE — 93000 ELECTROCARDIOGRAM COMPLETE: CPT | Performed by: INTERNAL MEDICINE

## 2023-05-24 PROCEDURE — 1090F PRES/ABSN URINE INCON ASSESS: CPT | Performed by: INTERNAL MEDICINE

## 2023-05-24 PROCEDURE — 1036F TOBACCO NON-USER: CPT | Performed by: INTERNAL MEDICINE

## 2023-05-24 PROCEDURE — 99214 OFFICE O/P EST MOD 30 MIN: CPT | Performed by: INTERNAL MEDICINE

## 2023-05-24 PROCEDURE — 1123F ACP DISCUSS/DSCN MKR DOCD: CPT | Performed by: INTERNAL MEDICINE

## 2023-05-24 RX ORDER — FUROSEMIDE 20 MG/1
20 TABLET ORAL DAILY PRN
Qty: 30 TABLET | Refills: 5 | Status: SHIPPED | OUTPATIENT
Start: 2023-05-24

## 2023-05-24 ASSESSMENT — ENCOUNTER SYMPTOMS: SHORTNESS OF BREATH: 0

## 2023-05-25 ENCOUNTER — PREP FOR PROCEDURE (OUTPATIENT)
Dept: OBGYN CLINIC | Age: 83
End: 2023-05-25

## 2023-05-25 PROBLEM — N95.0 POSTMENOPAUSAL BLEEDING: Status: ACTIVE | Noted: 2023-05-25

## 2023-06-09 DIAGNOSIS — E11.22 TYPE 2 DIABETES MELLITUS WITH STAGE 3A CHRONIC KIDNEY DISEASE, WITHOUT LONG-TERM CURRENT USE OF INSULIN (HCC): ICD-10-CM

## 2023-06-09 DIAGNOSIS — N18.31 TYPE 2 DIABETES MELLITUS WITH STAGE 3A CHRONIC KIDNEY DISEASE, WITHOUT LONG-TERM CURRENT USE OF INSULIN (HCC): ICD-10-CM

## 2023-06-09 RX ORDER — METFORMIN HYDROCHLORIDE 500 MG/1
TABLET, EXTENDED RELEASE ORAL
Qty: 90 TABLET | Refills: 0 | Status: SHIPPED | OUTPATIENT
Start: 2023-06-09

## 2023-06-22 ENCOUNTER — OFFICE VISIT (OUTPATIENT)
Dept: OBGYN CLINIC | Age: 83
End: 2023-06-22
Payer: MEDICARE

## 2023-06-22 VITALS
SYSTOLIC BLOOD PRESSURE: 118 MMHG | BODY MASS INDEX: 24.91 KG/M2 | DIASTOLIC BLOOD PRESSURE: 70 MMHG | HEIGHT: 66 IN | WEIGHT: 155 LBS

## 2023-06-22 DIAGNOSIS — Z01.818 PRE-OP EXAM: ICD-10-CM

## 2023-06-22 DIAGNOSIS — N88.2 CERVICAL STENOSIS (UTERINE CERVIX): ICD-10-CM

## 2023-06-22 DIAGNOSIS — N18.30 STAGE 3 CHRONIC KIDNEY DISEASE, UNSPECIFIED WHETHER STAGE 3A OR 3B CKD (HCC): ICD-10-CM

## 2023-06-22 DIAGNOSIS — Z01.818 PRE-OP TESTING: ICD-10-CM

## 2023-06-22 DIAGNOSIS — R82.90 ABNORMAL FINDING ON URINALYSIS: ICD-10-CM

## 2023-06-22 DIAGNOSIS — N95.0 PMB (POSTMENOPAUSAL BLEEDING): Primary | ICD-10-CM

## 2023-06-22 DIAGNOSIS — G89.18 POST-OP PAIN: ICD-10-CM

## 2023-06-22 LAB
ERYTHROCYTE [DISTWIDTH] IN BLOOD BY AUTOMATED COUNT: 14.8 % (ref 11.9–14.6)
HCT VFR BLD AUTO: 46 % (ref 35.8–46.3)
HGB BLD-MCNC: 15.1 G/DL (ref 11.7–15.4)
MCH RBC QN AUTO: 30.4 PG (ref 26.1–32.9)
MCHC RBC AUTO-ENTMCNC: 32.8 G/DL (ref 31.4–35)
MCV RBC AUTO: 92.7 FL (ref 82–102)
NRBC # BLD: 0 K/UL (ref 0–0.2)
PLATELET # BLD AUTO: 257 K/UL (ref 150–450)
PMV BLD AUTO: 10.7 FL (ref 9.4–12.3)
RBC # BLD AUTO: 4.96 M/UL (ref 4.05–5.2)
WBC # BLD AUTO: 8.8 K/UL (ref 4.3–11.1)

## 2023-06-22 PROCEDURE — 81003 URINALYSIS AUTO W/O SCOPE: CPT | Performed by: OBSTETRICS & GYNECOLOGY

## 2023-06-23 LAB
ANION GAP SERPL CALC-SCNC: 9 MMOL/L (ref 2–11)
BILIRUBIN, URINE, POC: NEGATIVE
BLOOD URINE, POC: NEGATIVE
BUN SERPL-MCNC: 17 MG/DL (ref 8–23)
CALCIUM SERPL-MCNC: 10 MG/DL (ref 8.3–10.4)
CHLORIDE SERPL-SCNC: 107 MMOL/L (ref 101–110)
CO2 SERPL-SCNC: 23 MMOL/L (ref 21–32)
CREAT SERPL-MCNC: 1.2 MG/DL (ref 0.6–1)
GLUCOSE SERPL-MCNC: 128 MG/DL (ref 65–100)
GLUCOSE URINE, POC: NEGATIVE
KETONES, URINE, POC: NEGATIVE
LEUKOCYTE ESTERASE, URINE, POC: ABNORMAL
NITRITE, URINE, POC: POSITIVE
PH, URINE, POC: 7.5 (ref 4.6–8)
POTASSIUM SERPL-SCNC: 4 MMOL/L (ref 3.5–5.1)
PROTEIN,URINE, POC: NEGATIVE
SODIUM SERPL-SCNC: 139 MMOL/L (ref 133–143)
SPECIFIC GRAVITY, URINE, POC: 1.01 (ref 1–1.03)
URINALYSIS CLARITY, POC: ABNORMAL
URINALYSIS COLOR, POC: YELLOW
UROBILINOGEN, POC: NORMAL

## 2023-06-23 RX ORDER — SULFAMETHOXAZOLE AND TRIMETHOPRIM 800; 160 MG/1; MG/1
1 TABLET ORAL 2 TIMES DAILY
Qty: 14 TABLET | Refills: 0 | Status: SHIPPED | OUTPATIENT
Start: 2023-06-23 | End: 2023-06-30

## 2023-06-25 LAB
BACTERIA SPEC CULT: ABNORMAL
SERVICE CMNT-IMP: ABNORMAL

## 2023-06-27 RX ORDER — MISOPROSTOL 200 UG/1
TABLET ORAL
Qty: 3 TABLET | Refills: 0 | Status: ON HOLD | OUTPATIENT
Start: 2023-06-27 | End: 2023-06-30 | Stop reason: HOSPADM

## 2023-06-27 RX ORDER — OXYCODONE HYDROCHLORIDE 5 MG/1
5 TABLET ORAL EVERY 6 HOURS PRN
Qty: 12 TABLET | Refills: 0 | Status: SHIPPED | OUTPATIENT
Start: 2023-06-27 | End: 2023-06-30

## 2023-06-28 ENCOUNTER — APPOINTMENT (RX ONLY)
Dept: URBAN - METROPOLITAN AREA CLINIC 24 | Facility: CLINIC | Age: 83
Setting detail: DERMATOLOGY
End: 2023-06-28

## 2023-06-28 DIAGNOSIS — L57.0 ACTINIC KERATOSIS: ICD-10-CM

## 2023-06-28 DIAGNOSIS — D18.0 HEMANGIOMA: ICD-10-CM

## 2023-06-28 DIAGNOSIS — Z71.89 OTHER SPECIFIED COUNSELING: ICD-10-CM

## 2023-06-28 DIAGNOSIS — L82.1 OTHER SEBORRHEIC KERATOSIS: ICD-10-CM

## 2023-06-28 DIAGNOSIS — Z85.828 PERSONAL HISTORY OF OTHER MALIGNANT NEOPLASM OF SKIN: ICD-10-CM

## 2023-06-28 DIAGNOSIS — L57.8 OTHER SKIN CHANGES DUE TO CHRONIC EXPOSURE TO NONIONIZING RADIATION: ICD-10-CM

## 2023-06-28 DIAGNOSIS — L81.4 OTHER MELANIN HYPERPIGMENTATION: ICD-10-CM

## 2023-06-28 PROBLEM — D18.01 HEMANGIOMA OF SKIN AND SUBCUTANEOUS TISSUE: Status: ACTIVE | Noted: 2023-06-28

## 2023-06-28 PROCEDURE — 99213 OFFICE O/P EST LOW 20 MIN: CPT | Mod: 25

## 2023-06-28 PROCEDURE — ? COUNSELING

## 2023-06-28 PROCEDURE — 17003 DESTRUCT PREMALG LES 2-14: CPT

## 2023-06-28 PROCEDURE — 17000 DESTRUCT PREMALG LESION: CPT

## 2023-06-28 PROCEDURE — ? OTHER

## 2023-06-28 PROCEDURE — ? LIQUID NITROGEN

## 2023-06-28 ASSESSMENT — LOCATION SIMPLE DESCRIPTION DERM
LOCATION SIMPLE: RIGHT FOREARM
LOCATION SIMPLE: LEFT CHEEK
LOCATION SIMPLE: LEFT FOREARM
LOCATION SIMPLE: LEFT SHOULDER
LOCATION SIMPLE: CHEST
LOCATION SIMPLE: LOWER BACK
LOCATION SIMPLE: ABDOMEN
LOCATION SIMPLE: RIGHT CHEEK
LOCATION SIMPLE: TRAPEZIAL NECK
LOCATION SIMPLE: RIGHT LOWER BACK
LOCATION SIMPLE: RIGHT HAND
LOCATION SIMPLE: LEFT UPPER BACK
LOCATION SIMPLE: RIGHT SHOULDER

## 2023-06-28 ASSESSMENT — LOCATION DETAILED DESCRIPTION DERM
LOCATION DETAILED: RIGHT INFERIOR CENTRAL MALAR CHEEK
LOCATION DETAILED: RIGHT RADIAL DORSAL HAND
LOCATION DETAILED: LEFT POSTERIOR SHOULDER
LOCATION DETAILED: RIGHT POSTERIOR SHOULDER
LOCATION DETAILED: LEFT DISTAL DORSAL FOREARM
LOCATION DETAILED: LEFT INFERIOR CENTRAL MALAR CHEEK
LOCATION DETAILED: MIDDLE STERNUM
LOCATION DETAILED: RIGHT SUPERIOR MEDIAL MIDBACK
LOCATION DETAILED: LEFT SUPERIOR UPPER BACK
LOCATION DETAILED: RIGHT DISTAL RADIAL DORSAL FOREARM
LOCATION DETAILED: SUPERIOR LUMBAR SPINE
LOCATION DETAILED: EPIGASTRIC SKIN
LOCATION DETAILED: RIGHT ULNAR DORSAL HAND
LOCATION DETAILED: MID TRAPEZIAL NECK

## 2023-06-28 ASSESSMENT — LOCATION ZONE DERM
LOCATION ZONE: HAND
LOCATION ZONE: TRUNK
LOCATION ZONE: NECK
LOCATION ZONE: ARM
LOCATION ZONE: FACE

## 2023-06-28 NOTE — PROCEDURE: LIQUID NITROGEN
Show Applicator Variable?: Yes
Render Post-Care Instructions In Note?: no
Number Of Freeze-Thaw Cycles: 2 freeze-thaw cycles
Post-Care Instructions: I reviewed with the patient in detail post-care instructions. Patient is to wear sunprotection, and avoid picking at any of the treated lesions. Pt may apply Vaseline to crusted or scabbing areas.
Duration Of Freeze Thaw-Cycle (Seconds): 0
Detail Level: Detailed
Consent: The patient's consent was obtained including but not limited to risks of crusting, scabbing, blistering, scarring, darker or lighter pigmentary change, recurrence, incomplete removal and infection.

## 2023-06-29 ENCOUNTER — ANESTHESIA EVENT (OUTPATIENT)
Dept: SURGERY | Age: 83
End: 2023-06-29
Payer: MEDICARE

## 2023-06-29 RX ORDER — ONDANSETRON 2 MG/ML
4 INJECTION INTRAMUSCULAR; INTRAVENOUS EVERY 6 HOURS PRN
Status: CANCELLED | OUTPATIENT
Start: 2023-06-30

## 2023-06-30 ENCOUNTER — ANESTHESIA (OUTPATIENT)
Dept: SURGERY | Age: 83
End: 2023-06-30
Payer: MEDICARE

## 2023-06-30 ENCOUNTER — HOSPITAL ENCOUNTER (OUTPATIENT)
Age: 83
Discharge: HOME | End: 2023-06-30
Attending: OBSTETRICS & GYNECOLOGY | Admitting: OBSTETRICS & GYNECOLOGY
Payer: MEDICARE

## 2023-06-30 VITALS
SYSTOLIC BLOOD PRESSURE: 149 MMHG | BODY MASS INDEX: 24.12 KG/M2 | HEIGHT: 67 IN | HEART RATE: 62 BPM | WEIGHT: 153.7 LBS | OXYGEN SATURATION: 92 % | DIASTOLIC BLOOD PRESSURE: 64 MMHG | RESPIRATION RATE: 14 BRPM | TEMPERATURE: 98.4 F

## 2023-06-30 DIAGNOSIS — N95.0 POSTMENOPAUSAL BLEEDING: ICD-10-CM

## 2023-06-30 LAB
ABO + RH BLD: NORMAL
BLOOD GROUP ANTIBODIES SERPL: NORMAL
GLUCOSE BLD STRIP.AUTO-MCNC: 107 MG/DL (ref 65–100)
SERVICE CMNT-IMP: ABNORMAL
SPECIMEN EXP DATE BLD: NORMAL

## 2023-06-30 PROCEDURE — 3700000000 HC ANESTHESIA ATTENDED CARE: Performed by: OBSTETRICS & GYNECOLOGY

## 2023-06-30 PROCEDURE — 3700000001 HC ADD 15 MINUTES (ANESTHESIA): Performed by: OBSTETRICS & GYNECOLOGY

## 2023-06-30 PROCEDURE — 7100000010 HC PHASE II RECOVERY - FIRST 15 MIN: Performed by: OBSTETRICS & GYNECOLOGY

## 2023-06-30 PROCEDURE — 2500000003 HC RX 250 WO HCPCS: Performed by: STUDENT IN AN ORGANIZED HEALTH CARE EDUCATION/TRAINING PROGRAM

## 2023-06-30 PROCEDURE — 6360000002 HC RX W HCPCS: Performed by: STUDENT IN AN ORGANIZED HEALTH CARE EDUCATION/TRAINING PROGRAM

## 2023-06-30 PROCEDURE — 82962 GLUCOSE BLOOD TEST: CPT

## 2023-06-30 PROCEDURE — 7100000011 HC PHASE II RECOVERY - ADDTL 15 MIN: Performed by: OBSTETRICS & GYNECOLOGY

## 2023-06-30 PROCEDURE — 86850 RBC ANTIBODY SCREEN: CPT

## 2023-06-30 PROCEDURE — 6370000000 HC RX 637 (ALT 250 FOR IP): Performed by: ANESTHESIOLOGY

## 2023-06-30 PROCEDURE — 7100000000 HC PACU RECOVERY - FIRST 15 MIN: Performed by: OBSTETRICS & GYNECOLOGY

## 2023-06-30 PROCEDURE — 3600000003 HC SURGERY LEVEL 3 BASE: Performed by: OBSTETRICS & GYNECOLOGY

## 2023-06-30 PROCEDURE — 2709999900 HC NON-CHARGEABLE SUPPLY: Performed by: OBSTETRICS & GYNECOLOGY

## 2023-06-30 PROCEDURE — 88305 TISSUE EXAM BY PATHOLOGIST: CPT

## 2023-06-30 PROCEDURE — 86901 BLOOD TYPING SEROLOGIC RH(D): CPT

## 2023-06-30 PROCEDURE — 86900 BLOOD TYPING SEROLOGIC ABO: CPT

## 2023-06-30 PROCEDURE — 6360000002 HC RX W HCPCS: Performed by: ANESTHESIOLOGY

## 2023-06-30 PROCEDURE — 7100000001 HC PACU RECOVERY - ADDTL 15 MIN: Performed by: OBSTETRICS & GYNECOLOGY

## 2023-06-30 PROCEDURE — 3600000013 HC SURGERY LEVEL 3 ADDTL 15MIN: Performed by: OBSTETRICS & GYNECOLOGY

## 2023-06-30 PROCEDURE — 2580000003 HC RX 258: Performed by: ANESTHESIOLOGY

## 2023-06-30 RX ORDER — DIPHENHYDRAMINE HYDROCHLORIDE 50 MG/ML
12.5 INJECTION INTRAMUSCULAR; INTRAVENOUS
Status: DISCONTINUED | OUTPATIENT
Start: 2023-06-30 | End: 2023-06-30 | Stop reason: HOSPADM

## 2023-06-30 RX ORDER — SODIUM CHLORIDE 0.9 % (FLUSH) 0.9 %
5-40 SYRINGE (ML) INJECTION PRN
Status: DISCONTINUED | OUTPATIENT
Start: 2023-06-30 | End: 2023-06-30

## 2023-06-30 RX ORDER — SODIUM CHLORIDE 0.9 % (FLUSH) 0.9 %
5-40 SYRINGE (ML) INJECTION EVERY 12 HOURS SCHEDULED
Status: DISCONTINUED | OUTPATIENT
Start: 2023-06-30 | End: 2023-06-30

## 2023-06-30 RX ORDER — ONDANSETRON 2 MG/ML
INJECTION INTRAMUSCULAR; INTRAVENOUS PRN
Status: DISCONTINUED | OUTPATIENT
Start: 2023-06-30 | End: 2023-06-30 | Stop reason: SDUPTHER

## 2023-06-30 RX ORDER — FENTANYL CITRATE 50 UG/ML
100 INJECTION, SOLUTION INTRAMUSCULAR; INTRAVENOUS
Status: DISCONTINUED | OUTPATIENT
Start: 2023-06-30 | End: 2023-06-30 | Stop reason: HOSPADM

## 2023-06-30 RX ORDER — SODIUM CHLORIDE 9 MG/ML
INJECTION, SOLUTION INTRAVENOUS PRN
Status: DISCONTINUED | OUTPATIENT
Start: 2023-06-30 | End: 2023-06-30 | Stop reason: HOSPADM

## 2023-06-30 RX ORDER — SODIUM CHLORIDE 0.9 % (FLUSH) 0.9 %
5-40 SYRINGE (ML) INJECTION EVERY 12 HOURS SCHEDULED
Status: DISCONTINUED | OUTPATIENT
Start: 2023-06-30 | End: 2023-06-30 | Stop reason: HOSPADM

## 2023-06-30 RX ORDER — DEXAMETHASONE SODIUM PHOSPHATE 10 MG/ML
INJECTION INTRAMUSCULAR; INTRAVENOUS PRN
Status: DISCONTINUED | OUTPATIENT
Start: 2023-06-30 | End: 2023-06-30 | Stop reason: SDUPTHER

## 2023-06-30 RX ORDER — MIDAZOLAM HYDROCHLORIDE 2 MG/2ML
2 INJECTION, SOLUTION INTRAMUSCULAR; INTRAVENOUS
Status: COMPLETED | OUTPATIENT
Start: 2023-06-30 | End: 2023-06-30

## 2023-06-30 RX ORDER — ACETAMINOPHEN 500 MG
1000 TABLET ORAL ONCE
Status: COMPLETED | OUTPATIENT
Start: 2023-06-30 | End: 2023-06-30

## 2023-06-30 RX ORDER — FENTANYL CITRATE 50 UG/ML
INJECTION, SOLUTION INTRAMUSCULAR; INTRAVENOUS PRN
Status: DISCONTINUED | OUTPATIENT
Start: 2023-06-30 | End: 2023-06-30 | Stop reason: SDUPTHER

## 2023-06-30 RX ORDER — EPHEDRINE SULFATE/0.9% NACL/PF 50 MG/5 ML
SYRINGE (ML) INTRAVENOUS PRN
Status: DISCONTINUED | OUTPATIENT
Start: 2023-06-30 | End: 2023-06-30 | Stop reason: SDUPTHER

## 2023-06-30 RX ORDER — ONDANSETRON 2 MG/ML
4 INJECTION INTRAMUSCULAR; INTRAVENOUS
Status: DISCONTINUED | OUTPATIENT
Start: 2023-06-30 | End: 2023-06-30 | Stop reason: HOSPADM

## 2023-06-30 RX ORDER — OXYCODONE HYDROCHLORIDE 5 MG/1
5 TABLET ORAL
Status: DISCONTINUED | OUTPATIENT
Start: 2023-06-30 | End: 2023-06-30 | Stop reason: HOSPADM

## 2023-06-30 RX ORDER — SODIUM CHLORIDE 0.9 % (FLUSH) 0.9 %
5-40 SYRINGE (ML) INJECTION PRN
Status: DISCONTINUED | OUTPATIENT
Start: 2023-06-30 | End: 2023-06-30 | Stop reason: HOSPADM

## 2023-06-30 RX ORDER — SODIUM CHLORIDE, SODIUM LACTATE, POTASSIUM CHLORIDE, CALCIUM CHLORIDE 600; 310; 30; 20 MG/100ML; MG/100ML; MG/100ML; MG/100ML
INJECTION, SOLUTION INTRAVENOUS CONTINUOUS
Status: DISCONTINUED | OUTPATIENT
Start: 2023-06-30 | End: 2023-06-30 | Stop reason: HOSPADM

## 2023-06-30 RX ORDER — PROPOFOL 10 MG/ML
INJECTION, EMULSION INTRAVENOUS PRN
Status: DISCONTINUED | OUTPATIENT
Start: 2023-06-30 | End: 2023-06-30 | Stop reason: SDUPTHER

## 2023-06-30 RX ORDER — LIDOCAINE HYDROCHLORIDE 20 MG/ML
INJECTION, SOLUTION EPIDURAL; INFILTRATION; INTRACAUDAL; PERINEURAL PRN
Status: DISCONTINUED | OUTPATIENT
Start: 2023-06-30 | End: 2023-06-30 | Stop reason: SDUPTHER

## 2023-06-30 RX ADMIN — PHENYLEPHRINE HYDROCHLORIDE 100 MCG: 0.1 INJECTION, SOLUTION INTRAVENOUS at 10:57

## 2023-06-30 RX ADMIN — PHENYLEPHRINE HYDROCHLORIDE 150 MCG: 0.1 INJECTION, SOLUTION INTRAVENOUS at 11:05

## 2023-06-30 RX ADMIN — MIDAZOLAM 1 MG: 1 INJECTION INTRAMUSCULAR; INTRAVENOUS at 10:24

## 2023-06-30 RX ADMIN — LIDOCAINE HYDROCHLORIDE 60 MG: 20 INJECTION, SOLUTION EPIDURAL; INFILTRATION; INTRACAUDAL; PERINEURAL at 10:42

## 2023-06-30 RX ADMIN — DEXAMETHASONE SODIUM PHOSPHATE 10 MG: 10 INJECTION INTRAMUSCULAR; INTRAVENOUS at 10:58

## 2023-06-30 RX ADMIN — Medication 20 MG: at 11:02

## 2023-06-30 RX ADMIN — SODIUM CHLORIDE, SODIUM LACTATE, POTASSIUM CHLORIDE, AND CALCIUM CHLORIDE: 600; 310; 30; 20 INJECTION, SOLUTION INTRAVENOUS at 10:30

## 2023-06-30 RX ADMIN — ONDANSETRON 4 MG: 2 INJECTION INTRAMUSCULAR; INTRAVENOUS at 10:58

## 2023-06-30 RX ADMIN — Medication 15 MG: at 10:57

## 2023-06-30 RX ADMIN — FENTANYL CITRATE 100 MCG: 50 INJECTION, SOLUTION INTRAMUSCULAR; INTRAVENOUS at 10:42

## 2023-06-30 RX ADMIN — PROPOFOL 200 MG: 10 INJECTION, EMULSION INTRAVENOUS at 10:42

## 2023-06-30 RX ADMIN — ACETAMINOPHEN 1000 MG: 500 TABLET, FILM COATED ORAL at 09:39

## 2023-06-30 RX ADMIN — Medication 15 MG: at 10:52

## 2023-06-30 ASSESSMENT — PAIN SCALES - GENERAL
PAINLEVEL_OUTOF10: 3
PAINLEVEL_OUTOF10: 0
PAINLEVEL_OUTOF10: 0

## 2023-07-01 ENCOUNTER — PATIENT MESSAGE (OUTPATIENT)
Dept: OBGYN CLINIC | Age: 83
End: 2023-07-01

## 2023-07-03 DIAGNOSIS — C55 ENDOMETRIOID ADENOCARCINOMA OF UTERUS (HCC): Primary | ICD-10-CM

## 2023-07-04 NOTE — TELEPHONE ENCOUNTER
Nash Farr RN 7/3/2023 12:59 PM EDT      ----- Message -----  From: Juany Augustin  Sent: 7/1/2023 3:52 PM EDT  To: [facility] [address] [facility] Clinical Staff  Subject: Homero Villa,    I want to thank you for helping me yesterday with the D & C. I am curious as to your comments regarding the procedure and results. I love my nicolle and was blessed to be referred to you. I will recommend you to anyone I hear is in need of an excellent gyn. Absolutely no issues after the procedure. It is hard to keep a Grace Rene down!     Malik Boyd

## 2023-07-04 NOTE — TELEPHONE ENCOUNTER
7/3/2023:  Telephone call: discussed pathology report w/ pt. Answered questions. Refer to Dr. Harris Escalera. Pt states she is doing well post op D & C. No concerns/complaints. Thanked pt for her kind words. Encouraged Yousif Ours to call the office for additional questions/ concerns.

## 2023-07-06 NOTE — PROGRESS NOTES
Date: 7/7/2023        Patient Name: Juanis Finn     YOB: 1940          Chief Complaint     Chief Complaint   Patient presents with    New Patient      Endometrial cancer, grade 1, bx June 30 2023    History of Present Illness   Juanis Finn is a 79 y.o. who presents today for scheduled visit    Pt with pmb since jan 2023, eventually sought eval, bx as above, us with thickened stripe    Hx cabg, no issues with activity level    Past Medical History     Past Medical History:   Diagnosis Date    Acne rosacea     Cancer ([address])     skin cancer    CKD (chronic kidney disease) stage 3, GFR 30-59 ml/min ([address]) 4/12/2013    Coronary atherosclerosis of native coronary artery 04/12/2013 4/16/13 (Dr. Kain Brennan) Coronary artery bypass grafting x4. Grafts consisting  of:  1. Left internal mammary artery to left anterior descending. 2. Reverse saphenous vein to the diagonal.  3. Reverse saphenous vein to the obtuse marginal.  4. Reverse saphenous vein graft to the posterior descending artery. Diabetes mellitus, type 2 ([address])     History of non-ST elevation myocardial infarction (NSTEMI) 04/12/2013    History of unstable angina 09/23/2015    Hyperlipidemia 04/12/2013    Hypertension     Hypothyroid 4/12/2013    Osteoarthritis     PAF (paroxysmal atrial fibrillation) ([address]) 4/22/2013        Past Surgical History     Past Surgical History:   Procedure Laterality Date    BLEPHAROPLASTY      BUNIONECTOMY      CORONARY ARTERY BYPASS GRAFT  4/16/13    x 4 - Dr. Italia Sherman 6/30/2023    Laveta Maffucci performed by Catherine Reyes MD at [address] [facility]      L2-L3 fusion  ( flora w/ screws), Neurosurgeon:  Dr. Selena Parker.  Author MD Jennifer Ferris, [place]        Medications      Current Outpatient Medications:     apixaban (ELIQUIS) 5 MG TABS tablet, Take 1 tablet by mouth 2 times daily Resume Eliquis Sunday July 2,

## 2023-07-07 ENCOUNTER — OFFICE VISIT (OUTPATIENT)
Dept: ONCOLOGY | Age: 83
End: 2023-07-07
Payer: MEDICARE

## 2023-07-07 VITALS
HEIGHT: 65 IN | HEART RATE: 65 BPM | WEIGHT: 152.8 LBS | TEMPERATURE: 98.1 F | OXYGEN SATURATION: 97 % | DIASTOLIC BLOOD PRESSURE: 75 MMHG | RESPIRATION RATE: 14 BRPM | BODY MASS INDEX: 25.46 KG/M2 | SYSTOLIC BLOOD PRESSURE: 138 MMHG

## 2023-07-07 DIAGNOSIS — C54.1 ENDOMETRIAL ADENOCARCINOMA (HCC): Primary | ICD-10-CM

## 2023-07-07 PROCEDURE — G8400 PT W/DXA NO RESULTS DOC: HCPCS | Performed by: OBSTETRICS & GYNECOLOGY

## 2023-07-07 PROCEDURE — 3078F DIAST BP <80 MM HG: CPT | Performed by: OBSTETRICS & GYNECOLOGY

## 2023-07-07 PROCEDURE — G8427 DOCREV CUR MEDS BY ELIG CLIN: HCPCS | Performed by: OBSTETRICS & GYNECOLOGY

## 2023-07-07 PROCEDURE — 3075F SYST BP GE 130 - 139MM HG: CPT | Performed by: OBSTETRICS & GYNECOLOGY

## 2023-07-07 PROCEDURE — 1123F ACP DISCUSS/DSCN MKR DOCD: CPT | Performed by: OBSTETRICS & GYNECOLOGY

## 2023-07-07 PROCEDURE — 99204 OFFICE O/P NEW MOD 45 MIN: CPT | Performed by: OBSTETRICS & GYNECOLOGY

## 2023-07-07 PROCEDURE — G8417 CALC BMI ABV UP PARAM F/U: HCPCS | Performed by: OBSTETRICS & GYNECOLOGY

## 2023-07-07 PROCEDURE — 1090F PRES/ABSN URINE INCON ASSESS: CPT | Performed by: OBSTETRICS & GYNECOLOGY

## 2023-07-07 PROCEDURE — 1036F TOBACCO NON-USER: CPT | Performed by: OBSTETRICS & GYNECOLOGY

## 2023-07-07 ASSESSMENT — PATIENT HEALTH QUESTIONNAIRE - PHQ9
1. LITTLE INTEREST OR PLEASURE IN DOING THINGS: 0
SUM OF ALL RESPONSES TO PHQ9 QUESTIONS 1 & 2: 0
SUM OF ALL RESPONSES TO PHQ QUESTIONS 1-9: 0
2. FEELING DOWN, DEPRESSED OR HOPELESS: 0
SUM OF ALL RESPONSES TO PHQ QUESTIONS 1-9: 0

## 2023-07-07 ASSESSMENT — ENCOUNTER SYMPTOMS
RESPIRATORY NEGATIVE: 1
ABDOMINAL PAIN: 1

## 2023-07-12 ENCOUNTER — HOSPITAL ENCOUNTER (OUTPATIENT)
Dept: PET IMAGING | Age: 83
Discharge: HOME | End: 2023-07-15
Payer: MEDICARE

## 2023-07-12 DIAGNOSIS — C54.1 ENDOMETRIAL ADENOCARCINOMA (HCC): ICD-10-CM

## 2023-07-12 LAB
GLUCOSE BLD STRIP.AUTO-MCNC: 123 MG/DL (ref 65–100)
SERVICE CMNT-IMP: ABNORMAL

## 2023-07-12 PROCEDURE — 2580000003 HC RX 258: Performed by: OBSTETRICS & GYNECOLOGY

## 2023-07-12 PROCEDURE — 6360000004 HC RX CONTRAST MEDICATION: Performed by: OBSTETRICS & GYNECOLOGY

## 2023-07-12 PROCEDURE — 3430000000 HC RX DIAGNOSTIC RADIOPHARMACEUTICAL: Performed by: OBSTETRICS & GYNECOLOGY

## 2023-07-12 PROCEDURE — 78815 PET IMAGE W/CT SKULL-THIGH: CPT

## 2023-07-12 PROCEDURE — A9552 F18 FDG: HCPCS | Performed by: OBSTETRICS & GYNECOLOGY

## 2023-07-12 PROCEDURE — 82962 GLUCOSE BLOOD TEST: CPT

## 2023-07-12 RX ORDER — SODIUM CHLORIDE 0.9 % (FLUSH) 0.9 %
20 SYRINGE (ML) INJECTION AS NEEDED
Status: DISCONTINUED | OUTPATIENT
Start: 2023-07-12 | End: 2023-07-16 | Stop reason: HOSPADM

## 2023-07-12 RX ORDER — FLUDEOXYGLUCOSE F 18 200 MCI/ML
12.53 INJECTION, SOLUTION INTRAVENOUS
Status: COMPLETED | OUTPATIENT
Start: 2023-07-12 | End: 2023-07-12

## 2023-07-12 RX ADMIN — FLUDEOXYGLUCOSE F 18 12.53 MILLICURIE: 200 INJECTION, SOLUTION INTRAVENOUS at 13:57

## 2023-07-12 RX ADMIN — SODIUM CHLORIDE, PRESERVATIVE FREE 20 ML: 5 INJECTION INTRAVENOUS at 13:57

## 2023-07-12 RX ADMIN — DIATRIZOATE MEGLUMINE AND DIATRIZOATE SODIUM 10 ML: 660; 100 LIQUID ORAL; RECTAL at 13:57

## 2023-07-13 ENCOUNTER — OFFICE VISIT (OUTPATIENT)
Dept: OBGYN CLINIC | Age: 83
End: 2023-07-13
Payer: MEDICARE

## 2023-07-13 VITALS
DIASTOLIC BLOOD PRESSURE: 70 MMHG | SYSTOLIC BLOOD PRESSURE: 110 MMHG | WEIGHT: 154 LBS | HEIGHT: 65 IN | BODY MASS INDEX: 25.66 KG/M2

## 2023-07-13 DIAGNOSIS — Z09 POSTOP CHECK: Primary | ICD-10-CM

## 2023-07-13 PROCEDURE — 1036F TOBACCO NON-USER: CPT | Performed by: OBSTETRICS & GYNECOLOGY

## 2023-07-13 PROCEDURE — G8400 PT W/DXA NO RESULTS DOC: HCPCS | Performed by: OBSTETRICS & GYNECOLOGY

## 2023-07-13 PROCEDURE — 1123F ACP DISCUSS/DSCN MKR DOCD: CPT | Performed by: OBSTETRICS & GYNECOLOGY

## 2023-07-13 PROCEDURE — 3078F DIAST BP <80 MM HG: CPT | Performed by: OBSTETRICS & GYNECOLOGY

## 2023-07-13 PROCEDURE — 3074F SYST BP LT 130 MM HG: CPT | Performed by: OBSTETRICS & GYNECOLOGY

## 2023-07-13 PROCEDURE — 1090F PRES/ABSN URINE INCON ASSESS: CPT | Performed by: OBSTETRICS & GYNECOLOGY

## 2023-07-13 PROCEDURE — 99213 OFFICE O/P EST LOW 20 MIN: CPT | Performed by: OBSTETRICS & GYNECOLOGY

## 2023-07-13 PROCEDURE — G8417 CALC BMI ABV UP PARAM F/U: HCPCS | Performed by: OBSTETRICS & GYNECOLOGY

## 2023-07-13 PROCEDURE — G8427 DOCREV CUR MEDS BY ELIG CLIN: HCPCS | Performed by: OBSTETRICS & GYNECOLOGY

## 2023-07-14 NOTE — PROGRESS NOTES
Post op visit    Chelsea Nelson   79 y.o.  1940  [id number]    Today:  23     Reports:  Is 2 weeks postop, doing well. Denies fever. No:  nausea or vomiting. Is eating, drinking & voiding without difficulty. Bowel function is normal  Is not taking pain medicine. Continues to have constant right groin pain. Surgery Date:  2023    Pre op dx :    PMB  Procedure:   Fractional D & C Hysteroscopy with Myosure   Pathology:     DIAGNOSIS        A:  \"ENDOMETRIAL CURETTINGS\":  ENDOMETRIOID ADENOCARCINOMA, FIGO   GRADE 1.        B:  \"ENDOCERVICAL CURETTINGS\":  FRAGMENTS OF ENDOMETRIAL TISSUE WITH   AT LEAST COMPLEX ATYPICAL HYPERPLASIA. OB History          13    Para   13    Term   13            AB        Living             SAB        IAB        Ectopic        Molar        Multiple        Live Births                  No LMP recorded. Patient is postmenopausal.  Past Surgical History:   Procedure Laterality Date    BLEPHAROPLASTY      BUNIONECTOMY      CORONARY ARTERY BYPASS GRAFT  4/16/13    x 4 - Dr. Nhi Yun 2023    Ferdie Rho performed by Fortino Hirsch MD at [facility]      L2-L3 fusion  ( flora w/ screws), Neurosurgeon:  Dr. Pily Pimentel. Agata New MD  [facility], [place]     Past Medical History:   Diagnosis Date    Acne rosacea     Cancer ([address])     skin cancer    CKD (chronic kidney disease) stage 3, GFR 30-59 ml/min ([address]) 2013    Coronary atherosclerosis of native coronary artery 2013 (Dr. Jareth Lucas) Coronary artery bypass grafting x4. Grafts consisting  of:  1. Left internal mammary artery to left anterior descending. 2. Reverse saphenous vein to the diagonal.  3. Reverse saphenous vein to the obtuse marginal.  4. Reverse saphenous vein graft to the posterior descending artery.       Diabetes mellitus, type 2 ([address])     Heart disease     History of

## 2023-07-18 ENCOUNTER — TELEPHONE (OUTPATIENT)
Dept: ONCOLOGY | Age: 83
End: 2023-07-18

## 2023-07-18 NOTE — TELEPHONE ENCOUNTER
Physician provider:Edward  Reason for today's call: Sx sched  Last office visit: n/a    Patient notified that their information will be routed to the [facility] clinical triage team for review. Patient is advised that they will receive a phone call from the triage department. If symptoms worsen before receiving a call back, the patient has been advised to proceed to the nearest ED. Pt called asking about her Sx date and time.

## 2023-07-19 ENCOUNTER — TELEPHONE (OUTPATIENT)
Dept: CARDIOLOGY CLINIC | Age: 83
End: 2023-07-19

## 2023-07-19 NOTE — TELEPHONE ENCOUNTER
----- Message from [facility] [place] sent at 7/19/2023 11:04 AM EDT -----  Regarding: FW: cardiac clearance    ----- Message -----  From: Carlos Good RN  Sent: 7/19/2023  11:02 AM EDT  To: Linnette Flores MA  Subject: FW: cardiac clearance                              ----- Message -----  From: Laure Dominguez  Sent: 7/19/2023  10:14 AM EDT  To: [facility] T[facility]  Subject: cardiac clearance                                  Patient is going to be scheduled with Dr. Jack Doe for a robotic assist hysterectomy in 2-4 wks. Needing cardiac clearance on patient and blood thinners. Looks like patient was seen in May and was cleared for a previous surgery. Please let me know if patient will need a cardiac clearance appointment or can the doctor just clear her? Thank you - Hope R    Patient is low cardiovascular risk for surgery and low risk to hold Eliquis 48-72 hours prior.  Thank you

## 2023-07-21 ENCOUNTER — PATIENT MESSAGE (OUTPATIENT)
Age: 83
End: 2023-07-21

## 2023-07-21 NOTE — TELEPHONE ENCOUNTER
I spoke with the patient and notified her it appears this was authorized on 06/30/2023 by Fortino Hirsch MD. Patient will call the pharmacy and follow up with us if need be.

## 2023-07-21 NOTE — TELEPHONE ENCOUNTER
From: Willow Godwin  To: Dr. Reynaldo Macias: 7/21/2023 10:31 AM EDT  Subject: Nimesh Contreras,    I hope you are having an enjoyable summer especially since your daughter is home. My Eliquis prescription needs a refill. I would appreciate it if you would do that for me. BLAINE Da Silva - [phone]    Thank you.     Demario Goyal

## 2023-07-21 NOTE — TELEPHONE ENCOUNTER
From: Sonu Nelson  To: Dr. Steve Thomas: 2023 3:55 PM EDT  Subject: Domonique Rehman,    A gentleman called me regarding the Eliquis refill. He said Dr. Ananya Aguillon refilled the Eliquis prescription for a year. I do not understand that as I do not recall even discussing Eliquis with her other than not taking it before the D&C. I certainly did not ask her for a refill. I stopped at [facility] and yes it was called in by her. I cancelled that prescription and I hope you will refill it for me. I much prefer to have my prescription of Eliquis under your watch. Thanks again.     Kirsty Goes

## 2023-07-26 ENCOUNTER — PREP FOR PROCEDURE (OUTPATIENT)
Dept: ONCOLOGY | Age: 83
End: 2023-07-26

## 2023-07-26 PROBLEM — C54.1 ADENOCARCINOMA OF THE ENDOMETRIUM/UTERUS (HCC): Status: ACTIVE | Noted: 2023-07-26

## 2023-08-02 ENCOUNTER — HOSPITAL ENCOUNTER (OUTPATIENT)
Dept: SURGERY | Age: 83
Discharge: HOME | End: 2023-08-05
Payer: MEDICARE

## 2023-08-02 VITALS
DIASTOLIC BLOOD PRESSURE: 69 MMHG | OXYGEN SATURATION: 98 % | SYSTOLIC BLOOD PRESSURE: 158 MMHG | HEIGHT: 65 IN | HEART RATE: 72 BPM | BODY MASS INDEX: 25.92 KG/M2 | WEIGHT: 155.6 LBS | TEMPERATURE: 97.1 F | RESPIRATION RATE: 20 BRPM

## 2023-08-02 LAB
ANION GAP SERPL CALC-SCNC: 5 MMOL/L (ref 2–11)
BUN SERPL-MCNC: 14 MG/DL (ref 8–23)
CALCIUM SERPL-MCNC: 9.2 MG/DL (ref 8.3–10.4)
CHLORIDE SERPL-SCNC: 111 MMOL/L (ref 101–110)
CO2 SERPL-SCNC: 24 MMOL/L (ref 21–32)
CREAT SERPL-MCNC: 1.06 MG/DL (ref 0.6–1)
ERYTHROCYTE [DISTWIDTH] IN BLOOD BY AUTOMATED COUNT: 14.5 % (ref 11.9–14.6)
GLUCOSE SERPL-MCNC: 184 MG/DL (ref 65–100)
HCT VFR BLD AUTO: 42.7 % (ref 35.8–46.3)
HGB BLD-MCNC: 13.6 G/DL (ref 11.7–15.4)
MCH RBC QN AUTO: 29.3 PG (ref 26.1–32.9)
MCHC RBC AUTO-ENTMCNC: 31.9 G/DL (ref 31.4–35)
MCV RBC AUTO: 92 FL (ref 82–102)
NRBC # BLD: 0 K/UL (ref 0–0.2)
PLATELET # BLD AUTO: 247 K/UL (ref 150–450)
PMV BLD AUTO: 9.7 FL (ref 9.4–12.3)
POTASSIUM SERPL-SCNC: 3.6 MMOL/L (ref 3.5–5.1)
RBC # BLD AUTO: 4.64 M/UL (ref 4.05–5.2)
SODIUM SERPL-SCNC: 140 MMOL/L (ref 133–143)
WBC # BLD AUTO: 7.3 K/UL (ref 4.3–11.1)

## 2023-08-02 PROCEDURE — 80048 BASIC METABOLIC PNL TOTAL CA: CPT

## 2023-08-02 PROCEDURE — 85027 COMPLETE CBC AUTOMATED: CPT

## 2023-08-02 NOTE — PRE-PROCEDURE INSTRUCTIONS
Patient verified name and     Order for consent not found in EHR and matches case posting; patient verified. Type 3 surgery, walk in assessment complete. Labs per surgeon: pre anesthesia  Labs per anesthesia protocol:  cbc and bmp collected and results in Epic. Type and Screen DOS  EKG: in Epic from 2023 results wdl of anesthesia protocol. Cardiac clearance note in [facility] including order to Eliquis prior to surgery. Patient verbalized understanding of order    Hospital approved surgical skin cleanser and instructions given per hospital policy. Patient provided with and instructed on educational handouts including Guide to Surgery, Pain Management, Hand Hygiene, Blood Transfusion Education, and [facility] Brochure. Patient answered medical/surgical history questions at their best of ability. All prior to admission medications documented in [facility]. Original medication prescription bottle not visualized during patient appointment. Patient instructed to hold all vitamins 7 days prior to surgery and NSAIDS 5 days prior to surgery, patient verbalized understanding. Patient teach back successful and patient demonstrates knowledge of instructions.

## 2023-08-02 NOTE — PRE-PROCEDURE INSTRUCTIONS
PLEASE CONTINUE TAKING ALL PRESCRIPTION MEDICATIONS UP TO THE DAY OF SURGERY UNLESS OTHERWISE DIRECTED BELOW. DISCONTINUE all vitamins and supplements  days prior to surgery. DISCONTINUE Non-Steriodal Anti-Inflammatory (NSAIDS) such as Advil and Aleve 5 days prior to surgery. Home Medications to take  the day of surgery    LEVOTHYROXINE   METOPROLOL        Home Medications   to [facility] [address] 3 DAYS PRIOR TO SURGERY ( LAST DOSE 08/04/2023 PM )     HOLD FLEXERIL MORNING OF SURGERY    HOLD LASIX MORNING OF SURGERY    HOLD LOSARTAN MORNING OF SURGERY         Comments      On the day before surgery please take Acetaminophen (TYLENOL)  1000mg in the morning and then again before bed. 08/07/2023          Please do not bring home medications with you on the day of surgery unless otherwise directed by your nurse. If you are instructed to bring home medications, please give them to your nurse as they will be administered by the nursing staff. If you have any questions, please call [address] [place] [address] [place] [phone]. A copy of this note was provided to the patient for reference.

## 2023-08-07 ENCOUNTER — ANESTHESIA EVENT (OUTPATIENT)
Dept: SURGERY | Age: 83
End: 2023-08-07
Payer: MEDICARE

## 2023-08-07 RX ORDER — SODIUM CHLORIDE 9 MG/ML
INJECTION, SOLUTION INTRAVENOUS PRN
Status: CANCELLED | OUTPATIENT
Start: 2023-08-07

## 2023-08-07 RX ORDER — HEPARIN SODIUM 5000 [USP'U]/ML
5000 INJECTION, SOLUTION INTRAVENOUS; SUBCUTANEOUS ONCE
Status: CANCELLED | OUTPATIENT
Start: 2023-08-08

## 2023-08-07 RX ORDER — SODIUM CHLORIDE 0.9 % (FLUSH) 0.9 %
5-40 SYRINGE (ML) INJECTION PRN
Status: CANCELLED | OUTPATIENT
Start: 2023-08-07

## 2023-08-07 RX ORDER — SODIUM CHLORIDE 0.9 % (FLUSH) 0.9 %
5-40 SYRINGE (ML) INJECTION EVERY 12 HOURS SCHEDULED
Status: CANCELLED | OUTPATIENT
Start: 2023-08-07

## 2023-08-08 ENCOUNTER — ANESTHESIA (OUTPATIENT)
Dept: SURGERY | Age: 83
End: 2023-08-08
Payer: MEDICARE

## 2023-08-08 ENCOUNTER — HOSPITAL ENCOUNTER (OUTPATIENT)
Age: 83
Setting detail: OBSERVATION
Discharge: HOME | End: 2023-08-09
Attending: OBSTETRICS & GYNECOLOGY | Admitting: OBSTETRICS & GYNECOLOGY
Payer: MEDICARE

## 2023-08-08 DIAGNOSIS — C54.1 ADENOCARCINOMA OF THE ENDOMETRIUM/UTERUS (HCC): ICD-10-CM

## 2023-08-08 DIAGNOSIS — C54.1 ENDOMETRIAL CANCER (HCC): Primary | ICD-10-CM

## 2023-08-08 LAB
ABO + RH BLD: NORMAL
ANION GAP SERPL CALC-SCNC: 8 MMOL/L (ref 2–11)
BASOPHILS # BLD: 0 K/UL (ref 0–0.2)
BASOPHILS NFR BLD: 0 % (ref 0–2)
BLOOD GROUP ANTIBODIES SERPL: NORMAL
BUN SERPL-MCNC: 16 MG/DL (ref 8–23)
CALCIUM SERPL-MCNC: 8.7 MG/DL (ref 8.3–10.4)
CHLORIDE SERPL-SCNC: 111 MMOL/L (ref 101–110)
CO2 SERPL-SCNC: 20 MMOL/L (ref 21–32)
CREAT SERPL-MCNC: 1.3 MG/DL (ref 0.6–1)
DIFFERENTIAL METHOD BLD: ABNORMAL
EOSINOPHIL # BLD: 0 K/UL (ref 0–0.8)
EOSINOPHIL NFR BLD: 0 % (ref 0.5–7.8)
ERYTHROCYTE [DISTWIDTH] IN BLOOD BY AUTOMATED COUNT: 14.7 % (ref 11.9–14.6)
GLUCOSE BLD STRIP.AUTO-MCNC: 109 MG/DL (ref 65–100)
GLUCOSE BLD STRIP.AUTO-MCNC: 158 MG/DL (ref 65–100)
GLUCOSE BLD STRIP.AUTO-MCNC: 163 MG/DL (ref 65–100)
GLUCOSE SERPL-MCNC: 208 MG/DL (ref 65–100)
HCT VFR BLD AUTO: 41.8 % (ref 35.8–46.3)
HCT VFR BLD AUTO: 43.1 % (ref 35.8–46.3)
HGB BLD-MCNC: 13.5 G/DL (ref 11.7–15.4)
HGB BLD-MCNC: 13.9 G/DL (ref 11.7–15.4)
IMM GRANULOCYTES # BLD AUTO: 0.1 K/UL (ref 0–0.5)
IMM GRANULOCYTES NFR BLD AUTO: 1 % (ref 0–5)
LYMPHOCYTES # BLD: 0.6 K/UL (ref 0.5–4.6)
LYMPHOCYTES NFR BLD: 4 % (ref 13–44)
MCH RBC QN AUTO: 30.2 PG (ref 26.1–32.9)
MCHC RBC AUTO-ENTMCNC: 32.3 G/DL (ref 31.4–35)
MCV RBC AUTO: 93.5 FL (ref 82–102)
MONOCYTES # BLD: 0.5 K/UL (ref 0.1–1.3)
MONOCYTES NFR BLD: 3 % (ref 4–12)
NEUTS SEG # BLD: 16.3 K/UL (ref 1.7–8.2)
NEUTS SEG NFR BLD: 92 % (ref 43–78)
NRBC # BLD: 0 K/UL (ref 0–0.2)
PLATELET # BLD AUTO: 212 K/UL (ref 150–450)
PMV BLD AUTO: 10.1 FL (ref 9.4–12.3)
POTASSIUM SERPL-SCNC: 4.2 MMOL/L (ref 3.5–5.1)
RBC # BLD AUTO: 4.47 M/UL (ref 4.05–5.2)
SERVICE CMNT-IMP: ABNORMAL
SODIUM SERPL-SCNC: 139 MMOL/L (ref 133–143)
SPECIMEN EXP DATE BLD: NORMAL
WBC # BLD AUTO: 17.5 K/UL (ref 4.3–11.1)

## 2023-08-08 PROCEDURE — 6360000002 HC RX W HCPCS: Performed by: OBSTETRICS & GYNECOLOGY

## 2023-08-08 PROCEDURE — 2500000003 HC RX 250 WO HCPCS

## 2023-08-08 PROCEDURE — 2580000003 HC RX 258: Performed by: OBSTETRICS & GYNECOLOGY

## 2023-08-08 PROCEDURE — 96372 THER/PROPH/DIAG INJ SC/IM: CPT

## 2023-08-08 PROCEDURE — 88307 TISSUE EXAM BY PATHOLOGIST: CPT

## 2023-08-08 PROCEDURE — 82962 GLUCOSE BLOOD TEST: CPT

## 2023-08-08 PROCEDURE — 36415 COLL VENOUS BLD VENIPUNCTURE: CPT

## 2023-08-08 PROCEDURE — 85025 COMPLETE CBC W/AUTO DIFF WBC: CPT

## 2023-08-08 PROCEDURE — 2720000010 HC SURG SUPPLY STERILE: Performed by: OBSTETRICS & GYNECOLOGY

## 2023-08-08 PROCEDURE — 3600000009 HC SURGERY ROBOT BASE: Performed by: OBSTETRICS & GYNECOLOGY

## 2023-08-08 PROCEDURE — 2580000003 HC RX 258: Performed by: ANESTHESIOLOGY

## 2023-08-08 PROCEDURE — 6370000000 HC RX 637 (ALT 250 FOR IP): Performed by: ANESTHESIOLOGY

## 2023-08-08 PROCEDURE — 85018 HEMOGLOBIN: CPT

## 2023-08-08 PROCEDURE — 6370000000 HC RX 637 (ALT 250 FOR IP): Performed by: OBSTETRICS & GYNECOLOGY

## 2023-08-08 PROCEDURE — 2709999900 HC NON-CHARGEABLE SUPPLY: Performed by: OBSTETRICS & GYNECOLOGY

## 2023-08-08 PROCEDURE — 7100000000 HC PACU RECOVERY - FIRST 15 MIN: Performed by: OBSTETRICS & GYNECOLOGY

## 2023-08-08 PROCEDURE — 7100000001 HC PACU RECOVERY - ADDTL 15 MIN: Performed by: OBSTETRICS & GYNECOLOGY

## 2023-08-08 PROCEDURE — 2500000003 HC RX 250 WO HCPCS: Performed by: OBSTETRICS & GYNECOLOGY

## 2023-08-08 PROCEDURE — C9399 UNCLASSIFIED DRUGS OR BIOLOG: HCPCS

## 2023-08-08 PROCEDURE — 3700000000 HC ANESTHESIA ATTENDED CARE: Performed by: OBSTETRICS & GYNECOLOGY

## 2023-08-08 PROCEDURE — 86901 BLOOD TYPING SEROLOGIC RH(D): CPT

## 2023-08-08 PROCEDURE — 6360000002 HC RX W HCPCS

## 2023-08-08 PROCEDURE — 88112 CYTOPATH CELL ENHANCE TECH: CPT

## 2023-08-08 PROCEDURE — 3600000019 HC SURGERY ROBOT ADDTL 15MIN: Performed by: OBSTETRICS & GYNECOLOGY

## 2023-08-08 PROCEDURE — 85014 HEMATOCRIT: CPT

## 2023-08-08 PROCEDURE — S2900 ROBOTIC SURGICAL SYSTEM: HCPCS | Performed by: OBSTETRICS & GYNECOLOGY

## 2023-08-08 PROCEDURE — 6360000002 HC RX W HCPCS: Performed by: ANESTHESIOLOGY

## 2023-08-08 PROCEDURE — 3700000001 HC ADD 15 MINUTES (ANESTHESIA): Performed by: OBSTETRICS & GYNECOLOGY

## 2023-08-08 PROCEDURE — G0378 HOSPITAL OBSERVATION PER HR: HCPCS

## 2023-08-08 PROCEDURE — A4216 STERILE WATER/SALINE, 10 ML: HCPCS | Performed by: OBSTETRICS & GYNECOLOGY

## 2023-08-08 PROCEDURE — 86850 RBC ANTIBODY SCREEN: CPT

## 2023-08-08 PROCEDURE — 80048 BASIC METABOLIC PNL TOTAL CA: CPT

## 2023-08-08 PROCEDURE — 86900 BLOOD TYPING SEROLOGIC ABO: CPT

## 2023-08-08 RX ORDER — HYDROMORPHONE HYDROCHLORIDE 2 MG/ML
INJECTION, SOLUTION INTRAMUSCULAR; INTRAVENOUS; SUBCUTANEOUS PRN
Status: DISCONTINUED | OUTPATIENT
Start: 2023-08-08 | End: 2023-08-08 | Stop reason: SDUPTHER

## 2023-08-08 RX ORDER — SODIUM CHLORIDE 0.9 % (FLUSH) 0.9 %
5-40 SYRINGE (ML) INJECTION EVERY 12 HOURS SCHEDULED
Status: DISCONTINUED | OUTPATIENT
Start: 2023-08-08 | End: 2023-08-08 | Stop reason: HOSPADM

## 2023-08-08 RX ORDER — BUPIVACAINE HYDROCHLORIDE 5 MG/ML
INJECTION, SOLUTION EPIDURAL; INTRACAUDAL PRN
Status: DISCONTINUED | OUTPATIENT
Start: 2023-08-08 | End: 2023-08-08 | Stop reason: ALTCHOICE

## 2023-08-08 RX ORDER — LIDOCAINE HYDROCHLORIDE 10 MG/ML
1 INJECTION, SOLUTION INFILTRATION; PERINEURAL
Status: DISCONTINUED | OUTPATIENT
Start: 2023-08-08 | End: 2023-08-08 | Stop reason: HOSPADM

## 2023-08-08 RX ORDER — KETAMINE HYDROCHLORIDE 50 MG/ML
INJECTION, SOLUTION, CONCENTRATE INTRAMUSCULAR; INTRAVENOUS PRN
Status: DISCONTINUED | OUTPATIENT
Start: 2023-08-08 | End: 2023-08-08 | Stop reason: SDUPTHER

## 2023-08-08 RX ORDER — ONDANSETRON 4 MG/1
4 TABLET, ORALLY DISINTEGRATING ORAL EVERY 8 HOURS PRN
Status: DISCONTINUED | OUTPATIENT
Start: 2023-08-08 | End: 2023-08-09 | Stop reason: HOSPADM

## 2023-08-08 RX ORDER — METOPROLOL SUCCINATE 50 MG/1
100 TABLET, EXTENDED RELEASE ORAL DAILY
Status: DISCONTINUED | OUTPATIENT
Start: 2023-08-09 | End: 2023-08-09 | Stop reason: HOSPADM

## 2023-08-08 RX ORDER — HYDROCODONE BITARTRATE AND ACETAMINOPHEN 5; 325 MG/1; MG/1
1 TABLET ORAL EVERY 6 HOURS PRN
Status: DISCONTINUED | OUTPATIENT
Start: 2023-08-08 | End: 2023-08-09 | Stop reason: HOSPADM

## 2023-08-08 RX ORDER — MIDAZOLAM HYDROCHLORIDE 2 MG/2ML
2 INJECTION, SOLUTION INTRAMUSCULAR; INTRAVENOUS
Status: DISCONTINUED | OUTPATIENT
Start: 2023-08-08 | End: 2023-08-08 | Stop reason: HOSPADM

## 2023-08-08 RX ORDER — NITROGLYCERIN 0.4 MG/1
0.4 TABLET SUBLINGUAL EVERY 5 MIN PRN
Status: DISCONTINUED | OUTPATIENT
Start: 2023-08-08 | End: 2023-08-09 | Stop reason: HOSPADM

## 2023-08-08 RX ORDER — FAMOTIDINE 20 MG/1
20 TABLET, FILM COATED ORAL ONCE
Status: COMPLETED | OUTPATIENT
Start: 2023-08-08 | End: 2023-08-08

## 2023-08-08 RX ORDER — HYDROMORPHONE HYDROCHLORIDE 2 MG/ML
0.5 INJECTION, SOLUTION INTRAMUSCULAR; INTRAVENOUS; SUBCUTANEOUS EVERY 10 MIN PRN
Status: DISCONTINUED | OUTPATIENT
Start: 2023-08-08 | End: 2023-08-08 | Stop reason: HOSPADM

## 2023-08-08 RX ORDER — EPHEDRINE SULFATE/0.9% NACL/PF 50 MG/5 ML
SYRINGE (ML) INTRAVENOUS PRN
Status: DISCONTINUED | OUTPATIENT
Start: 2023-08-08 | End: 2023-08-08 | Stop reason: SDUPTHER

## 2023-08-08 RX ORDER — SODIUM CHLORIDE 9 MG/ML
INJECTION, SOLUTION INTRAVENOUS CONTINUOUS
Status: DISCONTINUED | OUTPATIENT
Start: 2023-08-08 | End: 2023-08-08 | Stop reason: HOSPADM

## 2023-08-08 RX ORDER — SODIUM CHLORIDE 0.9 % (FLUSH) 0.9 %
5-40 SYRINGE (ML) INJECTION PRN
Status: DISCONTINUED | OUTPATIENT
Start: 2023-08-08 | End: 2023-08-09 | Stop reason: HOSPADM

## 2023-08-08 RX ORDER — OXYCODONE HYDROCHLORIDE 5 MG/1
10 TABLET ORAL PRN
Status: COMPLETED | OUTPATIENT
Start: 2023-08-08 | End: 2023-08-08

## 2023-08-08 RX ORDER — ONDANSETRON 2 MG/ML
4 INJECTION INTRAMUSCULAR; INTRAVENOUS
Status: COMPLETED | OUTPATIENT
Start: 2023-08-08 | End: 2023-08-08

## 2023-08-08 RX ORDER — ROCURONIUM BROMIDE 10 MG/ML
INJECTION, SOLUTION INTRAVENOUS PRN
Status: DISCONTINUED | OUTPATIENT
Start: 2023-08-08 | End: 2023-08-08 | Stop reason: SDUPTHER

## 2023-08-08 RX ORDER — SODIUM CHLORIDE 0.9 % (FLUSH) 0.9 %
5-40 SYRINGE (ML) INJECTION PRN
Status: DISCONTINUED | OUTPATIENT
Start: 2023-08-08 | End: 2023-08-08 | Stop reason: HOSPADM

## 2023-08-08 RX ORDER — SODIUM CHLORIDE 0.9 % (FLUSH) 0.9 %
5-40 SYRINGE (ML) INJECTION EVERY 12 HOURS SCHEDULED
Status: DISCONTINUED | OUTPATIENT
Start: 2023-08-08 | End: 2023-08-09 | Stop reason: HOSPADM

## 2023-08-08 RX ORDER — SODIUM CHLORIDE 9 MG/ML
INJECTION, SOLUTION INTRAVENOUS PRN
Status: DISCONTINUED | OUTPATIENT
Start: 2023-08-08 | End: 2023-08-08 | Stop reason: HOSPADM

## 2023-08-08 RX ORDER — FENTANYL CITRATE 50 UG/ML
INJECTION, SOLUTION INTRAMUSCULAR; INTRAVENOUS PRN
Status: DISCONTINUED | OUTPATIENT
Start: 2023-08-08 | End: 2023-08-08 | Stop reason: SDUPTHER

## 2023-08-08 RX ORDER — ACETAMINOPHEN 325 MG/1
650 TABLET ORAL EVERY 4 HOURS PRN
Status: DISCONTINUED | OUTPATIENT
Start: 2023-08-08 | End: 2023-08-09 | Stop reason: HOSPADM

## 2023-08-08 RX ORDER — METRONIDAZOLE 500 MG/100ML
500 INJECTION, SOLUTION INTRAVENOUS ONCE
Status: COMPLETED | OUTPATIENT
Start: 2023-08-08 | End: 2023-08-09

## 2023-08-08 RX ORDER — INDOCYANINE GREEN AND WATER 25 MG
KIT INJECTION PRN
Status: DISCONTINUED | OUTPATIENT
Start: 2023-08-08 | End: 2023-08-08 | Stop reason: ALTCHOICE

## 2023-08-08 RX ORDER — OXYCODONE HYDROCHLORIDE 5 MG/1
5 TABLET ORAL PRN
Status: COMPLETED | OUTPATIENT
Start: 2023-08-08 | End: 2023-08-08

## 2023-08-08 RX ORDER — INSULIN LISPRO 100 [IU]/ML
0-8 INJECTION, SOLUTION INTRAVENOUS; SUBCUTANEOUS
Status: DISCONTINUED | OUTPATIENT
Start: 2023-08-08 | End: 2023-08-09 | Stop reason: HOSPADM

## 2023-08-08 RX ORDER — ONDANSETRON 2 MG/ML
INJECTION INTRAMUSCULAR; INTRAVENOUS PRN
Status: DISCONTINUED | OUTPATIENT
Start: 2023-08-08 | End: 2023-08-08 | Stop reason: SDUPTHER

## 2023-08-08 RX ORDER — MAGNESIUM HYDROXIDE/ALUMINUM HYDROXICE/SIMETHICONE 120; 1200; 1200 MG/30ML; MG/30ML; MG/30ML
15 SUSPENSION ORAL EVERY 6 HOURS PRN
Status: DISCONTINUED | OUTPATIENT
Start: 2023-08-08 | End: 2023-08-09 | Stop reason: HOSPADM

## 2023-08-08 RX ORDER — FENTANYL CITRATE 50 UG/ML
100 INJECTION, SOLUTION INTRAMUSCULAR; INTRAVENOUS
Status: DISCONTINUED | OUTPATIENT
Start: 2023-08-08 | End: 2023-08-08 | Stop reason: HOSPADM

## 2023-08-08 RX ORDER — METRONIDAZOLE 500 MG/100ML
500 INJECTION, SOLUTION INTRAVENOUS EVERY 8 HOURS
Status: COMPLETED | OUTPATIENT
Start: 2023-08-08 | End: 2023-08-08

## 2023-08-08 RX ORDER — IBUPROFEN 400 MG/1
200 TABLET ORAL EVERY 8 HOURS PRN
Status: DISCONTINUED | OUTPATIENT
Start: 2023-08-08 | End: 2023-08-09 | Stop reason: HOSPADM

## 2023-08-08 RX ORDER — DEXTROSE MONOHYDRATE 100 MG/ML
INJECTION, SOLUTION INTRAVENOUS CONTINUOUS PRN
Status: DISCONTINUED | OUTPATIENT
Start: 2023-08-08 | End: 2023-08-09 | Stop reason: HOSPADM

## 2023-08-08 RX ORDER — 0.9 % SODIUM CHLORIDE 0.9 %
500 INTRAVENOUS SOLUTION INTRAVENOUS ONCE
Status: COMPLETED | OUTPATIENT
Start: 2023-08-08 | End: 2023-08-08

## 2023-08-08 RX ORDER — DEXAMETHASONE SODIUM PHOSPHATE 4 MG/ML
INJECTION, SOLUTION INTRA-ARTICULAR; INTRALESIONAL; INTRAMUSCULAR; INTRAVENOUS; SOFT TISSUE PRN
Status: DISCONTINUED | OUTPATIENT
Start: 2023-08-08 | End: 2023-08-08 | Stop reason: SDUPTHER

## 2023-08-08 RX ORDER — INSULIN LISPRO 100 [IU]/ML
0-4 INJECTION, SOLUTION INTRAVENOUS; SUBCUTANEOUS NIGHTLY
Status: DISCONTINUED | OUTPATIENT
Start: 2023-08-08 | End: 2023-08-09 | Stop reason: HOSPADM

## 2023-08-08 RX ORDER — ONDANSETRON 2 MG/ML
4 INJECTION INTRAMUSCULAR; INTRAVENOUS EVERY 6 HOURS PRN
Status: DISCONTINUED | OUTPATIENT
Start: 2023-08-08 | End: 2023-08-09 | Stop reason: HOSPADM

## 2023-08-08 RX ORDER — PROPOFOL 10 MG/ML
INJECTION, EMULSION INTRAVENOUS PRN
Status: DISCONTINUED | OUTPATIENT
Start: 2023-08-08 | End: 2023-08-08 | Stop reason: SDUPTHER

## 2023-08-08 RX ORDER — SENNA AND DOCUSATE SODIUM 50; 8.6 MG/1; MG/1
2 TABLET, FILM COATED ORAL DAILY PRN
Status: DISCONTINUED | OUTPATIENT
Start: 2023-08-08 | End: 2023-08-09 | Stop reason: HOSPADM

## 2023-08-08 RX ORDER — HYDROMORPHONE HYDROCHLORIDE 2 MG/ML
0.25 INJECTION, SOLUTION INTRAMUSCULAR; INTRAVENOUS; SUBCUTANEOUS EVERY 5 MIN PRN
Status: COMPLETED | OUTPATIENT
Start: 2023-08-08 | End: 2023-08-08

## 2023-08-08 RX ORDER — HEPARIN SODIUM 5000 [USP'U]/ML
5000 INJECTION, SOLUTION INTRAVENOUS; SUBCUTANEOUS ONCE
Status: COMPLETED | OUTPATIENT
Start: 2023-08-08 | End: 2023-08-08

## 2023-08-08 RX ORDER — SODIUM CHLORIDE 9 MG/ML
INJECTION, SOLUTION INTRAVENOUS PRN
Status: DISCONTINUED | OUTPATIENT
Start: 2023-08-08 | End: 2023-08-09 | Stop reason: HOSPADM

## 2023-08-08 RX ORDER — METOCLOPRAMIDE 10 MG/1
5 TABLET ORAL
Status: DISCONTINUED | OUTPATIENT
Start: 2023-08-08 | End: 2023-08-09 | Stop reason: HOSPADM

## 2023-08-08 RX ORDER — PROCHLORPERAZINE EDISYLATE 5 MG/ML
5 INJECTION INTRAMUSCULAR; INTRAVENOUS EVERY 6 HOURS PRN
Status: DISCONTINUED | OUTPATIENT
Start: 2023-08-08 | End: 2023-08-09 | Stop reason: HOSPADM

## 2023-08-08 RX ORDER — SODIUM CHLORIDE, SODIUM LACTATE, POTASSIUM CHLORIDE, CALCIUM CHLORIDE 600; 310; 30; 20 MG/100ML; MG/100ML; MG/100ML; MG/100ML
INJECTION, SOLUTION INTRAVENOUS CONTINUOUS
Status: DISCONTINUED | OUTPATIENT
Start: 2023-08-08 | End: 2023-08-08 | Stop reason: HOSPADM

## 2023-08-08 RX ORDER — SODIUM CHLORIDE, SODIUM LACTATE, POTASSIUM CHLORIDE, CALCIUM CHLORIDE 600; 310; 30; 20 MG/100ML; MG/100ML; MG/100ML; MG/100ML
INJECTION, SOLUTION INTRAVENOUS CONTINUOUS
Status: DISCONTINUED | OUTPATIENT
Start: 2023-08-08 | End: 2023-08-08

## 2023-08-08 RX ORDER — LIDOCAINE HYDROCHLORIDE 20 MG/ML
INJECTION, SOLUTION EPIDURAL; INFILTRATION; INTRACAUDAL; PERINEURAL PRN
Status: DISCONTINUED | OUTPATIENT
Start: 2023-08-08 | End: 2023-08-08 | Stop reason: SDUPTHER

## 2023-08-08 RX ORDER — LEVOTHYROXINE SODIUM 0.05 MG/1
50 TABLET ORAL
Status: DISCONTINUED | OUTPATIENT
Start: 2023-08-09 | End: 2023-08-09 | Stop reason: HOSPADM

## 2023-08-08 RX ORDER — ACETAMINOPHEN 500 MG
1000 TABLET ORAL ONCE
Status: COMPLETED | OUTPATIENT
Start: 2023-08-08 | End: 2023-08-08

## 2023-08-08 RX ORDER — METFORMIN HYDROCHLORIDE 500 MG/1
500 TABLET, EXTENDED RELEASE ORAL
Status: DISCONTINUED | OUTPATIENT
Start: 2023-08-08 | End: 2023-08-09 | Stop reason: HOSPADM

## 2023-08-08 RX ORDER — DIPHENHYDRAMINE HYDROCHLORIDE 50 MG/ML
12.5 INJECTION INTRAMUSCULAR; INTRAVENOUS
Status: DISCONTINUED | OUTPATIENT
Start: 2023-08-08 | End: 2023-08-08 | Stop reason: HOSPADM

## 2023-08-08 RX ORDER — FAMOTIDINE 20 MG/1
20 TABLET, FILM COATED ORAL DAILY
Status: DISCONTINUED | OUTPATIENT
Start: 2023-08-08 | End: 2023-08-09 | Stop reason: HOSPADM

## 2023-08-08 RX ADMIN — HYDROMORPHONE HYDROCHLORIDE 0.25 MG: 2 INJECTION, SOLUTION INTRAMUSCULAR; INTRAVENOUS; SUBCUTANEOUS at 09:15

## 2023-08-08 RX ADMIN — FAMOTIDINE 20 MG: 10 INJECTION, SOLUTION INTRAVENOUS at 13:57

## 2023-08-08 RX ADMIN — METFORMIN HYDROCHLORIDE 500 MG: 500 TABLET, EXTENDED RELEASE ORAL at 17:07

## 2023-08-08 RX ADMIN — HYDROMORPHONE HYDROCHLORIDE 0.5 MG: 2 INJECTION, SOLUTION INTRAMUSCULAR; INTRAVENOUS; SUBCUTANEOUS at 09:29

## 2023-08-08 RX ADMIN — DEXAMETHASONE SODIUM PHOSPHATE 4 MG: 4 INJECTION INTRA-ARTICULAR; INTRALESIONAL; INTRAMUSCULAR; INTRAVENOUS; SOFT TISSUE at 07:55

## 2023-08-08 RX ADMIN — FENTANYL CITRATE 50 MCG: 50 INJECTION, SOLUTION INTRAMUSCULAR; INTRAVENOUS at 07:38

## 2023-08-08 RX ADMIN — Medication 2000 MG: at 07:49

## 2023-08-08 RX ADMIN — KETAMINE HYDROCHLORIDE 20 MG: 50 INJECTION, SOLUTION INTRAMUSCULAR; INTRAVENOUS at 07:58

## 2023-08-08 RX ADMIN — HYDROMORPHONE HYDROCHLORIDE 0.4 MG: 2 INJECTION INTRAMUSCULAR; INTRAVENOUS; SUBCUTANEOUS at 08:23

## 2023-08-08 RX ADMIN — SODIUM CHLORIDE, POTASSIUM CHLORIDE, SODIUM LACTATE AND CALCIUM CHLORIDE: 600; 310; 30; 20 INJECTION, SOLUTION INTRAVENOUS at 07:00

## 2023-08-08 RX ADMIN — METRONIDAZOLE 500 MG: 500 INJECTION, SOLUTION INTRAVENOUS at 13:57

## 2023-08-08 RX ADMIN — FENTANYL CITRATE 50 MCG: 50 INJECTION, SOLUTION INTRAMUSCULAR; INTRAVENOUS at 08:13

## 2023-08-08 RX ADMIN — KETAMINE HYDROCHLORIDE 20 MG: 50 INJECTION, SOLUTION INTRAMUSCULAR; INTRAVENOUS at 08:13

## 2023-08-08 RX ADMIN — METOCLOPRAMIDE 5 MG: 10 TABLET ORAL at 17:07

## 2023-08-08 RX ADMIN — HEPARIN SODIUM 5000 UNITS: 5000 INJECTION INTRAVENOUS; SUBCUTANEOUS at 06:58

## 2023-08-08 RX ADMIN — ROCURONIUM BROMIDE 50 MG: 10 INJECTION, SOLUTION INTRAVENOUS at 07:38

## 2023-08-08 RX ADMIN — CEFAZOLIN SODIUM 2000 MG: 100 INJECTION, POWDER, LYOPHILIZED, FOR SOLUTION INTRAVENOUS at 15:34

## 2023-08-08 RX ADMIN — METRONIDAZOLE 500 MG: 500 INJECTION, SOLUTION INTRAVENOUS at 21:29

## 2023-08-08 RX ADMIN — ONDANSETRON 4 MG: 2 INJECTION INTRAMUSCULAR; INTRAVENOUS at 13:00

## 2023-08-08 RX ADMIN — Medication 10 MG: at 07:51

## 2023-08-08 RX ADMIN — SODIUM CHLORIDE 500 ML: 9 INJECTION, SOLUTION INTRAVENOUS at 13:57

## 2023-08-08 RX ADMIN — SODIUM CHLORIDE, PRESERVATIVE FREE 10 ML: 5 INJECTION INTRAVENOUS at 21:30

## 2023-08-08 RX ADMIN — PROCHLORPERAZINE EDISYLATE 5 MG: 5 INJECTION INTRAMUSCULAR; INTRAVENOUS at 13:56

## 2023-08-08 RX ADMIN — METRONIDAZOLE 500 MG: 500 INJECTION, SOLUTION INTRAVENOUS at 07:07

## 2023-08-08 RX ADMIN — HYDROMORPHONE HYDROCHLORIDE 0.25 MG: 2 INJECTION, SOLUTION INTRAMUSCULAR; INTRAVENOUS; SUBCUTANEOUS at 09:20

## 2023-08-08 RX ADMIN — PROPOFOL 130 MG: 10 INJECTION, EMULSION INTRAVENOUS at 07:37

## 2023-08-08 RX ADMIN — LIDOCAINE HYDROCHLORIDE 100 MG: 20 INJECTION, SOLUTION EPIDURAL; INFILTRATION; INTRACAUDAL; PERINEURAL at 07:37

## 2023-08-08 RX ADMIN — ONDANSETRON 4 MG: 2 INJECTION INTRAMUSCULAR; INTRAVENOUS at 13:56

## 2023-08-08 RX ADMIN — Medication 5 MG: at 08:00

## 2023-08-08 RX ADMIN — OXYCODONE HYDROCHLORIDE 5 MG: 5 TABLET ORAL at 10:18

## 2023-08-08 RX ADMIN — ONDANSETRON 4 MG: 2 INJECTION INTRAMUSCULAR; INTRAVENOUS at 09:01

## 2023-08-08 RX ADMIN — SUGAMMADEX 200 MG: 100 INJECTION, SOLUTION INTRAVENOUS at 09:03

## 2023-08-08 RX ADMIN — FAMOTIDINE 20 MG: 20 TABLET, FILM COATED ORAL at 06:52

## 2023-08-08 RX ADMIN — ACETAMINOPHEN 1000 MG: 500 TABLET, FILM COATED ORAL at 06:52

## 2023-08-08 ASSESSMENT — PAIN DESCRIPTION - FREQUENCY: FREQUENCY: CONTINUOUS

## 2023-08-08 ASSESSMENT — PAIN SCALES - GENERAL
PAINLEVEL_OUTOF10: 8
PAINLEVEL_OUTOF10: 6
PAINLEVEL_OUTOF10: 4

## 2023-08-08 ASSESSMENT — PAIN DESCRIPTION - DESCRIPTORS: DESCRIPTORS: ACHING;BURNING;PRESSURE

## 2023-08-08 ASSESSMENT — PAIN DESCRIPTION - LOCATION
LOCATION: ABDOMEN
LOCATION: ABDOMEN

## 2023-08-08 ASSESSMENT — PAIN - FUNCTIONAL ASSESSMENT
PAIN_FUNCTIONAL_ASSESSMENT: PREVENTS OR INTERFERES SOME ACTIVE ACTIVITIES AND ADLS
PAIN_FUNCTIONAL_ASSESSMENT: 0-10
PAIN_FUNCTIONAL_ASSESSMENT: 0-10

## 2023-08-08 ASSESSMENT — PAIN DESCRIPTION - ONSET: ONSET: ON-GOING

## 2023-08-08 NOTE — DISCHARGE INSTRUCTIONS
ACTIVITY  As tolerated and as directed by your doctor. Bathe or shower as directed by your doctor. DIET  Clear liquids until no nausea or vomiting; then light diet for the first day. Advance to regular diet on second day, unless your doctor orders otherwise. If nausea and vomiting continues, call your doctor. PAIN  Take pain medication as directed by your doctor. Call your doctor if pain is NOT relieved by medication. DO NOT take aspirin of blood thinners unless directed by your doctor. MEDICATION INTERACTION:During your procedure you potentially received a medication or medications which may reduce the effectiveness of oral contraceptives. Please consider other forms of contraception for 1 month following your procedure if you are currently using oral contraceptives as your primary form of birth control. In addition to this, we recommend continuing your oral contraceptive as prescribed, unless otherwise instructed by your physician, during this time      [address] [facility] [address] IF   Excessive bleeding that does not stop after holding pressure over the area  Temperature of 101 degrees F or above  Excessive redness, swelling or bruising, and/ or green or yellow, smelly discharge from incision    After general anesthesia or intravenous sedation, for 24 hours or while taking prescription Narcotics:  Limit your activities  A responsible adult needs to be with you for the next 24 hours  Do not drive and operate hazardous machinery  Do not make important personal or business decisions  Do not drink alcoholic beverages  If you have not urinated within 8 hours after discharge, and you are experiencing discomfort from urinary retention, please go to the nearest ED. If you have sleep apnea and have a CPAP machine, please use it for all naps and sleeping. Please use caution when taking narcotics and any of your home medications that may cause drowsiness.   *  Please give a list of your current medications to

## 2023-08-08 NOTE — ANESTHESIA POSTPROCEDURE EVALUATION
Department of Anesthesiology  Postprocedure Note    Patient: Jigar Leyva  MRN: [id number]  YOB: 1940  Date of evaluation: 8/8/2023      Procedure Summary     Date: 08/08/23 Room / Location: [facility] MAIN OR  / [facility] MAIN OR    Anesthesia Start: 0728 Anesthesia Stop: 5038    Procedure: ROBOTIC ASSIST LAPAROSCOPIC HYSTERECTOMY WITH BILATERAL SALPINGO OOPHERECTOMY WITH STAGING (Abdomen) Diagnosis:       Adenocarcinoma of the endometrium/uterus ([address])      (Adenocarcinoma of the endometrium/uterus ([address]) [C54.1])    Providers: Rafat Riggs MD Responsible Provider: Stacie Wright MD    Anesthesia Type: general ASA Status: 3          Anesthesia Type: No value filed.     Mary Phase I: Mary Score: 10    Mary Phase II:        Anesthesia Post Evaluation    Patient location during evaluation: PACU  Patient participation: complete - patient participated  Level of consciousness: awake and alert  Airway patency: patent  Nausea & Vomiting: no nausea  Cardiovascular status: hemodynamically stable  Respiratory status: acceptable  Hydration status: euvolemic  Pain management: adequate and satisfactory to patient

## 2023-08-08 NOTE — BRIEF OP NOTE
Brief Postoperative Note      Patient: Ayesha Fay  YOB: 1940  MRN: [id number]    Date of Procedure: 8/8/2023    Pre-Op Diagnosis Codes:     * Adenocarcinoma of the endometrium/uterus ([address]) [C54.1]    Post-Op Diagnosis: Same       Procedure(s):  ROBOTIC ASSIST LAPAROSCOPIC HYSTERECTOMY WITH BILATERAL SALPINGO OOPHERECTOMY WITH STAGING  Injection dye to  sentinal node id  Robotic retroperitoneal lymnph node biopsies, bilateral  Surgeon(s):  Hyacinth Rothman MD    Assistant:  * No surgical staff found *    Anesthesia: General    Estimated Blood Loss (mL): less than 978     Complications: None    Specimens:   ID Type Source Tests Collected by Time Destination   A : Pelvic washings Body Fluid Pelvic Washings CYTOLOGY, NON-GYN Hyacinth Rothman MD 8/8/2023 0961    B : Bilateral pelvic sentinel lymph nodes Tissue Lymph Node SURGICAL PATHOLOGY Hyacinth Rothman MD 8/8/2023 9447    C : Uterus, cervix, bilateral tubes and ovaries Tissue Uterus SURGICAL PATHOLOGY Hyacinth Rothman MD 8/8/2023 8330        Implants:  * No implants in log *      Drains:   [REMOVED] Urinary Catheter 08/08/23 [address] (Removed)       Findings: anay, bilateral sent nodes.       Electronically signed by Hyacinth Rothman MD on 8/8/2023 at 9:19 AM

## 2023-08-08 NOTE — ANESTHESIA PROCEDURE NOTES
Airway  Date/Time: 8/8/2023 7:39 AM  Urgency: elective    Airway not difficult    General Information and Staff    Patient location during procedure: OR  Performed: resident/CRNA     Indications and Patient Condition  Indications for airway management: anesthesia  Spontaneous Ventilation: absent  Sedation level: deep  Preoxygenated: yes  Patient position: sniffing  MILS not maintained throughout  Mask difficulty assessment: vent by bag mask    Final Airway Details  Final airway type: endotracheal airway      Successful airway: ETT  Cuffed: yes   Successful intubation technique: direct laryngoscopy  Facilitating devices/methods: intubating stylet  Endotracheal tube insertion site: oral  Blade: Edgar  Blade size: #3  ETT size (mm): 7.5  Cormack-Lehane Classification: grade IIa - partial view of glottis  Placement verified by: chest auscultation and capnometry   Measured from: lips  Number of attempts at approach: 1  Ventilation between attempts: bag mask  Number of other approaches attempted: 0    no

## 2023-08-08 NOTE — PLAN OF CARE
Problem: Chronic Conditions and Co-morbidities  Goal: Patient's chronic conditions and co-morbidity symptoms are monitored and maintained or improved  Outcome: Progressing     Problem: Pain  Goal: Verbalizes/displays adequate comfort level or baseline comfort level  Outcome: Progressing     Problem: Safety - Adult  Goal: Free from fall injury  Outcome: Progressing     Problem: Neurosensory - Adult  Goal: Achieves stable or improved neurological status  Outcome: Progressing  Goal: Achieves maximal functionality and self care  Outcome: Progressing

## 2023-08-09 VITALS
HEART RATE: 68 BPM | TEMPERATURE: 98.4 F | DIASTOLIC BLOOD PRESSURE: 62 MMHG | BODY MASS INDEX: 25.69 KG/M2 | WEIGHT: 154.19 LBS | SYSTOLIC BLOOD PRESSURE: 153 MMHG | HEIGHT: 65 IN | OXYGEN SATURATION: 96 % | RESPIRATION RATE: 20 BRPM

## 2023-08-09 LAB
ANION GAP SERPL CALC-SCNC: 5 MMOL/L (ref 2–11)
BASOPHILS # BLD: 0 K/UL (ref 0–0.2)
BASOPHILS NFR BLD: 0 % (ref 0–2)
BUN SERPL-MCNC: 15 MG/DL (ref 8–23)
CALCIUM SERPL-MCNC: 9.3 MG/DL (ref 8.3–10.4)
CHLORIDE SERPL-SCNC: 113 MMOL/L (ref 101–110)
CO2 SERPL-SCNC: 24 MMOL/L (ref 21–32)
CREAT SERPL-MCNC: 1 MG/DL (ref 0.6–1)
DIFFERENTIAL METHOD BLD: ABNORMAL
EOSINOPHIL # BLD: 0 K/UL (ref 0–0.8)
EOSINOPHIL NFR BLD: 0 % (ref 0.5–7.8)
ERYTHROCYTE [DISTWIDTH] IN BLOOD BY AUTOMATED COUNT: 15.1 % (ref 11.9–14.6)
GLUCOSE BLD STRIP.AUTO-MCNC: 110 MG/DL (ref 65–100)
GLUCOSE SERPL-MCNC: 123 MG/DL (ref 65–100)
HCT VFR BLD AUTO: 42.5 % (ref 35.8–46.3)
HGB BLD-MCNC: 13.5 G/DL (ref 11.7–15.4)
IMM GRANULOCYTES # BLD AUTO: 0.1 K/UL (ref 0–0.5)
IMM GRANULOCYTES NFR BLD AUTO: 0 % (ref 0–5)
LYMPHOCYTES # BLD: 1.4 K/UL (ref 0.5–4.6)
LYMPHOCYTES NFR BLD: 7 % (ref 13–44)
MCH RBC QN AUTO: 29.7 PG (ref 26.1–32.9)
MCHC RBC AUTO-ENTMCNC: 31.8 G/DL (ref 31.4–35)
MCV RBC AUTO: 93.4 FL (ref 82–102)
MONOCYTES # BLD: 1.1 K/UL (ref 0.1–1.3)
MONOCYTES NFR BLD: 6 % (ref 4–12)
NEUTS SEG # BLD: 16.5 K/UL (ref 1.7–8.2)
NEUTS SEG NFR BLD: 87 % (ref 43–78)
NRBC # BLD: 0 K/UL (ref 0–0.2)
PLATELET # BLD AUTO: 244 K/UL (ref 150–450)
PMV BLD AUTO: 10.5 FL (ref 9.4–12.3)
POTASSIUM SERPL-SCNC: 3.9 MMOL/L (ref 3.5–5.1)
RBC # BLD AUTO: 4.55 M/UL (ref 4.05–5.2)
SERVICE CMNT-IMP: ABNORMAL
SODIUM SERPL-SCNC: 142 MMOL/L (ref 133–143)
WBC # BLD AUTO: 19.1 K/UL (ref 4.3–11.1)

## 2023-08-09 PROCEDURE — 85025 COMPLETE CBC W/AUTO DIFF WBC: CPT

## 2023-08-09 PROCEDURE — 6370000000 HC RX 637 (ALT 250 FOR IP): Performed by: OBSTETRICS & GYNECOLOGY

## 2023-08-09 PROCEDURE — 80048 BASIC METABOLIC PNL TOTAL CA: CPT

## 2023-08-09 PROCEDURE — 36415 COLL VENOUS BLD VENIPUNCTURE: CPT

## 2023-08-09 PROCEDURE — 2580000003 HC RX 258: Performed by: OBSTETRICS & GYNECOLOGY

## 2023-08-09 PROCEDURE — 82962 GLUCOSE BLOOD TEST: CPT

## 2023-08-09 PROCEDURE — G0378 HOSPITAL OBSERVATION PER HR: HCPCS

## 2023-08-09 PROCEDURE — 6360000002 HC RX W HCPCS: Performed by: OBSTETRICS & GYNECOLOGY

## 2023-08-09 RX ORDER — SENNA AND DOCUSATE SODIUM 50; 8.6 MG/1; MG/1
1 TABLET, FILM COATED ORAL DAILY PRN
Qty: 30 TABLET | Refills: 0 | Status: SHIPPED | OUTPATIENT
Start: 2023-08-09 | End: 2023-09-08

## 2023-08-09 RX ORDER — ONDANSETRON 4 MG/1
4 TABLET, ORALLY DISINTEGRATING ORAL EVERY 8 HOURS PRN
Qty: 20 TABLET | Refills: 0 | Status: SHIPPED | OUTPATIENT
Start: 2023-08-09

## 2023-08-09 RX ORDER — HYDROCODONE BITARTRATE AND ACETAMINOPHEN 5; 325 MG/1; MG/1
1 TABLET ORAL EVERY 6 HOURS PRN
Qty: 12 TABLET | Refills: 0 | Status: SHIPPED | OUTPATIENT
Start: 2023-08-09 | End: 2023-08-14

## 2023-08-09 RX ORDER — IBUPROFEN 200 MG
200 TABLET ORAL EVERY 8 HOURS PRN
Qty: 15 TABLET | Refills: 0 | Status: SHIPPED | OUTPATIENT
Start: 2023-08-09

## 2023-08-09 RX ADMIN — METOCLOPRAMIDE 5 MG: 10 TABLET ORAL at 06:27

## 2023-08-09 RX ADMIN — METOPROLOL SUCCINATE 100 MG: 50 TABLET, EXTENDED RELEASE ORAL at 08:58

## 2023-08-09 RX ADMIN — LEVOTHYROXINE SODIUM 50 MCG: 0.05 TABLET ORAL at 06:27

## 2023-08-09 RX ADMIN — CEFAZOLIN SODIUM 2000 MG: 100 INJECTION, POWDER, LYOPHILIZED, FOR SOLUTION INTRAVENOUS at 00:23

## 2023-08-09 RX ADMIN — FAMOTIDINE 20 MG: 20 TABLET, FILM COATED ORAL at 08:58

## 2023-08-09 ASSESSMENT — PAIN SCALES - GENERAL: PAINLEVEL_OUTOF10: 0

## 2023-08-09 NOTE — PROGRESS NOTES
0720: Received pt from Tomi Olvera in stable condition. Pt in bed resting quietly. Resp even & unlabored on room air; no acute signs of distress noted. Bed low & locked; call light in reach; no needs voiced. 7340: Discharge instructions & prescription information given to pt. Verbalized understanding. IV removed. Tip intact. Opportunity for questions provided. Instructed pt to call when ready to be taken down.

## 2023-08-09 NOTE — CARE COORDINATION
Pt is for discharge home today with no needs/supportive care orders received for CM at this time. Pt will have close follow up at the [address] [address] [place] [address] met    ASSESSMENT NOTE    Attending Physician: Janene Young MD  Admit Problem: Adenocarcinoma of the endometrium/uterus [facility]) [C54.1]  Endometrial cancer ([facility]) [C54.1]  Date/Time of Admission: 8/8/2023  5:46 AM  Problem List:  Patient Active Problem List   Diagnosis    Hypertension    Type 2 diabetes mellitus, without long-term current use of insulin (HCC)    Paroxysmal atrial fibrillation (HCC)    Venous insufficiency of both lower extremities    Hypothyroid    Atherosclerosis of native coronary artery of native heart with stable angina pectoris ([facility])    Hyperlipidemia    Chronic bilateral low back pain without sciatica    Type 2 diabetes mellitus with chronic kidney disease    Postmenopausal bleeding    PMB (postmenopausal bleeding)    Adenocarcinoma of the endometrium/uterus [facility])    Endometrial cancer [facility])       Service Assessment  Patient Orientation Alert and Oriented   Cognition Alert   History Provided By Medical Record, Patient   Primary Caregiver Self   Accompanied By/Relationship n/a   Support Systems Spouse/Significant Other, Children, Family Members   Patient's Healthcare Decision Maker is: Legal Next of Kin   PCP Verified by CM Yes   Last Visit to PCP Within last 3 months   Prior Functional Level Independent in ADLs/IADLs   Current Functional Level Independent in ADLs/IADLs   Can patient return to prior living arrangement Yes   Ability to make needs known: Good   Family able to assist with home care needs: Yes   Would you like for me to discuss the discharge plan with any other family members/significant others, and if so, who?  No   Financial Resources [employer] None   CM/SW Referral Other (see comment) (n/a)     Social/Functional History  Lives With Spouse   Type of [address] [place] [address]  One level   Home Access

## 2023-08-09 NOTE — OP NOTE
[facility]  OPERATIVE REPORT    Name:  Sonu Nelson  MR#:  [id number]  :  1940  ACCOUNT #:  [de-identified]  DATE OF SERVICE:  2023    PREOPERATIVE DIAGNOSIS:  Endometrial adenocarcinoma. POSTOPERATIVE DIAGNOSIS:  Endometrial adenocarcinoma. PROCEDURES PERFORMED:  1. Laparoscopic robotic hysterectomy, bilateral salpingo-oophorectomy. 2.  Injection of radioactive dye for sentinel lymph node identification. 3.  Laparoscopic robotic retroperitoneal lymph node biopsies, bilateral.    SURGEON:  Ioana Mcelroy MD        ANESTHESIA:  General.    COMPLICATIONS:  None. SPECIMENS REMOVED:  Pathology, above and washings. ESTIMATED BLOOD LOSS:  Minimal.    PACKS:  None. DRAINS:  Straight Abernathy catheter, discontinued at completion. DISPOSITION:  PACU. INDICATION: The patient had bleeding leading to biopsy showing above. Risks, benefits and alternatives were reviewed prior to the procedure. FINDINGS:  No evidence of metastatic disease. Bilateral sentinel pelvic lymph nodes removed. Bladder intact at completion. PROCEDURE: The patient was taken to the operating, placed under general anesthesia, sterilely prepped and draped. Abernathy placed. Cervix injected at 3 and 9 o'clock with fluorescein green dye for sentinel lymph node identification. Uterine manipulator placed. Attention turned to the abdomen. Veress needle introduced supraumbilical with no pressures and a reassuring drop test.  Visiport placed at that site. Further trocars, two right, two left placed under direct visualization. Washings taken. Steep Trendelenburg achieved. Robot was docked. Some mild adhesions and diverticulosis were noted. This was corrected in the pelvis. The retroperitoneal spaces were then opened. The fluorescent camera was utilized to identify fluorescent sentinel pelvic lymph nodes bilaterally, which were removed preserving neurovascular and urologic structures. Hemostatic product was then placed. Windows were made between IPs and ureters. The IPs were grasped with bipolar, cauterized, and transected. The anterior cul-de-sac was incised. Bladder flap was created sharply below the external cervical manipulator. The uterine vessels were skeletonized bilaterally. They were grasped at the level of the internal cervical os, cauterized, and transected. This was continued to the level of the external cervical os. Anterior colpotomy was made. Circumferential incision was made. Specimen was released and removed vaginally intact. The vaginal apex was closed with a running locked self-locking suture in two layers. The bladder was filled. No extravasation noted. Abernathy was removed. Irrigation was performed and hemostasis confirmed. Hemostatic product was placed over the denuded pelvis. The robot was undocked. The left lateral trocar site was closed under direct visualization using a laparoscopic closure device. Remainder of the trocars were removed after insufflation was allowed to resolve. Subcuticulars and Dermabond were placed. Sponge, lap and needle counts were correct. The patient tolerated the procedure well, was taken to PACU stable per Anesthesia.         Janene Young MD      [id number]  D:  08/09/2023 8:09  T:  08/09/2023 12:36  JOB #:  [id number]

## 2023-08-09 NOTE — PLAN OF CARE
Problem: Chronic Conditions and Co-morbidities  Goal: Patient's chronic conditions and co-morbidity symptoms are monitored and maintained or improved  Outcome: Adequate for Discharge     Problem: Pain  Goal: Verbalizes/displays adequate comfort level or baseline comfort level  Outcome: Adequate for Discharge     Problem: Safety - Adult  Goal: Free from fall injury  Outcome: Adequate for Discharge     Problem: Neurosensory - Adult  Goal: Achieves stable or improved neurological status  Outcome: Adequate for Discharge  Goal: Achieves maximal functionality and self care  Outcome: Adequate for Discharge     Problem: Respiratory - Adult  Goal: Achieves optimal ventilation and oxygenation  Outcome: Adequate for Discharge     Problem: Cardiovascular - Adult  Goal: Maintains optimal cardiac output and hemodynamic stability  Outcome: Adequate for Discharge  Goal: Absence of cardiac dysrhythmias or at baseline  Outcome: Adequate for Discharge     Problem: Skin/Tissue Integrity - Adult  Goal: Skin integrity remains intact  Outcome: Adequate for Discharge  Goal: Incisions, wounds, or drain sites healing without S/S of infection  Outcome: Adequate for Discharge  Goal: Oral mucous membranes remain intact  Outcome: Adequate for Discharge     Problem: Musculoskeletal - Adult  Goal: Return mobility to safest level of function  Outcome: Adequate for Discharge  Goal: Maintain proper alignment of affected body part  Outcome: Adequate for Discharge  Goal: Return ADL status to a safe level of function  Outcome: Adequate for Discharge     Problem: Gastrointestinal - Adult  Goal: Minimal or absence of nausea and vomiting  Outcome: Adequate for Discharge  Goal: Maintains or returns to baseline bowel function  Outcome: Adequate for Discharge  Goal: Maintains adequate nutritional intake  Outcome: Adequate for Discharge  Goal: Establish and maintain optimal ostomy function  Outcome: Adequate for Discharge     Problem: Genitourinary -

## 2023-08-09 NOTE — DISCHARGE SUMMARY
Date: 08/09/2023  Value: 3.9         Ref range: 3.5 - 5.1 mmol/L   Status: Final  Chloride                                      Date: 08/09/2023  Value: 113 (H)     Ref range: 101 - 110 mmol/L   Status: Final  CO2                                           Date: 08/09/2023  Value: 24          Ref range: 21 - 32 mmol/L     Status: Final  Anion Gap                                     Date: 08/09/2023  Value: 5           Ref range: 2 - 11 mmol/L      Status: Final  Glucose                                       Date: 08/09/2023  Value: 123 (H)     Ref range: 65 - 100 mg/dL     Status: Final  BUN                                           Date: 08/09/2023  Value: 15          Ref range: 8 - 23 MG/DL       Status: Final  Creatinine                                    Date: 08/09/2023  Value: 1.00        Ref range: 0.6 - 1.0 MG/DL    Status: Final  Est, Glom Filt Rate                           Date: 08/09/2023  Value: 56 (L)      Ref range: >60 ml/min/1.73m2  Status: Final                Comment:    Pediatric calculator link: [email or web address]. [email or web address]_[email or web address]     These results are not intended for use in patients <25years of age. eGFR results are calculated without a race factor using  the 2021 CKD-EPI equation. Careful clinical correlation is recommended, particularly when comparing to results calculated using previous equations. The CKD-EPI equation is less accurate in patients with extremes of muscle mass, extra-renal metabolism of creatinine, excessive creatine ingestion, or following therapy that affects renal tubular secretion.     Calcium                                       Date: 08/09/2023  Value: 9.3         Ref range: 8.3 - 10.4 MG/DL   Status: Final  WBC                                           Date: 08/09/2023  Value: 19.1 (H)    Ref range: 4.3 - 11.1 K/uL    Status: Final  RBC                                           Date: 08/09/2023  Value: 4.55        Ref range: 4.05 - MG tablet  Take 1 tablet by mouth every 8 hours as needed for Pain  Qty: 15 tablet Refills: 0    ondansetron (ZOFRAN-ODT) 4 MG disintegrating tablet  Take 1 tablet by mouth every 8 hours as needed for Nausea or Vomiting  Qty: 20 tablet Refills: 0    sennosides-docusate sodium (SENOKOT-S) 8.6-50 MG tablet  Take 1 tablet by mouth daily as needed for Constipation  Qty: 30 tablet Refills: 0          Current Discharge Medication List        Current Discharge Medication List    CONTINUE these medications which have NOT CHANGED    metoprolol succinate (TOPROL XL) 100 MG extended release tablet  TAKE 1 TABLET BY MOUTH EVERY DAY  Qty: 90 tablet Refills: 1    metFORMIN (GLUCOPHAGE-XR) 500 MG extended release tablet  TAKE 1 TABLET BY MOUTH EVERY DAY  Qty: 90 tablet Refills: 0  Associated Diagnoses:Type 2 diabetes mellitus with stage 3a chronic kidney disease, without long-term current use of insulin ([facility])    furosemide (LASIX) 20 MG tablet  Take 1 tablet by mouth daily as needed (as needed)  Qty: 30 tablet Refills: 5    atorvastatin (LIPITOR) 40 MG tablet  Take 1 tablet by mouth daily  Qty: 90 tablet Refills: 3    levothyroxine (SYNTHROID) 50 MCG tablet  TAKE 1 TABLET BY MOUTH EVERY DAY BEFORE BREAKFAST  Qty: 90 tablet Refills: 1    ONE TOUCH ULTRASOFT LANCETS MISC  1 each by Does not apply route daily  Qty: 100 each Refills: 3  Associated Diagnoses:Type 2 diabetes mellitus with stage 3a chronic kidney disease, without long-term current use of insulin ([facility])    Blood Glucose Monitoring Suppl (ONE TOUCH ULTRA MINI) w/Device KIT  1 kit by Does not apply route daily as needed (high blood sugars)  Qty: 1 kit Refills: 0  Associated Diagnoses:Type 2 diabetes mellitus with stage 3a chronic kidney disease, without long-term current use of insulin ([facility])    nitroGLYCERIN (NITROSTAT) 0.4 MG SL tablet  Place 1 tablet under the tongue every 5 minutes as needed for Chest pain  Qty: 25 tablet Refills: 5    cyclobenzaprine (FLEXERIL) 5 MG

## 2023-08-10 ENCOUNTER — CARE COORDINATION (OUTPATIENT)
Dept: CARE COORDINATION | Facility: CLINIC | Age: 83
End: 2023-08-10

## 2023-08-10 ENCOUNTER — TELEPHONE (OUTPATIENT)
Dept: ONCOLOGY | Age: 83
End: 2023-08-10

## 2023-08-10 ASSESSMENT — PATIENT HEALTH QUESTIONNAIRE - PHQ9
2. FEELING DOWN, DEPRESSED OR HOPELESS: 0
2. FEELING DOWN, DEPRESSED OR HOPELESS: NOT AT ALL
1. LITTLE INTEREST OR PLEASURE IN DOING THINGS: 0
SUM OF ALL RESPONSES TO PHQ QUESTIONS 1-9: 0
SUM OF ALL RESPONSES TO PHQ QUESTIONS 1-9: 0
SUM OF ALL RESPONSES TO PHQ9 QUESTIONS 1 & 2: 0
SUM OF ALL RESPONSES TO PHQ QUESTIONS 1-9: 0
SUM OF ALL RESPONSES TO PHQ9 QUESTIONS 1 & 2: 0
1. LITTLE INTEREST OR PLEASURE IN DOING THINGS: NOT AT ALL
SUM OF ALL RESPONSES TO PHQ QUESTIONS 1-9: 0

## 2023-08-10 NOTE — TELEPHONE ENCOUNTER
Called pt to check on post-op. Per pt, some soreness but otherwise no complaints. Pt aware to call with any questions or concerns. Made aware of OV 8/18.

## 2023-08-12 ENCOUNTER — PATIENT MESSAGE (OUTPATIENT)
Age: 83
End: 2023-08-12

## 2023-08-14 NOTE — TELEPHONE ENCOUNTER
08/08/2023 Current Discharge Medication List    STOP taking these medications    apixaban (ELIQUIS) 5 MG TABS tablet  Comments:  Reason for Stopping:    losartan (COZAAR) 100 MG tablet  Comments:  Reason for Stopping:    blood glucose test strips (ASCENSIA AUTODISC VI;ONE TOUCH ULTRA TEST VI) strip  Comments:  Reason for Stopping:

## 2023-08-14 NOTE — TELEPHONE ENCOUNTER
From: Enma De Paz  To: Dr. Jay Vega: 8/12/2023 11:32 AM EDT  Subject: Gabriella Fairchild,    I stopped at [facility] this morning to inquire as to which office STOPPED the prescription Losarten. I was told it was [facility] and Dr. Gilda Roque office. I would like to know why that was done and. Sandro Norris .. Sandro Norris if you are going to renew the prescription or prescribe another one. By the way - the surgery went well with no issues at all. I am blessed this was discovered early.      See you in [facility]

## 2023-08-15 ENCOUNTER — CARE COORDINATION (OUTPATIENT)
Dept: CARE COORDINATION | Facility: CLINIC | Age: 83
End: 2023-08-15

## 2023-08-15 RX ORDER — LOSARTAN POTASSIUM 100 MG/1
100 TABLET ORAL DAILY
Qty: 90 TABLET | Refills: 1 | Status: SHIPPED | OUTPATIENT
Start: 2023-08-15

## 2023-08-15 NOTE — PROGRESS NOTES
Pov  No issues since surgery    Path reviewd, no adj recommended    Incs cdi, abd soft, vss    Survllience reviewd, low risk recurrence dsicussed    Start paps 4 months    sms/8/10/2023         Sign Out Date: 8/10/2023  Jose Miguel Mckeon MD       Microscopic Description        A  B:     Surgical Pathology Cancer Case Summary   CLINICAL   SPECIMEN        PROCEDURE:   Total hysterectomy and bilateral salpingo-oophorectomy   TUMOR        HISTOLOGIC TYPE:   Endometrioid carcinoma, NOS   HISTOLOGIC GRADE:   FIGO grade 1   MYOMETRIAL INVASION:   Present        DEPTH OF MYOMETRIAL INVASION:   7 mm   MYOMETRIAL THICKNESS:   15 mm   PERCENTAGE OF MYOMETRIAL INVASION:   Estimated to be less than 50%        UTERINE SEROSA INVOLVEMENT:   Not identified   CERVICAL STROMAL INVOLVEMENT:   Not identified   OTHER TISSUE / ORGAN INVOLVEMENT:   Not applicable   PERITONEAL / ASCITIC FLUID:   Malignant cells not identified   LYMPHATIC AND / OR VASCULAR INVASION:   Not identified   MARGINS        MARGIN STATUS:   All margins negative for invasive carcinoma   REGIONAL LYMPH NODES        REGIONAL LYMPH NODE STATUS:   Regional lymph nodes present        REGIONAL LYMPH NODE STATUS:   All regional lymph nodes negative for   tumor cells   Lymph Nodes Examined        TOTAL NUMBER OF PELVIC NODES EXAMINED:   1   NUMBER OF PELVIC SENTINEL NODES EXAMINED:   1   TOTAL NUMBER OF PARA-AORTIC NODES EXAMINED:   0   DISTANT METASTASIS        DISTANT SITE(s) INVOLVED:   Not applicable   pTNM CLASSIFICATION (AJCC 8th Edition)        PT CATEGORY:   pT1a

## 2023-08-15 NOTE — CARE COORDINATION
[facility] Care Transitions Follow Up Call    Patient Current Location:  Home: [facility] [address] [place] Coordinator contacted the patient by telephone to follow up after admission on 2023. Verified name and  with patient as identifiers. Patient: Sandy Conroy  Patient : 1940   MRN: [id number]  Reason for Admission:  Adenocarcinoma Endometrium /uterus  Discharge Date: 23 RARS: No data recorded    Needs to be reviewed by the provider   Additional needs identified to be addressed with provider: No  none             Method of communication with provider: none. Spoke with patient states fine. Patient denies any concerns during this phone call. Patient states will attend PCP follow up on 2023 and Oncology on 2023. Addressed changes since last contact:  none  Discussed follow-up appointments. If no appointment was previously scheduled, appointment scheduling offered: Yes   Is follow up appointment scheduled within 7 days of discharge? No.    Follow Up  Future Appointments   Date Time Provider [address] [place] [address]   2023 12:40 PM Luke Borden MD [facility] AMB   2023  8:00 AM Da Brothers MD [facility] AMB   11/10/2023 11:30 AM Yazmin Clemente MD [facility] AMB     Non-[facility] follow up appointment(s):     LPN Care Coordinator reviewed medical action plan with patient and discussed any barriers to care and/or understanding of plan of care after discharge. Discussed appropriate site of care based on symptoms and resources available to patient including: PCP  Specialist  When to call 911. The patient agrees to contact the PCP office for questions related to their healthcare. Advance Care Planning:   decision maker updated.      Patients top risk factors for readmission: medical condition-Adenocarcinoma of uterus  Interventions to address risk factors: Assessment and support for treatment adherence and medication management-

## 2023-08-17 ENCOUNTER — OFFICE VISIT (OUTPATIENT)
Dept: INTERNAL MEDICINE CLINIC | Facility: CLINIC | Age: 83
End: 2023-08-17
Payer: MEDICARE

## 2023-08-17 VITALS
WEIGHT: 155.2 LBS | SYSTOLIC BLOOD PRESSURE: 116 MMHG | BODY MASS INDEX: 25.86 KG/M2 | DIASTOLIC BLOOD PRESSURE: 62 MMHG | OXYGEN SATURATION: 94 % | HEART RATE: 63 BPM | HEIGHT: 65 IN

## 2023-08-17 DIAGNOSIS — E11.22 TYPE 2 DIABETES MELLITUS WITH STAGE 3A CHRONIC KIDNEY DISEASE, WITHOUT LONG-TERM CURRENT USE OF INSULIN (HCC): Primary | ICD-10-CM

## 2023-08-17 DIAGNOSIS — I10 PRIMARY HYPERTENSION: ICD-10-CM

## 2023-08-17 DIAGNOSIS — H91.90 SUBJECTIVE HEARING CHANGE: ICD-10-CM

## 2023-08-17 DIAGNOSIS — N18.31 TYPE 2 DIABETES MELLITUS WITH STAGE 3A CHRONIC KIDNEY DISEASE, WITHOUT LONG-TERM CURRENT USE OF INSULIN (HCC): Primary | ICD-10-CM

## 2023-08-17 PROCEDURE — G8417 CALC BMI ABV UP PARAM F/U: HCPCS | Performed by: INTERNAL MEDICINE

## 2023-08-17 PROCEDURE — G8427 DOCREV CUR MEDS BY ELIG CLIN: HCPCS | Performed by: INTERNAL MEDICINE

## 2023-08-17 PROCEDURE — 1123F ACP DISCUSS/DSCN MKR DOCD: CPT | Performed by: INTERNAL MEDICINE

## 2023-08-17 PROCEDURE — 3044F HG A1C LEVEL LT 7.0%: CPT | Performed by: INTERNAL MEDICINE

## 2023-08-17 PROCEDURE — G8400 PT W/DXA NO RESULTS DOC: HCPCS | Performed by: INTERNAL MEDICINE

## 2023-08-17 PROCEDURE — 1036F TOBACCO NON-USER: CPT | Performed by: INTERNAL MEDICINE

## 2023-08-17 PROCEDURE — 1090F PRES/ABSN URINE INCON ASSESS: CPT | Performed by: INTERNAL MEDICINE

## 2023-08-17 PROCEDURE — 3078F DIAST BP <80 MM HG: CPT | Performed by: INTERNAL MEDICINE

## 2023-08-17 PROCEDURE — 3074F SYST BP LT 130 MM HG: CPT | Performed by: INTERNAL MEDICINE

## 2023-08-17 PROCEDURE — 99214 OFFICE O/P EST MOD 30 MIN: CPT | Performed by: INTERNAL MEDICINE

## 2023-08-17 ASSESSMENT — ENCOUNTER SYMPTOMS
SHORTNESS OF BREATH: 0
CHEST TIGHTNESS: 0
COUGH: 0
CONSTIPATION: 0

## 2023-08-17 NOTE — ASSESSMENT & PLAN NOTE
At goal, continue current medications   Hemoglobin A1C   Date Value Ref Range Status   04/07/2023 6.0 (H) 4.8 - 5.6 % Final     Key Antihyperglycemic Medications          metFORMIN (GLUCOPHAGE-XR) 500 MG extended release tablet (Taking)    Sig: TAKE 1 TABLET BY MOUTH EVERY DAY    Patient taking differently: Daily with supper    Renewals     Renewal provider: Jennifer Campbell MD

## 2023-08-17 NOTE — ASSESSMENT & PLAN NOTE
At goal, continue current medications, medication adherence emphasized and lifestyle modifications recommended   Key Anti-Hypertensive Meds          losartan (COZAAR) 100 MG tablet (Taking)    Sig - Route: Take 1 tablet by mouth daily - Oral    metoprolol succinate (TOPROL XL) 100 MG extended release tablet (Taking)    Sig: TAKE 1 TABLET BY MOUTH EVERY DAY    furosemide (LASIX) 20 MG tablet (Taking)    Sig - Route:  Take 1 tablet by mouth daily as needed (as needed) - Oral 175

## 2023-08-17 NOTE — PROGRESS NOTES
Chief Complaint   Patient presents with    Follow-up     4 month f/u. HTN, DM        Juanis Finn is a 79 y.o. female who presents today for Follow-up (4 month f/u. HTN, DM)     Is here for follow-up in chronic conditions including hypertension, diabetes, since last visit with me, she was diagnosed with adenocarcinoma of endometrium  she had surgery then in August 8, she is doing well, and has her postop follow-up tomorrow    She reports taking her medications as prescribed, there has been some confusion in regards to losartan, which she is still taking, and tolerating without side effects, it seems the medication was stopped during surgery postoperative. She is overall feeling well, she wants to restart her routine of exercise, playing tennis, feels very appreciated in regards to all the care given by staff and providers during all of these processes of the endometrial cancer    Hemoglobin A1C   Date Value Ref Range Status   04/07/2023 6.0 (H) 4.8 - 5.6 % Final         Wt Readings from Last 3 Encounters:   08/17/23 155 lb 3.2 oz (70.4 kg)   08/08/23 154 lb 3 oz (69.9 kg)   08/02/23 155 lb 9.6 oz (70.6 kg)     Vitals:    08/17/23 1234   BP: 116/62   Site: Left Upper Arm   Position: Sitting   Pulse: 63   SpO2: 94%   Weight: 155 lb 3.2 oz (70.4 kg)   Height: 5' 5\" (1.651 m)        Assessment and plan:  1.  Type 2 diabetes mellitus with stage 3a chronic kidney disease, without long-term current use of insulin ([facility])  Assessment & Plan:   At goal, continue current medications   Hemoglobin A1C   Date Value Ref Range Status   04/07/2023 6.0 (H) 4.8 - 5.6 % Final     Key Antihyperglycemic Medications            metFORMIN (GLUCOPHAGE-XR) 500 MG extended release tablet (Taking)    Sig: TAKE 1 TABLET BY MOUTH EVERY DAY    Patient taking differently: Daily with supper    Renewals       Renewal provider: Jamil Mckinnon MD                    Orders:  -     Hemoglobin A1C; Future  -     Comprehensive Metabolic

## 2023-08-18 ENCOUNTER — OFFICE VISIT (OUTPATIENT)
Dept: ONCOLOGY | Age: 83
End: 2023-08-18

## 2023-08-18 VITALS
TEMPERATURE: 98.9 F | DIASTOLIC BLOOD PRESSURE: 60 MMHG | OXYGEN SATURATION: 96 % | WEIGHT: 156.1 LBS | HEIGHT: 65 IN | HEART RATE: 65 BPM | BODY MASS INDEX: 26.01 KG/M2 | RESPIRATION RATE: 14 BRPM | SYSTOLIC BLOOD PRESSURE: 125 MMHG

## 2023-08-18 DIAGNOSIS — C54.1 ADENOCARCINOMA OF THE ENDOMETRIUM/UTERUS (HCC): Primary | ICD-10-CM

## 2023-08-18 PROCEDURE — 99024 POSTOP FOLLOW-UP VISIT: CPT | Performed by: OBSTETRICS & GYNECOLOGY

## 2023-08-18 ASSESSMENT — PATIENT HEALTH QUESTIONNAIRE - PHQ9
SUM OF ALL RESPONSES TO PHQ9 QUESTIONS 1 & 2: 0
SUM OF ALL RESPONSES TO PHQ QUESTIONS 1-9: 0
SUM OF ALL RESPONSES TO PHQ QUESTIONS 1-9: 0
2. FEELING DOWN, DEPRESSED OR HOPELESS: 0
1. LITTLE INTEREST OR PLEASURE IN DOING THINGS: 0
SUM OF ALL RESPONSES TO PHQ QUESTIONS 1-9: 0
SUM OF ALL RESPONSES TO PHQ QUESTIONS 1-9: 0

## 2023-08-18 NOTE — PATIENT INSTRUCTIONS
Patient Instructions from Today's Visit    Reason for Visit:  Vianey Sweeney op visit    Diagnosis Information:  [email or web address]      Plan:    The pathology did show a low grade endometrial cancer grade 1A. There is one more test we are sending off on the tumor. If this comes back positive, we may recommend you to see genetics. Follow Up:  - 4 months with a pap smear    Recent Lab Results:n/a      Treatment Summary has been discussed and given to patient: n/a        -------------------------------------------------------------------------------------------------------------------    Patient did express an interest in My Chart. My Chart log in information explained on the after visit summary printout at the [facility] desk.     Magdaleno Dailey RN

## 2023-08-18 NOTE — PROGRESS NOTES
Pathology specimen to lab order for MSI testing faxed to pathology lab with confirmation of receipt.

## 2023-08-21 ENCOUNTER — CARE COORDINATION (OUTPATIENT)
Dept: CARE COORDINATION | Facility: CLINIC | Age: 83
End: 2023-08-21

## 2023-08-21 NOTE — CARE COORDINATION
Care Transitions Outreach Attempt    Call within 2 business days of discharge: Yes   Attempted to reach patient for transitions of care follow up. Unable to reach patient. Will re-open if phone call is returned. Episode Resolved. Patient: Willow Godwin Patient : 1940 MRN: [id number]    Last Discharge [address]       Date Complaint Diagnosis Description Type Department Provider    23  Endometrial cancer ([address] ) . .. Admission (Discharged) [id number] Loni Izaguirre MD              Was this an external facility discharge?  No Discharge Facility: [facility]    Noted following upcoming appointments from discharge chart review:   [facility] follow up appointment(s):   Future Appointments   Date Time Provider [address]   [facility]   11/10/2023 11:30 AM MD SONG Law [facility] AMB   2023 11:00 AM Sher Santos [facility] AMB   2023 11:00 [facility]   2023  5:50 AM [facility]   2023  9:30 AM Loni Izaguirre MD [facility] AMB     Non-[facility] follow up appointment(s): n/a

## 2023-08-31 DIAGNOSIS — N18.31 TYPE 2 DIABETES MELLITUS WITH STAGE 3A CHRONIC KIDNEY DISEASE, WITHOUT LONG-TERM CURRENT USE OF INSULIN (HCC): ICD-10-CM

## 2023-08-31 DIAGNOSIS — E11.22 TYPE 2 DIABETES MELLITUS WITH STAGE 3A CHRONIC KIDNEY DISEASE, WITHOUT LONG-TERM CURRENT USE OF INSULIN (HCC): ICD-10-CM

## 2023-08-31 RX ORDER — METFORMIN HYDROCHLORIDE 500 MG/1
TABLET, EXTENDED RELEASE ORAL
Qty: 90 TABLET | Refills: 1 | Status: SHIPPED | OUTPATIENT
Start: 2023-08-31

## 2023-08-31 NOTE — TELEPHONE ENCOUNTER
Requested Prescriptions     Pending Prescriptions Disp Refills    metFORMIN (GLUCOPHAGE-XR) 500 MG extended release tablet [Pharmacy Med Name: METFORMIN HCL  MG TABLET] 90 tablet 1     Sig: TAKE 1 TABLET BY MOUTH EVERY DAY

## 2023-09-11 RX ORDER — LEVOTHYROXINE SODIUM 0.05 MG/1
TABLET ORAL
Qty: 90 TABLET | Refills: 1 | Status: SHIPPED | OUTPATIENT
Start: 2023-09-11

## 2023-09-15 DIAGNOSIS — N18.31 TYPE 2 DIABETES MELLITUS WITH STAGE 3A CHRONIC KIDNEY DISEASE, WITHOUT LONG-TERM CURRENT USE OF INSULIN (HCC): ICD-10-CM

## 2023-09-15 DIAGNOSIS — E11.22 TYPE 2 DIABETES MELLITUS WITH STAGE 3A CHRONIC KIDNEY DISEASE, WITHOUT LONG-TERM CURRENT USE OF INSULIN (HCC): ICD-10-CM

## 2023-09-15 LAB
ALBUMIN SERPL-MCNC: 3.8 G/DL (ref 3.2–4.6)
ALBUMIN/GLOB SERPL: 1.2 (ref 0.4–1.6)
ALP SERPL-CCNC: 106 U/L (ref 50–136)
ALT SERPL-CCNC: 15 U/L (ref 12–65)
ANION GAP SERPL CALC-SCNC: 7 MMOL/L (ref 2–11)
AST SERPL-CCNC: 17 U/L (ref 15–37)
BILIRUB SERPL-MCNC: 0.2 MG/DL (ref 0.2–1.1)
BUN SERPL-MCNC: 20 MG/DL (ref 8–23)
CALCIUM SERPL-MCNC: 9.7 MG/DL (ref 8.3–10.4)
CHLORIDE SERPL-SCNC: 114 MMOL/L (ref 101–110)
CO2 SERPL-SCNC: 25 MMOL/L (ref 21–32)
CREAT SERPL-MCNC: 1.1 MG/DL (ref 0.6–1)
EST. AVERAGE GLUCOSE BLD GHB EST-MCNC: 128 MG/DL
GLOBULIN SER CALC-MCNC: 3.2 G/DL (ref 2.8–4.5)
GLUCOSE SERPL-MCNC: 103 MG/DL (ref 65–100)
HBA1C MFR BLD: 6.1 % (ref 4.8–5.6)
POTASSIUM SERPL-SCNC: 4.1 MMOL/L (ref 3.5–5.1)
PROT SERPL-MCNC: 7 G/DL (ref 6.3–8.2)
SODIUM SERPL-SCNC: 146 MMOL/L (ref 133–143)

## 2023-09-18 ENCOUNTER — OFFICE VISIT (OUTPATIENT)
Dept: INTERNAL MEDICINE CLINIC | Facility: CLINIC | Age: 83
End: 2023-09-18
Payer: MEDICARE

## 2023-09-18 VITALS
WEIGHT: 152 LBS | HEIGHT: 65 IN | DIASTOLIC BLOOD PRESSURE: 78 MMHG | SYSTOLIC BLOOD PRESSURE: 108 MMHG | OXYGEN SATURATION: 97 % | BODY MASS INDEX: 25.33 KG/M2 | HEART RATE: 57 BPM

## 2023-09-18 DIAGNOSIS — E11.22 TYPE 2 DIABETES MELLITUS WITH STAGE 3A CHRONIC KIDNEY DISEASE, WITHOUT LONG-TERM CURRENT USE OF INSULIN (HCC): Primary | ICD-10-CM

## 2023-09-18 DIAGNOSIS — N18.31 TYPE 2 DIABETES MELLITUS WITH STAGE 3A CHRONIC KIDNEY DISEASE, WITHOUT LONG-TERM CURRENT USE OF INSULIN (HCC): Primary | ICD-10-CM

## 2023-09-18 DIAGNOSIS — E03.9 ACQUIRED HYPOTHYROIDISM: ICD-10-CM

## 2023-09-18 DIAGNOSIS — I48.0 PAROXYSMAL ATRIAL FIBRILLATION (HCC): ICD-10-CM

## 2023-09-18 DIAGNOSIS — I10 PRIMARY HYPERTENSION: ICD-10-CM

## 2023-09-18 PROBLEM — D68.69 SECONDARY HYPERCOAGULABLE STATE (HCC): Status: RESOLVED | Noted: 2023-09-18 | Resolved: 2023-09-18

## 2023-09-18 PROBLEM — D68.69 SECONDARY HYPERCOAGULABLE STATE (HCC): Status: ACTIVE | Noted: 2023-09-18

## 2023-09-18 PROCEDURE — 1123F ACP DISCUSS/DSCN MKR DOCD: CPT | Performed by: INTERNAL MEDICINE

## 2023-09-18 PROCEDURE — 99214 OFFICE O/P EST MOD 30 MIN: CPT | Performed by: INTERNAL MEDICINE

## 2023-09-18 PROCEDURE — 1090F PRES/ABSN URINE INCON ASSESS: CPT | Performed by: INTERNAL MEDICINE

## 2023-09-18 PROCEDURE — 3044F HG A1C LEVEL LT 7.0%: CPT | Performed by: INTERNAL MEDICINE

## 2023-09-18 PROCEDURE — 3078F DIAST BP <80 MM HG: CPT | Performed by: INTERNAL MEDICINE

## 2023-09-18 PROCEDURE — G8427 DOCREV CUR MEDS BY ELIG CLIN: HCPCS | Performed by: INTERNAL MEDICINE

## 2023-09-18 PROCEDURE — 3074F SYST BP LT 130 MM HG: CPT | Performed by: INTERNAL MEDICINE

## 2023-09-18 PROCEDURE — 1036F TOBACCO NON-USER: CPT | Performed by: INTERNAL MEDICINE

## 2023-09-18 PROCEDURE — G8400 PT W/DXA NO RESULTS DOC: HCPCS | Performed by: INTERNAL MEDICINE

## 2023-09-18 PROCEDURE — G8417 CALC BMI ABV UP PARAM F/U: HCPCS | Performed by: INTERNAL MEDICINE

## 2023-09-18 ASSESSMENT — ENCOUNTER SYMPTOMS
SHORTNESS OF BREATH: 0
ABDOMINAL PAIN: 0
ABDOMINAL DISTENTION: 0
COUGH: 0
CONSTIPATION: 0
CHEST TIGHTNESS: 0
BACK PAIN: 0

## 2023-09-18 NOTE — PROGRESS NOTES
(SYNTHROID) 50 MCG tablet, TAKE 1 TABLET BY MOUTH EVERY DAY BEFORE BREAKFAST, Disp: 90 tablet, Rfl: 1    metFORMIN (GLUCOPHAGE-XR) 500 MG extended release tablet, TAKE 1 TABLET BY MOUTH EVERY DAY, Disp: 90 tablet, Rfl: 1    losartan (COZAAR) 100 MG tablet, Take 1 tablet by mouth daily, Disp: 90 tablet, Rfl: 1    apixaban (ELIQUIS) 5 MG TABS tablet, Take 1 tablet by mouth 2 times daily, Disp: 180 tablet, Rfl: 1    metoprolol succinate (TOPROL XL) 100 MG extended release tablet, TAKE 1 TABLET BY MOUTH EVERY DAY, Disp: 90 tablet, Rfl: 1    furosemide (LASIX) 20 MG tablet, Take 1 tablet by mouth daily as needed (as needed), Disp: 30 tablet, Rfl: 5    atorvastatin (LIPITOR) 40 MG tablet, Take 1 tablet by mouth daily (Patient taking differently: Take 1 tablet by mouth nightly), Disp: 90 tablet, Rfl: 3    ONE TOUCH ULTRASOFT LANCETS MISC, 1 each by Does not apply route daily, Disp: 100 each, Rfl: 3    Blood Glucose Monitoring Suppl (ONE TOUCH ULTRA MINI) w/Device KIT, 1 kit by Does not apply route daily as needed (high blood sugars), Disp: 1 kit, Rfl: 0    nitroGLYCERIN (NITROSTAT) 0.4 MG SL tablet, Place 1 tablet under the tongue every 5 minutes as needed for Chest pain, Disp: 25 tablet, Rfl: 5    cyclobenzaprine (FLEXERIL) 5 MG tablet, Take 1 tablet by mouth daily as needed, Disp: , Rfl:       Family history:    Family History   Problem Relation Age of Onset    Heart Disease Mother     Diabetes Mother     Hypertension Mother     Heart Disease Father         CHF    Diabetes Father     Cancer Sister         lung cancer    Diabetes Maternal Grandfather         Past medical history:    Past Medical History:   Diagnosis Date    Acne rosacea     Cancer ([address] [facility])     skin cancer    CKD (chronic kidney disease) stage 3, GFR 30-59 ml/min ([facility]) 4/12/2013    Coronary atherosclerosis of native coronary artery 04/12/2013 4/16/13 (Dr. Kal Jean) Coronary artery bypass grafting x4. Grafts consisting  of:  1.  Left internal mammary artery

## 2023-10-12 ENCOUNTER — TELEPHONE (OUTPATIENT)
Dept: ONCOLOGY | Age: 83
End: 2023-10-12

## 2023-10-12 ENCOUNTER — TELEPHONE (OUTPATIENT)
Dept: OBGYN CLINIC | Age: 83
End: 2023-10-12

## 2023-10-12 NOTE — TELEPHONE ENCOUNTER
Pt.had surgery with Dr Pau Hackett and was told she could have rehecks in 4 months for repeat pap. She wants to see Pedrito since she's the one that found the cancer. Is it ok to schedule with Pedrito or does she need to see Dr Pau Hackett?

## 2023-11-04 DIAGNOSIS — E11.22 TYPE 2 DIABETES MELLITUS WITH STAGE 3A CHRONIC KIDNEY DISEASE, WITHOUT LONG-TERM CURRENT USE OF INSULIN (HCC): ICD-10-CM

## 2023-11-04 DIAGNOSIS — N18.31 TYPE 2 DIABETES MELLITUS WITH STAGE 3A CHRONIC KIDNEY DISEASE, WITHOUT LONG-TERM CURRENT USE OF INSULIN (HCC): ICD-10-CM

## 2023-11-04 RX ORDER — METFORMIN HYDROCHLORIDE 500 MG/1
500 TABLET, EXTENDED RELEASE ORAL DAILY
Qty: 90 TABLET | Refills: 1 | Status: CANCELLED | OUTPATIENT
Start: 2023-11-04

## 2023-11-06 DIAGNOSIS — N18.31 TYPE 2 DIABETES MELLITUS WITH STAGE 3A CHRONIC KIDNEY DISEASE, WITHOUT LONG-TERM CURRENT USE OF INSULIN (HCC): ICD-10-CM

## 2023-11-06 DIAGNOSIS — E11.22 TYPE 2 DIABETES MELLITUS WITH STAGE 3A CHRONIC KIDNEY DISEASE, WITHOUT LONG-TERM CURRENT USE OF INSULIN (HCC): ICD-10-CM

## 2023-11-06 RX ORDER — METFORMIN HYDROCHLORIDE 500 MG/1
500 TABLET, EXTENDED RELEASE ORAL DAILY
Qty: 90 TABLET | Refills: 1 | Status: SHIPPED | OUTPATIENT
Start: 2023-11-06

## 2023-11-06 NOTE — TELEPHONE ENCOUNTER
Requested Prescriptions     Pending Prescriptions Disp Refills    metFORMIN (GLUCOPHAGE-XR) 500 MG extended release tablet 90 tablet 1     Sig: Take 1 tablet by mouth daily

## 2023-11-07 ENCOUNTER — OFFICE VISIT (OUTPATIENT)
Dept: ORTHOPEDIC SURGERY | Age: 83
End: 2023-11-07

## 2023-11-07 DIAGNOSIS — M25.551 RIGHT HIP PAIN: Primary | ICD-10-CM

## 2023-11-07 NOTE — PROGRESS NOTES
lateral femoral cutaneous, superior gluteal, inferior gluteal, and sciatic nerve, risk of dislocation, fracture both intra-op and post-op, limb length inequality, polyethylene wear, implant loosening, risk for continued pain, and risk for revision surgery secondary to but not limited to all of these discussed risks. Further we discussed risk of venous thrombo-embolism including deep vein thrombosis and pulmonary embolism despite being on prophylaxis. We have also previously discussed the potential of morbidity and mortality associated with, but not limited to, the actual surgical procedure, anesthesia, prior medical conditions, and/or the administration of medications perioperatively. I have made no guarantees to the patient regarding outcomes and the patient has voiced understanding of that. Finally orthopedic implant options were discussed with the patient and I explained my comfort and experience/training was really focused on a handful of implants. Ultimately the decision was made to proceed with using the implant that I had the most comfort/experience with using. The patient has been given the opportunity to ask questions all of which have been answered and the patient wishes to proceed.         Signed By: Boston Shelley MD  November 7, 2023

## 2023-11-08 ENCOUNTER — TELEPHONE (OUTPATIENT)
Age: 83
End: 2023-11-08

## 2023-11-08 DIAGNOSIS — M16.11 PRIMARY OSTEOARTHRITIS OF RIGHT HIP: Primary | ICD-10-CM

## 2023-11-08 NOTE — TELEPHONE ENCOUNTER
Physician's response faxed to [phone].         \"Low risk to hold Eliquis 48-72 hours prior and post.\"  Neli Pantoja MD

## 2023-11-08 NOTE — TELEPHONE ENCOUNTER
Patient having Right total Hip arthroplasty with dr. Cat Bennett on 11/22/23 under spinal. Requesting cardiac clearance and eliquis hold for 3 days prior.  Fax: [phone]

## 2023-11-08 NOTE — PROGRESS NOTES
05/27/2021     Lab Results   Component Value Date    TRIG 108 01/13/2023    TRIG 94 10/07/2022    TRIG 146 05/27/2021     Lab Results   Component Value Date    HDL 53 01/13/2023    HDL 48 10/07/2022    HDL 54 05/27/2021     Lab Results   Component Value Date    LDLCALC 62.4 01/13/2023    LDLCALC 61.2 10/07/2022    LDLCALC 78 05/27/2021     Lab Results   Component Value Date    LABVLDL 21.6 01/13/2023    LABVLDL 18.8 10/07/2022    VLDL 25 05/27/2021     Lab Results   Component Value Date    CHOLHDLRATIO 2.6 01/13/2023    CHOLHDLRATIO 2.7 10/07/2022       BMP  Lab Results   Component Value Date/Time     09/15/2023 07:43 AM    K 4.1 09/15/2023 07:43 AM     09/15/2023 07:43 AM    CO2 25 09/15/2023 07:43 AM    BUN 20 09/15/2023 07:43 AM    CREATININE 1.10 09/15/2023 07:43 AM    GLUCOSE 103 09/15/2023 07:43 AM    CALCIUM 9.7 09/15/2023 07:43 AM          EKG        CXR/IMAGING        DEVICE INTERROGATION        OUTSIDE RECORDS REVIEW    Records from outside providers have been reviewed and summarized as noted in the HPI, past history and data review sections of this note, and reviewed with patient. .       ASSESSMENT and PLAN    Edward Vasquez was seen today for paroxysmal atrial fibrillation . Diagnoses and all orders for this visit:    Atherosclerosis of native coronary artery of native heart with stable angina pectoris (HCC)    Paroxysmal atrial fibrillation (HCC)    Pre-op evaluation          IMPRESSION:    Low perioperative cardiovascular risk for hip replacement and to hold Eliquis 48-72 hours prior. Rate controlled, anticoagulated, continue meds    No angina, continue meds and lifestyle modification    BP at target, continue meds    Lipids at goal, continue meds        Return in about 6 months (around 5/10/2024). Thank you for allowing me to participate in this patient's care. Please call or contact me if there are any questions or concerns regarding the above.       ANNETTA HARPER,

## 2023-11-10 ENCOUNTER — PREP FOR PROCEDURE (OUTPATIENT)
Dept: ORTHOPEDIC SURGERY | Age: 83
End: 2023-11-10

## 2023-11-10 ENCOUNTER — OFFICE VISIT (OUTPATIENT)
Age: 83
End: 2023-11-10
Payer: MEDICARE

## 2023-11-10 VITALS
SYSTOLIC BLOOD PRESSURE: 138 MMHG | BODY MASS INDEX: 25.66 KG/M2 | HEART RATE: 60 BPM | WEIGHT: 150.3 LBS | DIASTOLIC BLOOD PRESSURE: 70 MMHG | HEIGHT: 64 IN

## 2023-11-10 DIAGNOSIS — Z01.818 PRE-OP EVALUATION: Primary | ICD-10-CM

## 2023-11-10 DIAGNOSIS — Z01.818 PRE-OP EVALUATION: ICD-10-CM

## 2023-11-10 DIAGNOSIS — I25.118 ATHEROSCLEROSIS OF NATIVE CORONARY ARTERY OF NATIVE HEART WITH STABLE ANGINA PECTORIS (HCC): Primary | ICD-10-CM

## 2023-11-10 DIAGNOSIS — I48.0 PAROXYSMAL ATRIAL FIBRILLATION (HCC): ICD-10-CM

## 2023-11-10 PROCEDURE — 99214 OFFICE O/P EST MOD 30 MIN: CPT | Performed by: INTERNAL MEDICINE

## 2023-11-10 PROCEDURE — G8484 FLU IMMUNIZE NO ADMIN: HCPCS | Performed by: INTERNAL MEDICINE

## 2023-11-10 PROCEDURE — 1123F ACP DISCUSS/DSCN MKR DOCD: CPT | Performed by: INTERNAL MEDICINE

## 2023-11-10 PROCEDURE — G8417 CALC BMI ABV UP PARAM F/U: HCPCS | Performed by: INTERNAL MEDICINE

## 2023-11-10 PROCEDURE — 1036F TOBACCO NON-USER: CPT | Performed by: INTERNAL MEDICINE

## 2023-11-10 PROCEDURE — 3075F SYST BP GE 130 - 139MM HG: CPT | Performed by: INTERNAL MEDICINE

## 2023-11-10 PROCEDURE — 1090F PRES/ABSN URINE INCON ASSESS: CPT | Performed by: INTERNAL MEDICINE

## 2023-11-10 PROCEDURE — 3078F DIAST BP <80 MM HG: CPT | Performed by: INTERNAL MEDICINE

## 2023-11-10 PROCEDURE — G8427 DOCREV CUR MEDS BY ELIG CLIN: HCPCS | Performed by: INTERNAL MEDICINE

## 2023-11-10 PROCEDURE — G8400 PT W/DXA NO RESULTS DOC: HCPCS | Performed by: INTERNAL MEDICINE

## 2023-11-10 RX ORDER — SODIUM CHLORIDE 0.9 % (FLUSH) 0.9 %
5-40 SYRINGE (ML) INJECTION PRN
Status: CANCELLED | OUTPATIENT
Start: 2023-11-10

## 2023-11-10 RX ORDER — SODIUM CHLORIDE 9 MG/ML
INJECTION, SOLUTION INTRAVENOUS PRN
Status: CANCELLED | OUTPATIENT
Start: 2023-11-10

## 2023-11-10 RX ORDER — SODIUM CHLORIDE 0.9 % (FLUSH) 0.9 %
5-40 SYRINGE (ML) INJECTION EVERY 12 HOURS SCHEDULED
Status: CANCELLED | OUTPATIENT
Start: 2023-11-10

## 2023-11-10 ASSESSMENT — ENCOUNTER SYMPTOMS: SHORTNESS OF BREATH: 0

## 2023-11-10 NOTE — PATIENT INSTRUCTIONS
and the flu. Get the flu vaccine every year. Get a pneumococcal vaccine. If you have had one before, ask your doctor whether you need another dose. Stay up to date on your COVID-19 vaccines. Activity    If your doctor recommends it, get more exercise. Walking is a good choice. Bit by bit, increase the amount you walk every day. Try for at least 30 minutes on most days of the week. You also may want to swim, bike, or do other activities. Your doctor may suggest that you join a cardiac rehabilitation program so that you can have help increasing your physical activity safely. Start light exercise if your doctor says it is okay. Even a small amount will help you get stronger, have more energy, and manage stress. Walking is an easy way to get exercise. Start out by walking a little more than you did in the hospital. Gradually increase the amount you walk. When you exercise, watch for signs that your heart is working too hard. You are pushing too hard if you cannot talk while you are exercising. If you become short of breath or dizzy or have chest pain, sit down and rest immediately. Check your pulse regularly. Place two fingers on the artery at the palm side of your wrist, in line with your thumb. If your heartbeat seems uneven or fast, talk to your doctor. When should you call for help? Call 911 anytime you think you may need emergency care. For example, call if:    You have symptoms of a heart attack. These may include:  Chest pain or pressure, or a strange feeling in the chest.  Sweating. Shortness of breath. Nausea or vomiting. Pain, pressure, or a strange feeling in the back, neck, jaw, or upper belly or in one or both shoulders or arms. Lightheadedness or sudden weakness. A fast or irregular heartbeat. After you call 911, the  may tell you to chew 1 adult-strength or 2 to 4 low-dose aspirin. Wait for an ambulance. Do not try to drive yourself. You have symptoms of a stroke.  These

## 2023-11-13 ENCOUNTER — HOSPITAL ENCOUNTER (OUTPATIENT)
Dept: SURGERY | Age: 83
Discharge: HOME | End: 2023-11-16
Payer: MEDICARE

## 2023-11-13 ENCOUNTER — HOSPITAL ENCOUNTER (OUTPATIENT)
Dept: REHABILITATION | Age: 83
Discharge: HOME | End: 2023-11-16
Payer: MEDICARE

## 2023-11-13 VITALS
BODY MASS INDEX: 25.52 KG/M2 | OXYGEN SATURATION: 94 % | DIASTOLIC BLOOD PRESSURE: 73 MMHG | TEMPERATURE: 97.3 F | HEIGHT: 65 IN | WEIGHT: 153.2 LBS | HEART RATE: 56 BPM | RESPIRATION RATE: 16 BRPM | SYSTOLIC BLOOD PRESSURE: 153 MMHG

## 2023-11-13 DIAGNOSIS — Z01.818 PRE-OP EVALUATION: ICD-10-CM

## 2023-11-13 DIAGNOSIS — G89.18 POSTOPERATIVE PAIN: Primary | ICD-10-CM

## 2023-11-13 LAB
ALBUMIN SERPL-MCNC: 3.8 G/DL (ref 3.2–4.6)
ANION GAP SERPL CALC-SCNC: 4 MMOL/L (ref 2–11)
BASOPHILS # BLD: 0 K/UL (ref 0–0.2)
BASOPHILS NFR BLD: 1 % (ref 0–2)
BUN SERPL-MCNC: 11 MG/DL (ref 8–23)
CALCIUM SERPL-MCNC: 9.5 MG/DL (ref 8.3–10.4)
CHLORIDE SERPL-SCNC: 112 MMOL/L (ref 101–110)
CO2 SERPL-SCNC: 27 MMOL/L (ref 21–32)
CREAT SERPL-MCNC: 1.05 MG/DL (ref 0.6–1)
DIFFERENTIAL METHOD BLD: NORMAL
EOSINOPHIL # BLD: 0.3 K/UL (ref 0–0.8)
EOSINOPHIL NFR BLD: 5 % (ref 0.5–7.8)
ERYTHROCYTE [DISTWIDTH] IN BLOOD BY AUTOMATED COUNT: 14.1 % (ref 11.9–14.6)
EST. AVERAGE GLUCOSE BLD GHB EST-MCNC: 128 MG/DL
GLUCOSE SERPL-MCNC: 111 MG/DL (ref 65–100)
HBA1C MFR BLD: 6.1 % (ref 4.8–5.6)
HCT VFR BLD AUTO: 44.1 % (ref 35.8–46.3)
HGB BLD-MCNC: 14.4 G/DL (ref 11.7–15.4)
IMM GRANULOCYTES # BLD AUTO: 0 K/UL (ref 0–0.5)
IMM GRANULOCYTES NFR BLD AUTO: 0 % (ref 0–5)
INR PPP: 1.4
LYMPHOCYTES # BLD: 1.2 K/UL (ref 0.5–4.6)
LYMPHOCYTES NFR BLD: 19 % (ref 13–44)
MCH RBC QN AUTO: 30 PG (ref 26.1–32.9)
MCHC RBC AUTO-ENTMCNC: 32.7 G/DL (ref 31.4–35)
MCV RBC AUTO: 91.9 FL (ref 82–102)
MONOCYTES # BLD: 0.5 K/UL (ref 0.1–1.3)
MONOCYTES NFR BLD: 8 % (ref 4–12)
MRSA DNA SPEC QL NAA+PROBE: NOT DETECTED
NEUTS SEG # BLD: 4.2 K/UL (ref 1.7–8.2)
NEUTS SEG NFR BLD: 67 % (ref 43–78)
NRBC # BLD: 0 K/UL (ref 0–0.2)
PLATELET # BLD AUTO: 285 K/UL (ref 150–450)
PMV BLD AUTO: 10.6 FL (ref 9.4–12.3)
POTASSIUM SERPL-SCNC: 3.9 MMOL/L (ref 3.5–5.1)
PROTHROMBIN TIME: 16.9 SEC (ref 12.6–14.3)
RBC # BLD AUTO: 4.8 M/UL (ref 4.05–5.2)
S AUREUS CPE NOSE QL NAA+PROBE: NOT DETECTED
SODIUM SERPL-SCNC: 143 MMOL/L (ref 133–143)
WBC # BLD AUTO: 6.2 K/UL (ref 4.3–11.1)

## 2023-11-13 PROCEDURE — 80307 DRUG TEST PRSMV CHEM ANLYZR: CPT

## 2023-11-13 PROCEDURE — 83036 HEMOGLOBIN GLYCOSYLATED A1C: CPT

## 2023-11-13 PROCEDURE — 82040 ASSAY OF SERUM ALBUMIN: CPT

## 2023-11-13 PROCEDURE — 94760 N-INVAS EAR/PLS OXIMETRY 1: CPT

## 2023-11-13 PROCEDURE — 97161 PT EVAL LOW COMPLEX 20 MIN: CPT

## 2023-11-13 PROCEDURE — 85610 PROTHROMBIN TIME: CPT

## 2023-11-13 PROCEDURE — 85025 COMPLETE CBC W/AUTO DIFF WBC: CPT

## 2023-11-13 PROCEDURE — 87641 MR-STAPH DNA AMP PROBE: CPT

## 2023-11-13 PROCEDURE — 80048 BASIC METABOLIC PNL TOTAL CA: CPT

## 2023-11-13 RX ORDER — OXYCODONE HYDROCHLORIDE 5 MG/1
10 TABLET ORAL EVERY 4 HOURS PRN
Qty: 60 TABLET | Refills: 0 | Status: SHIPPED | OUTPATIENT
Start: 2023-11-13 | End: 2023-11-20

## 2023-11-13 RX ORDER — OMEPRAZOLE 40 MG/1
40 CAPSULE, DELAYED RELEASE ORAL DAILY
Qty: 30 CAPSULE | Refills: 0 | Status: SHIPPED | OUTPATIENT
Start: 2023-11-13

## 2023-11-13 RX ORDER — SENNOSIDES A AND B 8.6 MG/1
1 TABLET, FILM COATED ORAL 2 TIMES DAILY
Qty: 30 TABLET | Refills: 2 | Status: SHIPPED | OUTPATIENT
Start: 2023-11-13

## 2023-11-13 RX ORDER — ONDANSETRON 4 MG/1
4 TABLET, FILM COATED ORAL 3 TIMES DAILY PRN
Qty: 30 TABLET | Refills: 1 | Status: SHIPPED | OUTPATIENT
Start: 2023-11-13

## 2023-11-13 RX ORDER — ACETAMINOPHEN 500 MG
1000 TABLET ORAL EVERY 6 HOURS PRN
Qty: 180 TABLET | Refills: 2 | Status: SHIPPED | OUTPATIENT
Start: 2023-11-13

## 2023-11-13 ASSESSMENT — PROMIS GLOBAL HEALTH SCALE
SUM OF RESPONSES TO QUESTIONS 3, 6, 7, & 8: 15
IN GENERAL, WOULD YOU SAY YOUR HEALTH IS...[ON A SCALE OF 1 (POOR) TO 5 (EXCELLENT)]: 4
IN GENERAL, HOW WOULD YOU RATE YOUR MENTAL HEALTH, INCLUDING YOUR MOOD AND YOUR ABILITY TO THINK [ON A SCALE OF 1 (POOR) TO 5 (EXCELLENT)]?: 5
HOW IS THE PROMIS V1.1 BEING ADMINISTERED?: 0
IN GENERAL, PLEASE RATE HOW WELL YOU CARRY OUT YOUR USUAL SOCIAL ACTIVITIES (INCLUDES ACTIVITIES AT HOME, AT WORK, AND IN YOUR COMMUNITY, AND RESPONSIBILITIES AS A PARENT, CHILD, SPOUSE, EMPLOYEE, FRIEND, ETC) [ON A SCALE OF 1 (POOR) TO 5 (EXCELLENT)]?: 4
WHO IS THE PERSON COMPLETING THE PROMIS V1.1 SURVEY?: 0
IN THE PAST 7 DAYS, HOW OFTEN HAVE YOU BEEN BOTHERED BY EMOTIONAL PROBLEMS, SUCH AS FEELING ANXIOUS, DEPRESSED, OR IRRITABLE [ON A SCALE FROM 1 (NEVER) TO 5 (ALWAYS)]?: 5
IN GENERAL, HOW WOULD YOU RATE YOUR SATISFACTION WITH YOUR SOCIAL ACTIVITIES AND RELATIONSHIPS [ON A SCALE OF 1 (POOR) TO 5 (EXCELLENT)]?: 5
TO WHAT EXTENT ARE YOU ABLE TO CARRY OUT YOUR EVERYDAY PHYSICAL ACTIVITIES SUCH AS WALKING, CLIMBING STAIRS, CARRYING GROCERIES, OR MOVING A CHAIR [ON A SCALE OF 1 (NOT AT ALL) TO 5 (COMPLETELY)]?: 4
IN GENERAL, WOULD YOU SAY YOUR QUALITY OF LIFE IS...[ON A SCALE OF 1 (POOR) TO 5 (EXCELLENT)]: 5
IN THE PAST 7 DAYS, HOW WOULD YOU RATE YOUR PAIN ON AVERAGE [ON A SCALE FROM 0 (NO PAIN) TO 10 (WORST IMAGINABLE PAIN)]?: 2
SUM OF RESPONSES TO QUESTIONS 2, 4, 5, & 10: 20
IN THE PAST 7 DAYS, HOW WOULD YOU RATE YOUR FATIGUE ON AVERAGE [ON A SCALE FROM 1 (NONE) TO 5 (VERY SEVERE)]?: 5
IN GENERAL, HOW WOULD YOU RATE YOUR PHYSICAL HEALTH [ON A SCALE OF 1 (POOR) TO 5 (EXCELLENT)]?: 4

## 2023-11-13 ASSESSMENT — HOOS JR
WALKING ON UNEVEN SURFACE: 1
HOOS JR TOTAL INTERVAL SCORE: 73.472
LYING IN BED (TURNING OVER, MAINTAINING HIP POSITION): 2
HOOS JR RAW SCORE: 5
GOING UP OR DOWN STAIRS: 0
BENDING TO THE FLOOR TO PICK UP OBJECT: 1
RISING FROM SITTING: 1
HOOS JR RAW SCORE: 5
SITTING: 0

## 2023-11-13 ASSESSMENT — PAIN DESCRIPTION - LOCATION: LOCATION: HIP

## 2023-11-13 ASSESSMENT — PAIN DESCRIPTION - DESCRIPTORS: DESCRIPTORS: ACHING

## 2023-11-13 ASSESSMENT — PAIN SCALES - GENERAL: PAINLEVEL_OUTOF10: 0

## 2023-11-13 NOTE — CARE COORDINATION
[facility] Case Management note:  Patient screened in Prehab for discharge planning needs. Patient scheduled for a future total joint replacement. We discussed Home Health and equipment needed after surgery. List of Home Health providers offered. Patient w/o preference towards provider. We will arrange [facility] on the day of surgery. Has a walker and plans to purchase a 3-1 BSC from [employer] and pick it up prior to surgery.

## 2023-11-13 NOTE — PROGRESS NOTES
Shraddha Brantley  : 1940  Primary: Medicare Part A And B  Secondary: [address] at [facility], [address]  [phone]      Physical Therapy Prehab Evaluation Summary:2023   Time In/Out   PT Charge Capture  Episode     MEDICAL/REFERRING DIAGNOSIS: Unilateral primary osteoarthritis, right hip [M16.11]  REFERRING PHYSICIAN: Venkatesh Matthews MD    Treatment Diagnosis:   Pain in Right Hip (M25.551)  Stiffness of Right Hip, Not elsewhere classified (M25.651)  Difficulty in walking, Not elsewhere classified (R26.2)    DATE OF SURGERY: 23  Assessment:   COMMENTS:  Ms. Bhargav Davis is present for a Prehab Physical Therapy Assessment for their upcoming right SHARON . They are here alone. After discussing the surgical admission options and discharge plans, they are planning on discharging on day of surgery. Pt. Plans to go home with  and has support also from 2 sons and families. She is active and plays tennis. PROBLEM LIST:   (Impacting functional limitations):  Ms. Bhargav Davis presents with the following lower extremity(s) problems:  Strength  Range of Motion  Home Exercise Program  Pain INTERVENTIONS PLANNED:   (Benefits and precautions of physical therapy have been discussed with the patient.)  Home Exercise Program  Educational Discussion       GOALS: (Goals have been discussed and agreed upon with patient.)  Discharge Goals: Time Frame: 1 Day  Patient will demonstrate independence with a home exercise program designed to increase strength, range of motion, and pain control to minimize functional deficits and optimize patient for total joint replacement.     Subjective:   Past Medical History/Comorbidities:   Ms. Bhargav Davis  has a past medical history of Acne rosacea, Cancer ([address]), CKD (chronic kidney disease) stage 3, GFR 30-59 ml/min ([address]), Coronary atherosclerosis of native coronary artery, Diabetes mellitus, type 2 ([address]),

## 2023-11-14 DIAGNOSIS — N18.31 TYPE 2 DIABETES MELLITUS WITH STAGE 3A CHRONIC KIDNEY DISEASE, WITHOUT LONG-TERM CURRENT USE OF INSULIN (HCC): Primary | ICD-10-CM

## 2023-11-14 DIAGNOSIS — E11.22 TYPE 2 DIABETES MELLITUS WITH STAGE 3A CHRONIC KIDNEY DISEASE, WITHOUT LONG-TERM CURRENT USE OF INSULIN (HCC): Primary | ICD-10-CM

## 2023-11-14 LAB
COMMENT:: NORMAL
COTININE UR QL SCN: NEGATIVE NG/ML

## 2023-11-14 RX ORDER — LANCETS 30 GAUGE
1 EACH MISCELLANEOUS DAILY
COMMUNITY
End: 2023-11-15 | Stop reason: SDUPTHER

## 2023-11-14 NOTE — TELEPHONE ENCOUNTER
Courtney from [facility] called wants to know if we can send them a generic prescription for lancets. Pt doesn't know which one she needs but will return to pharmacy and let Nona know. Requested Prescriptions     Pending Prescriptions Disp Refills    Lancets MISC 100 each 3     Si each by Does not apply route daily LANCETS OF PATIENT'S CHOICE.

## 2023-11-15 RX ORDER — LANCETS 30 GAUGE
1 EACH MISCELLANEOUS DAILY
Qty: 100 EACH | Refills: 3 | Status: SHIPPED | OUTPATIENT
Start: 2023-11-15

## 2023-11-21 ENCOUNTER — ANESTHESIA EVENT (OUTPATIENT)
Dept: SURGERY | Age: 83
End: 2023-11-21
Payer: MEDICARE

## 2023-11-21 RX ORDER — SODIUM CHLORIDE 0.9 % (FLUSH) 0.9 %
5-40 SYRINGE (ML) INJECTION EVERY 12 HOURS SCHEDULED
Status: CANCELLED | OUTPATIENT
Start: 2023-11-21

## 2023-11-21 RX ORDER — SODIUM CHLORIDE 0.9 % (FLUSH) 0.9 %
5-40 SYRINGE (ML) INJECTION PRN
Status: CANCELLED | OUTPATIENT
Start: 2023-11-21

## 2023-11-21 RX ORDER — SODIUM CHLORIDE 9 MG/ML
INJECTION, SOLUTION INTRAVENOUS PRN
Status: CANCELLED | OUTPATIENT
Start: 2023-11-21

## 2023-11-21 RX ORDER — MIDAZOLAM HYDROCHLORIDE 2 MG/2ML
2 INJECTION, SOLUTION INTRAMUSCULAR; INTRAVENOUS
Status: CANCELLED | OUTPATIENT
Start: 2023-11-21 | End: 2023-11-22

## 2023-11-21 RX ORDER — FENTANYL CITRATE 50 UG/ML
100 INJECTION, SOLUTION INTRAMUSCULAR; INTRAVENOUS
Status: CANCELLED | OUTPATIENT
Start: 2023-11-21 | End: 2023-11-22

## 2023-11-21 RX ORDER — FENTANYL CITRATE 50 UG/ML
25 INJECTION, SOLUTION INTRAMUSCULAR; INTRAVENOUS
Status: CANCELLED | OUTPATIENT
Start: 2023-11-21 | End: 2023-11-22

## 2023-11-21 RX ORDER — SODIUM CHLORIDE 9 MG/ML
INJECTION, SOLUTION INTRAVENOUS CONTINUOUS
Status: CANCELLED | OUTPATIENT
Start: 2023-11-21

## 2023-11-22 ENCOUNTER — APPOINTMENT (OUTPATIENT)
Dept: GENERAL RADIOLOGY | Age: 83
End: 2023-11-22
Attending: ORTHOPAEDIC SURGERY
Payer: MEDICARE

## 2023-11-22 ENCOUNTER — ANESTHESIA (OUTPATIENT)
Dept: SURGERY | Age: 83
End: 2023-11-22
Payer: MEDICARE

## 2023-11-22 ENCOUNTER — HOSPITAL ENCOUNTER (OUTPATIENT)
Age: 83
Discharge: HOME | End: 2023-11-22
Attending: ORTHOPAEDIC SURGERY | Admitting: ORTHOPAEDIC SURGERY
Payer: MEDICARE

## 2023-11-22 VITALS
HEART RATE: 54 BPM | WEIGHT: 153.3 LBS | DIASTOLIC BLOOD PRESSURE: 80 MMHG | HEIGHT: 65 IN | BODY MASS INDEX: 25.54 KG/M2 | RESPIRATION RATE: 15 BRPM | OXYGEN SATURATION: 96 % | TEMPERATURE: 97.3 F | SYSTOLIC BLOOD PRESSURE: 165 MMHG

## 2023-11-22 PROBLEM — M16.11 OSTEOARTHRITIS OF RIGHT HIP, UNSPECIFIED OSTEOARTHRITIS TYPE: Status: ACTIVE | Noted: 2023-11-22

## 2023-11-22 LAB
GLUCOSE BLD STRIP.AUTO-MCNC: 107 MG/DL (ref 65–100)
INR BLD: 1 (ref 0.9–1.2)
PT BLD: 12.2 SECS (ref 9.6–11.6)
SERVICE CMNT-IMP: ABNORMAL

## 2023-11-22 PROCEDURE — 97535 SELF CARE MNGMENT TRAINING: CPT

## 2023-11-22 PROCEDURE — 82962 GLUCOSE BLOOD TEST: CPT

## 2023-11-22 PROCEDURE — 97530 THERAPEUTIC ACTIVITIES: CPT

## 2023-11-22 PROCEDURE — 72170 X-RAY EXAM OF PELVIS: CPT

## 2023-11-22 PROCEDURE — 2500000003 HC RX 250 WO HCPCS: Performed by: ANESTHESIOLOGY

## 2023-11-22 PROCEDURE — 6360000002 HC RX W HCPCS: Performed by: ORTHOPAEDIC SURGERY

## 2023-11-22 PROCEDURE — 2500000003 HC RX 250 WO HCPCS: Performed by: NURSE ANESTHETIST, CERTIFIED REGISTERED

## 2023-11-22 PROCEDURE — 85610 PROTHROMBIN TIME: CPT

## 2023-11-22 PROCEDURE — 6360000002 HC RX W HCPCS: Performed by: NURSE PRACTITIONER

## 2023-11-22 PROCEDURE — 2580000003 HC RX 258: Performed by: NURSE ANESTHETIST, CERTIFIED REGISTERED

## 2023-11-22 PROCEDURE — 6370000000 HC RX 637 (ALT 250 FOR IP): Performed by: ANESTHESIOLOGY

## 2023-11-22 PROCEDURE — 6370000000 HC RX 637 (ALT 250 FOR IP): Performed by: NURSE PRACTITIONER

## 2023-11-22 PROCEDURE — 97161 PT EVAL LOW COMPLEX 20 MIN: CPT

## 2023-11-22 PROCEDURE — 6360000002 HC RX W HCPCS: Performed by: NURSE ANESTHETIST, CERTIFIED REGISTERED

## 2023-11-22 PROCEDURE — 2580000003 HC RX 258: Performed by: ANESTHESIOLOGY

## 2023-11-22 PROCEDURE — 6360000002 HC RX W HCPCS: Performed by: ANESTHESIOLOGY

## 2023-11-22 PROCEDURE — 97165 OT EVAL LOW COMPLEX 30 MIN: CPT

## 2023-11-22 RX ORDER — ALBUTEROL SULFATE 2.5 MG/3ML
2.5 SOLUTION RESPIRATORY (INHALATION) EVERY 6 HOURS PRN
Status: DISCONTINUED | OUTPATIENT
Start: 2023-11-22 | End: 2023-11-22 | Stop reason: HOSPADM

## 2023-11-22 RX ORDER — SODIUM CHLORIDE, SODIUM LACTATE, POTASSIUM CHLORIDE, CALCIUM CHLORIDE 600; 310; 30; 20 MG/100ML; MG/100ML; MG/100ML; MG/100ML
INJECTION, SOLUTION INTRAVENOUS CONTINUOUS
Status: DISCONTINUED | OUTPATIENT
Start: 2023-11-22 | End: 2023-11-22 | Stop reason: HOSPADM

## 2023-11-22 RX ORDER — SODIUM CHLORIDE 9 MG/ML
INJECTION, SOLUTION INTRAVENOUS PRN
Status: DISCONTINUED | OUTPATIENT
Start: 2023-11-22 | End: 2023-11-22 | Stop reason: HOSPADM

## 2023-11-22 RX ORDER — KETAMINE HYDROCHLORIDE 50 MG/ML
INJECTION, SOLUTION, CONCENTRATE INTRAMUSCULAR; INTRAVENOUS PRN
Status: DISCONTINUED | OUTPATIENT
Start: 2023-11-22 | End: 2023-11-22 | Stop reason: SDUPTHER

## 2023-11-22 RX ORDER — DEXTROSE MONOHYDRATE 100 MG/ML
INJECTION, SOLUTION INTRAVENOUS CONTINUOUS PRN
Status: DISCONTINUED | OUTPATIENT
Start: 2023-11-22 | End: 2023-11-22 | Stop reason: HOSPADM

## 2023-11-22 RX ORDER — HYDROMORPHONE HYDROCHLORIDE 1 MG/ML
1 INJECTION, SOLUTION INTRAMUSCULAR; INTRAVENOUS; SUBCUTANEOUS
Status: DISCONTINUED | OUTPATIENT
Start: 2023-11-22 | End: 2023-11-22 | Stop reason: HOSPADM

## 2023-11-22 RX ORDER — SODIUM CHLORIDE 9 MG/ML
INJECTION, SOLUTION INTRAVENOUS CONTINUOUS
Status: DISCONTINUED | OUTPATIENT
Start: 2023-11-22 | End: 2023-11-22 | Stop reason: HOSPADM

## 2023-11-22 RX ORDER — ATORVASTATIN CALCIUM 40 MG/1
40 TABLET, FILM COATED ORAL NIGHTLY
Status: DISCONTINUED | OUTPATIENT
Start: 2023-11-22 | End: 2023-11-22 | Stop reason: HOSPADM

## 2023-11-22 RX ORDER — ASPIRIN 81 MG/1
81 TABLET, CHEWABLE ORAL DAILY
COMMUNITY
End: 2023-11-23 | Stop reason: CLARIF

## 2023-11-22 RX ORDER — LOSARTAN POTASSIUM 50 MG/1
100 TABLET ORAL DAILY
Status: DISCONTINUED | OUTPATIENT
Start: 2023-11-23 | End: 2023-11-22 | Stop reason: HOSPADM

## 2023-11-22 RX ORDER — ONDANSETRON 2 MG/ML
4 INJECTION INTRAMUSCULAR; INTRAVENOUS
Status: DISCONTINUED | OUTPATIENT
Start: 2023-11-22 | End: 2023-11-22 | Stop reason: HOSPADM

## 2023-11-22 RX ORDER — DIPHENHYDRAMINE HYDROCHLORIDE 50 MG/ML
6.25 INJECTION INTRAMUSCULAR; INTRAVENOUS
Status: DISCONTINUED | OUTPATIENT
Start: 2023-11-22 | End: 2023-11-22 | Stop reason: HOSPADM

## 2023-11-22 RX ORDER — SODIUM CHLORIDE, SODIUM LACTATE, POTASSIUM CHLORIDE, CALCIUM CHLORIDE 600; 310; 30; 20 MG/100ML; MG/100ML; MG/100ML; MG/100ML
INJECTION, SOLUTION INTRAVENOUS CONTINUOUS PRN
Status: DISCONTINUED | OUTPATIENT
Start: 2023-11-22 | End: 2023-11-22 | Stop reason: SDUPTHER

## 2023-11-22 RX ORDER — HYDROMORPHONE HYDROCHLORIDE 1 MG/ML
0.25 INJECTION, SOLUTION INTRAMUSCULAR; INTRAVENOUS; SUBCUTANEOUS EVERY 5 MIN PRN
Status: DISCONTINUED | OUTPATIENT
Start: 2023-11-22 | End: 2023-11-22 | Stop reason: HOSPADM

## 2023-11-22 RX ORDER — LEVOTHYROXINE SODIUM 0.05 MG/1
50 TABLET ORAL
Status: DISCONTINUED | OUTPATIENT
Start: 2023-11-23 | End: 2023-11-22 | Stop reason: HOSPADM

## 2023-11-22 RX ORDER — NALOXONE HYDROCHLORIDE 0.4 MG/ML
0.4 INJECTION, SOLUTION INTRAMUSCULAR; INTRAVENOUS; SUBCUTANEOUS PRN
Status: DISCONTINUED | OUTPATIENT
Start: 2023-11-22 | End: 2023-11-22 | Stop reason: HOSPADM

## 2023-11-22 RX ORDER — SENNA AND DOCUSATE SODIUM 50; 8.6 MG/1; MG/1
1 TABLET, FILM COATED ORAL 2 TIMES DAILY
Status: DISCONTINUED | OUTPATIENT
Start: 2023-11-22 | End: 2023-11-22 | Stop reason: HOSPADM

## 2023-11-22 RX ORDER — OXYCODONE HYDROCHLORIDE 5 MG/1
10 TABLET ORAL EVERY 4 HOURS PRN
Status: DISCONTINUED | OUTPATIENT
Start: 2023-11-22 | End: 2023-11-22 | Stop reason: HOSPADM

## 2023-11-22 RX ORDER — TRANEXAMIC ACID 100 MG/ML
INJECTION, SOLUTION INTRAVENOUS PRN
Status: DISCONTINUED | OUTPATIENT
Start: 2023-11-22 | End: 2023-11-22 | Stop reason: SDUPTHER

## 2023-11-22 RX ORDER — OXYCODONE HYDROCHLORIDE 5 MG/1
5 TABLET ORAL EVERY 4 HOURS PRN
Status: DISCONTINUED | OUTPATIENT
Start: 2023-11-22 | End: 2023-11-22 | Stop reason: HOSPADM

## 2023-11-22 RX ORDER — NITROGLYCERIN 0.4 MG/1
0.4 TABLET SUBLINGUAL EVERY 5 MIN PRN
Status: DISCONTINUED | OUTPATIENT
Start: 2023-11-22 | End: 2023-11-22 | Stop reason: HOSPADM

## 2023-11-22 RX ORDER — SODIUM CHLORIDE 0.9 % (FLUSH) 0.9 %
5-40 SYRINGE (ML) INJECTION EVERY 12 HOURS SCHEDULED
Status: DISCONTINUED | OUTPATIENT
Start: 2023-11-22 | End: 2023-11-22 | Stop reason: HOSPADM

## 2023-11-22 RX ORDER — ONDANSETRON 4 MG/1
4 TABLET, ORALLY DISINTEGRATING ORAL EVERY 8 HOURS PRN
Status: DISCONTINUED | OUTPATIENT
Start: 2023-11-22 | End: 2023-11-22 | Stop reason: HOSPADM

## 2023-11-22 RX ORDER — PROPOFOL 10 MG/ML
INJECTION, EMULSION INTRAVENOUS PRN
Status: DISCONTINUED | OUTPATIENT
Start: 2023-11-22 | End: 2023-11-22 | Stop reason: SDUPTHER

## 2023-11-22 RX ORDER — PANTOPRAZOLE SODIUM 40 MG/1
40 TABLET, DELAYED RELEASE ORAL
Status: DISCONTINUED | OUTPATIENT
Start: 2023-11-23 | End: 2023-11-22 | Stop reason: HOSPADM

## 2023-11-22 RX ORDER — ROPIVACAINE HYDROCHLORIDE 2 MG/ML
INJECTION, SOLUTION EPIDURAL; INFILTRATION; PERINEURAL PRN
Status: DISCONTINUED | OUTPATIENT
Start: 2023-11-22 | End: 2023-11-22 | Stop reason: ALTCHOICE

## 2023-11-22 RX ORDER — TRANEXAMIC ACID 650 MG/1
1300 TABLET ORAL
Status: DISCONTINUED | OUTPATIENT
Start: 2023-11-22 | End: 2023-11-22 | Stop reason: HOSPADM

## 2023-11-22 RX ORDER — GLYCOPYRROLATE 0.2 MG/ML
INJECTION INTRAMUSCULAR; INTRAVENOUS PRN
Status: DISCONTINUED | OUTPATIENT
Start: 2023-11-22 | End: 2023-11-22 | Stop reason: SDUPTHER

## 2023-11-22 RX ORDER — METFORMIN HYDROCHLORIDE 500 MG/1
500 TABLET, EXTENDED RELEASE ORAL NIGHTLY
Status: DISCONTINUED | OUTPATIENT
Start: 2023-11-22 | End: 2023-11-22 | Stop reason: HOSPADM

## 2023-11-22 RX ORDER — DIPHENHYDRAMINE HCL 25 MG
25 CAPSULE ORAL EVERY 6 HOURS PRN
Status: DISCONTINUED | OUTPATIENT
Start: 2023-11-22 | End: 2023-11-22 | Stop reason: HOSPADM

## 2023-11-22 RX ORDER — SODIUM CHLORIDE 0.9 % (FLUSH) 0.9 %
5-40 SYRINGE (ML) INJECTION PRN
Status: DISCONTINUED | OUTPATIENT
Start: 2023-11-22 | End: 2023-11-22 | Stop reason: HOSPADM

## 2023-11-22 RX ORDER — ACETAMINOPHEN 500 MG
1000 TABLET ORAL EVERY 6 HOURS
Status: DISCONTINUED | OUTPATIENT
Start: 2023-11-22 | End: 2023-11-22 | Stop reason: HOSPADM

## 2023-11-22 RX ORDER — LIDOCAINE HYDROCHLORIDE 20 MG/ML
INJECTION, SOLUTION EPIDURAL; INFILTRATION; INTRACAUDAL; PERINEURAL PRN
Status: DISCONTINUED | OUTPATIENT
Start: 2023-11-22 | End: 2023-11-22 | Stop reason: SDUPTHER

## 2023-11-22 RX ORDER — HYDROMORPHONE HYDROCHLORIDE 1 MG/ML
0.5 INJECTION, SOLUTION INTRAMUSCULAR; INTRAVENOUS; SUBCUTANEOUS EVERY 5 MIN PRN
Status: DISCONTINUED | OUTPATIENT
Start: 2023-11-22 | End: 2023-11-22 | Stop reason: HOSPADM

## 2023-11-22 RX ORDER — INSULIN LISPRO 100 [IU]/ML
0-16 INJECTION, SOLUTION INTRAVENOUS; SUBCUTANEOUS
Status: DISCONTINUED | OUTPATIENT
Start: 2023-11-22 | End: 2023-11-22 | Stop reason: HOSPADM

## 2023-11-22 RX ORDER — DIPHENHYDRAMINE HYDROCHLORIDE 50 MG/ML
25 INJECTION INTRAMUSCULAR; INTRAVENOUS EVERY 6 HOURS PRN
Status: DISCONTINUED | OUTPATIENT
Start: 2023-11-22 | End: 2023-11-22 | Stop reason: HOSPADM

## 2023-11-22 RX ORDER — ASPIRIN 81 MG/1
81 TABLET ORAL 2 TIMES DAILY
Status: DISCONTINUED | OUTPATIENT
Start: 2023-11-22 | End: 2023-11-22 | Stop reason: HOSPADM

## 2023-11-22 RX ORDER — ONDANSETRON 4 MG/1
8 TABLET, ORALLY DISINTEGRATING ORAL EVERY 8 HOURS PRN
Status: DISCONTINUED | OUTPATIENT
Start: 2023-11-22 | End: 2023-11-22

## 2023-11-22 RX ORDER — LIDOCAINE HYDROCHLORIDE 10 MG/ML
1 INJECTION, SOLUTION INFILTRATION; PERINEURAL
Status: COMPLETED | OUTPATIENT
Start: 2023-11-22 | End: 2023-11-22

## 2023-11-22 RX ORDER — OXYCODONE HYDROCHLORIDE 5 MG/1
5 TABLET ORAL PRN
Status: COMPLETED | OUTPATIENT
Start: 2023-11-22 | End: 2023-11-22

## 2023-11-22 RX ORDER — ONDANSETRON 2 MG/ML
4 INJECTION INTRAMUSCULAR; INTRAVENOUS EVERY 6 HOURS PRN
Status: DISCONTINUED | OUTPATIENT
Start: 2023-11-22 | End: 2023-11-22 | Stop reason: HOSPADM

## 2023-11-22 RX ORDER — OXYCODONE HYDROCHLORIDE 5 MG/1
10 TABLET ORAL PRN
Status: COMPLETED | OUTPATIENT
Start: 2023-11-22 | End: 2023-11-22

## 2023-11-22 RX ADMIN — LIDOCAINE HYDROCHLORIDE 80 MG: 20 INJECTION, SOLUTION EPIDURAL; INFILTRATION; INTRACAUDAL; PERINEURAL at 10:40

## 2023-11-22 RX ADMIN — SODIUM CHLORIDE, POTASSIUM CHLORIDE, SODIUM LACTATE AND CALCIUM CHLORIDE: 600; 310; 30; 20 INJECTION, SOLUTION INTRAVENOUS at 10:10

## 2023-11-22 RX ADMIN — GLYCOPYRROLATE 0.2 MG: 0.2 INJECTION INTRAMUSCULAR; INTRAVENOUS at 10:37

## 2023-11-22 RX ADMIN — PHENYLEPHRINE HYDROCHLORIDE 100 MCG: 0.1 INJECTION, SOLUTION INTRAVENOUS at 11:00

## 2023-11-22 RX ADMIN — ACETAMINOPHEN 1000 MG: 500 TABLET, FILM COATED ORAL at 16:08

## 2023-11-22 RX ADMIN — MEPIVACAINE HYDROCHLORIDE 60 MG: 20 INJECTION, SOLUTION EPIDURAL; INFILTRATION at 10:31

## 2023-11-22 RX ADMIN — TRANEXAMIC ACID 1000 MG: 100 INJECTION, SOLUTION INTRAVENOUS at 10:45

## 2023-11-22 RX ADMIN — TRANEXAMIC ACID 1300 MG: 650 TABLET ORAL at 16:08

## 2023-11-22 RX ADMIN — PHENYLEPHRINE HYDROCHLORIDE 40 MCG/MIN: 10 INJECTION INTRAVENOUS at 11:29

## 2023-11-22 RX ADMIN — OXYCODONE 10 MG: 5 TABLET ORAL at 16:08

## 2023-11-22 RX ADMIN — Medication 2000 MG: at 10:21

## 2023-11-22 RX ADMIN — OXYCODONE 5 MG: 5 TABLET ORAL at 12:26

## 2023-11-22 RX ADMIN — SODIUM CHLORIDE, SODIUM LACTATE, POTASSIUM CHLORIDE, AND CALCIUM CHLORIDE: 600; 310; 30; 20 INJECTION, SOLUTION INTRAVENOUS at 10:15

## 2023-11-22 RX ADMIN — PHENYLEPHRINE HYDROCHLORIDE 100 MCG: 0.1 INJECTION, SOLUTION INTRAVENOUS at 11:15

## 2023-11-22 RX ADMIN — LIDOCAINE HYDROCHLORIDE 1 ML: 10 INJECTION, SOLUTION INFILTRATION; PERINEURAL at 10:10

## 2023-11-22 RX ADMIN — PROPOFOL 100 MCG/KG/MIN: 10 INJECTION, EMULSION INTRAVENOUS at 10:41

## 2023-11-22 RX ADMIN — SODIUM CHLORIDE, SODIUM LACTATE, POTASSIUM CHLORIDE, AND CALCIUM CHLORIDE: 600; 310; 30; 20 INJECTION, SOLUTION INTRAVENOUS at 11:19

## 2023-11-22 RX ADMIN — KETAMINE HYDROCHLORIDE 30 MG: 50 INJECTION, SOLUTION INTRAMUSCULAR; INTRAVENOUS at 10:41

## 2023-11-22 RX ADMIN — PROPOFOL 60 MG: 10 INJECTION, EMULSION INTRAVENOUS at 10:40

## 2023-11-22 ASSESSMENT — PAIN - FUNCTIONAL ASSESSMENT: PAIN_FUNCTIONAL_ASSESSMENT: 0-10

## 2023-11-22 ASSESSMENT — PAIN SCALES - GENERAL
PAINLEVEL_OUTOF10: 0
PAINLEVEL_OUTOF10: 6

## 2023-11-22 NOTE — THERAPY EVALUATION
Corrina's independence with bed mobility, transfers, gait training, strength/ROM exercises, modalities for pain, and patient education.      TIME IN/OUT:  Time In: 1445  Time Out: 1926 [facility]  Minutes: [facility], PT

## 2023-11-22 NOTE — PERIOP NOTE
TRANSFER - OUT REPORT:    Verbal report given to [facility] on [address]  being transferred to [address] [place] [address] for routine progression of patient care       Report consisted of patient's Situation, Background, Assessment and   Recommendations(SBAR). Information from the following report(s) Nurse Handoff Report, Adult Overview, Surgery Report, MAR, and Cardiac Rhythm SR  was reviewed with the receiving nurse. Lines:   Peripheral IV 11/22/23 Posterior;Right Forearm (Active)   Site Assessment Clean, dry & intact 11/22/23 1217   Line Status Infusing 11/22/23 [address] checked and tightened 11/22/23 1217   Phlebitis Assessment No symptoms 11/22/23 1217   Infiltration Assessment 0 11/22/23 1217   Alcohol Cap Used No 11/22/23 1217   Dressing Status Clean, dry & intact 11/22/23 1217   Dressing Type Transparent 11/22/23 1217        Opportunity for questions and clarification was provided.       Patient transported with:  O2 @ 0lpm

## 2023-11-22 NOTE — PERIOP NOTE
MD Buddie Sensor  at bedside with patient. Pt VSS stable. Pain and Nausea controlled at this time. Verbal sign out per MD when pacu care is completed. Plan of care continues.

## 2023-11-22 NOTE — ANESTHESIA PROCEDURE NOTES
Spinal Block    Patient location during procedure: OR  End time: 11/22/2023 10:38 AM  Reason for block: post-op pain management, primary anesthetic and at surgeon's request  Staffing  Performed: anesthesiologist   Anesthesiologist: Eliel Huber MD  Performed by: Eliel Huber MD  Authorized by:  Eliel Huber MD    Spinal Block  Patient position: sitting  Prep: ChloraPrep  Patient monitoring: cardiac monitor, continuous pulse ox, frequent blood pressure checks and oxygen  Approach: midline  Location: L3/L4  Provider prep: mask and sterile gloves  Local infiltration: lidocaine  Needle  Needle type: pencil-tip   Needle gauge: 25 G  Needle length: 3.5 in  Catheter at skin depth: 7 cm  Assessment  Sensory level: T10  Swirl obtained: Yes  CSF: clear  Attempts: 1  Hemodynamics: stable  Preanesthetic Checklist  Completed: patient identified, IV checked, site marked, risks and benefits discussed, surgical/procedural consents, equipment checked, pre-op evaluation, timeout performed, anesthesia consent given, oxygen available, monitors applied/VS acknowledged and blood product R/B/A discussed and consented

## 2023-11-22 NOTE — H&P
Patient ID:  Milli Lanier  [id number]  89 y.o.  1940    Today: November 22, 2023       CC:  Right hip pain    HPI:   The patient has end stage arthritis of the right hip. The patient was evaluated and examined in the office prior to today and was found to have a history on physical exam consistent with intra-articular pathology of the right hip. Patient complains of anterior groin pain predominately. Pain occurs during activity. Patient has difficulty putting on socks/shoes and has notable pain when getting up from a chair and getting out of a car. Patient does not complain of significant lateral hip pain or radicular pain down the leg. There have been no changes to the patient's orthopedic condition since the last office visit    Past Medical History:  Past Medical History:   Diagnosis Date    Acne rosacea     Cancer ([address] [facility])     skin cancer    CKD (chronic kidney disease) stage 3, GFR 30-59 ml/min ([address] [facility]) 4/12/2013    managed by pcp    Coronary atherosclerosis of native coronary artery 04/12/2013 4/16/13 (Dr. Mary Israel) Coronary artery bypass grafting x4. Grafts consisting  of:  1. Left internal mammary artery to left anterior descending. 2. Reverse saphenous vein to the diagonal.  3. Reverse saphenous vein to the obtuse marginal.  4. Reverse saphenous vein graft to the posterior descending artery.       Diabetes mellitus, type 2 ([address] [facility])     Type 2 po meds, hgba1c 6.1 in 9/15/2323 does not recognize  s/sx of hypo; daily fasting blood sugar ave:<110    Endometrial cancer determined by uterine biopsy ([address] [facility]) 07/2023    Heart disease 2013    History of echocardiogram 04/15/2013    Care Everywhere    History of non-ST elevation myocardial infarction (NSTEMI) 04/12/2013    History of stress test 08/05/2015    Care Everywhere    History of unstable angina 09/23/2015    Hyperlipidemia 04/12/2013    Hypertension     Hypothyroid 4/12/2013    Osteoarthritis     PAF (paroxysmal atrial fibrillation) ([facility]) 4/22/2013

## 2023-11-22 NOTE — DISCHARGE INSTRUCTIONS
[address] [place] [address]      Patient Discharge Instructions    Viktor Parra / [id number] : 1940    Admitted 2023 Discharged: 2023     IF YOU HAVE ANY PROBLEMS ONCE YOU ARE AT HOME CALL THE FOLLOWING NUMBERS:   Main office number: [phone]      Medications    The medications you are to continue on are listed on the medication reconciliation sheet. Narcotic pain medications as well as supplemental iron can cause constipation. If this occurs try stopping the narcotic pain medication and/or the iron. It is important that you take the medication exactly as they are prescribed. Medications which increase your risk of blood clots are listed to stop for 5 weeks after surgery as well as medications or supplements which increase your risk of bleeding complications. Keep your medication in the bottles provided by the pharmacist and keep a list of the medication names, dosages, and times to be taken in your wallet. Do not take other medications without consulting your doctor. Important Information    Do NOT smoke as this will greatly increase your risk of infection! Resume your prehospital diet. If you have excessive nausea or vomitting call your doctor's office     Leg swelling and warmth is normal for 6 months after surgery. If you experience swelling in your leg elevate you leg while laying down with your toes above your heart. If you have sudden onset severe swelling with leg pain call our office. Use Kurt Hose stockings until we see you in the office for your follow up appointment. The stitches deep inside take approximately 6 months to dissolve. There will be sharp shooting, stinging and burning pain. This is normal and will resolve between 3-6 months after surgery. Difficulty sleeping is normal following total Knee and Hip replacement. You may try melatonin, an over-the-counter sleep aid or benadryl to help with sleep.  Most patients will resume sleeping through the

## 2023-11-22 NOTE — PROGRESS NOTES
Dr. Kristian Ramesh reviewed chart. New order received \"repeat PT/INR DOS please. \" Carolinee Caul to proceed.
Had therapy. Voided. Prescriptions were sent to pharmacy. Has follow up appt scheduled with Dr Lori Wise. Home health arranged for therapy. Instructed to remove bandage in am. Reminded of hip precautions. Discharge instructions given. Going to car via wheelchair.
TRANSFER - IN REPORT:    Verbal report received from [facility], RN on Luis Reedr  being received from PACU for routine post-op      Report consisted of patient's Situation, Background, Assessment and   Recommendations(SBAR). Information from the following report(s) Nurse Handoff Report was reviewed with the receiving nurse. Opportunity for questions and clarification was provided. Assessment completed upon patient's arrival to unit and care assumed. Oriented to room and bed controls. ABDs and tape to R hip dry and intact. Gripper socks to feet. Movement and sensation to LES remains decreased. Family member at bedside.
mobility and safety. PROBLEM LIST:   (Skilled intervention is medically necessary to address:)  Decreased ADL/Functional Activities  Decreased Activity Tolerance  Decreased Balance  Increased Pain   INTERVENTIONS PLANNED:   (Benefits and precautions of occupational therapy have been discussed with the patient.)  Self Care Training  Therapeutic Activity  Therapeutic Exercise/HEP  Neuromuscular Re-education  Manual Therapy  Education         TREATMENT:     EVALUATION: LOW COMPLEXITY: (Untimed Charge)    TREATMENT:   Self Care (30 minutes): Patient participated in toileting, upper body dressing, lower body dressing, and grooming ADLs in supported sitting and standing with minimal visual, verbal, and tactile cueing to increase independence. Patient also participated in functional mobility, functional transfer, and adaptive equipment training to increase independence and maintain precautions.      AFTER TREATMENT PRECAUTIONS: Bed/Chair Locked, Call light within reach, Chair, Heels floated, Needs within reach, and RN notified    INTERDISCIPLINARY COLLABORATION:  RN/ PCT and PT/ PTA    Interdisciplinary Patient Education  (Reference education tab)    [x] Safe And Effective Hygiene  [x] Fall Precautions  [x] Hip Precautions  [] D/C Instruction Review [x] Self Care Training and Home Safety  [x] Walker Management/Safety  [x] Adaptive Equipment as Needed  [x] Therapeutic Resting Position of Joint     TOTAL TREATMENT DURATION AND TIME:  Time In: 2163  Time Out: 1926 [facility]  Minutes: [address] [facility],

## 2023-11-22 NOTE — CARE COORDINATION
Patient is a 79y.o. year old female admitted for Right SHARON . Patient plans to return home on discharge. Order received to arrange home health. Patient without preference towards agency. Referral sent to [facility]. Patient denies any equipment needs as patient has a walker. Will follow until discharge. 11/22/23 9676   Service Assessment   Patient Orientation Alert and Oriented   Cognition Alert   History Provided By Patient   Services At/After Discharge   Transition of Care Consult (CM Consult) [facility] Discharge Home Health;PT   Mode of Transport at Discharge Self   Confirm Follow Up Transport Self   Condition of Participation: Discharge Planning   The Plan for Transition of Care is related to the following treatment goals: improve mobility   The Patient and/or Patient Representative was provided with a Choice of Provider? Patient   The Patient and/Or Patient Representative agree with the Discharge Plan? Yes   Freedom of Choice list was provided with basic dialogue that supports the patient's individualized plan of care/goals, treatment preferences, and shares the quality data associated with the providers?   Yes

## 2023-11-22 NOTE — ANESTHESIA POSTPROCEDURE EVALUATION
Department of Anesthesiology  Postprocedure Note    Patient: Sol Caballero  MRN: [id number]  YOB: 1940  Date of evaluation: 11/22/2023      Procedure Summary     Date: 11/22/23 [place] [facility] [place] [facility] [place]    Anesthesia Start: 3351 Anesthesia Stop: 1218    Procedure: RIGHT HIP TOTAL ARTHROPLASTY, [place]/ SDD (Right: Hip) Diagnosis:       Primary osteoarthritis of right hip      (Primary osteoarthritis of right hip [M16.11])    Surgeons: Iesha Turner MD Responsible Provider: Myrtha Lesch, MD    Anesthesia Type: spinal ASA Status: 3          Anesthesia Type: No value filed.     Mary Phase I: Mary Score: 10    Mary Phase II:        Anesthesia Post Evaluation    Patient location during evaluation: PACU  Patient participation: complete - patient participated  Level of consciousness: awake  Airway patency: patent  Nausea & Vomiting: no nausea  Complications: no  Cardiovascular status: blood pressure returned to baseline and hemodynamically stable  Respiratory status: acceptable  Hydration status: stable  Multimodal analgesia pain management approach  Pain management: adequate

## 2023-11-22 NOTE — ANESTHESIA PRE PROCEDURE
Department of Anesthesiology  Preprocedure Note       Name:  Milli Lanier   Age:  79 y.o.  :  1940                                          MRN:  [id number]         Date:  2023      Surgeon: Deangelo Jon):  Berto Hernandez MD    Procedure: Procedure(s):  RIGHT HIP TOTAL ARTHROPLASTY, [place]/ SDD    Medications prior to admission:   Prior to Admission medications    Medication Sig Start Date End Date Taking?  Authorizing Provider   Lancets MISC 1 each by Does not apply route daily LANCETS OF PATIENT'S CHOICE. 11/15/23   Stephanie Fish MD   acetaminophen (TYLENOL) 500 MG tablet Take 2 tablets by mouth every 6 hours as needed for Pain 23   Berto Hernandez MD   omeprazole (PRILOSEC) 40 MG delayed release capsule Take 1 capsule by mouth daily  Patient not taking: Reported on 2023   Berto Hernandez MD   ondansetron (ZOFRAN) 4 MG tablet Take 1 tablet by mouth 3 times daily as needed for Nausea or Vomiting 23   Berto Hernandez MD   senna (SENOKOT) 8.6 MG tablet Take 1 tablet by mouth 2 times daily  Patient not taking: Reported on 2023   Berto Hernandez MD   metFORMIN (GLUCOPHAGE-XR) 500 MG extended release tablet Take 1 tablet by mouth daily  Patient taking differently: Take 1 tablet by mouth nightly 23   Diego Henderson MD   levothyroxine (SYNTHROID) 50 MCG tablet TAKE 1 TABLET BY MOUTH EVERY DAY BEFORE BREAKFAST 23   Alexia LOPEZ MD   losartan (COZAAR) 100 MG tablet Take 1 tablet by mouth daily 8/15/23   Orlin Harmon MD   apixaban (ELIQUIS) 5 MG TABS tablet Take 1 tablet by mouth 2 times daily 8/15/23   Orlin Harmon MD   metoprolol succinate (TOPROL XL) 100 MG extended release tablet TAKE 1 TABLET BY MOUTH EVERY DAY 6/15/23   Orlin Harmon MD   furosemide (LASIX) 20 MG tablet Take 1 tablet by mouth daily as needed (as needed) 23   Marilee Green MD   atorvastatin

## 2023-11-23 ENCOUNTER — HOME CARE VISIT (OUTPATIENT)
Dept: SCHEDULING | Facility: HOME HEALTH | Age: 83
End: 2023-11-23

## 2023-11-23 PROCEDURE — G0151 HHCP-SERV OF PT,EA 15 MIN: HCPCS

## 2023-11-24 ENCOUNTER — TELEPHONE (OUTPATIENT)
Dept: ORTHOPEDIC SURGERY | Age: 83
End: 2023-11-24

## 2023-11-24 VITALS
TEMPERATURE: 100.1 F | RESPIRATION RATE: 18 BRPM | OXYGEN SATURATION: 96 % | HEART RATE: 82 BPM | DIASTOLIC BLOOD PRESSURE: 64 MMHG | SYSTOLIC BLOOD PRESSURE: 108 MMHG

## 2023-11-24 NOTE — TELEPHONE ENCOUNTER
She has a drug interaction listed between Eliquis and baby aspirin. Should she be on both of these. Please let her know.

## 2023-11-27 ENCOUNTER — TELEPHONE (OUTPATIENT)
Dept: ORTHOPEDICS UNIT | Age: 83
End: 2023-11-27

## 2023-11-27 ENCOUNTER — TELEPHONE (OUTPATIENT)
Dept: ORTHOPEDIC SURGERY | Age: 83
End: 2023-11-27

## 2023-11-27 NOTE — TELEPHONE ENCOUNTER
I called and spoke to the patient regarding her Eliquis and Baby Aspirin. Patient states that was in error, she is not taking both. She is only taking Eliquis now.

## 2023-11-27 NOTE — TELEPHONE ENCOUNTER
Called to follow up after SHARON with SDD on 11/22/23. Doing \" fine. \"  Pain level rated 2/10. Is no longer taking narcotics. Bowels have moved twice. Current with [facility]. Denies swelling to surgical leg. Reviewed contact number for ortho navigator.

## 2023-11-28 ENCOUNTER — HOME CARE VISIT (OUTPATIENT)
Dept: SCHEDULING | Facility: HOME HEALTH | Age: 83
End: 2023-11-28

## 2023-11-28 VITALS
RESPIRATION RATE: 19 BRPM | DIASTOLIC BLOOD PRESSURE: 64 MMHG | TEMPERATURE: 97.2 F | HEART RATE: 75 BPM | SYSTOLIC BLOOD PRESSURE: 128 MMHG

## 2023-11-28 PROCEDURE — G0157 HHC PT ASSISTANT EA 15: HCPCS

## 2023-11-28 ASSESSMENT — ENCOUNTER SYMPTOMS
CONTUSION: 1
DYSPNEA ACTIVITY LEVEL: AFTER AMBULATING MORE THAN 20 FT
PAIN LOCATION - PAIN QUALITY: ACHE
PAIN LOCATION - PAIN QUALITY: ACHE,SORE
CONSTIPATION: 1

## 2023-11-30 ENCOUNTER — HOME CARE VISIT (OUTPATIENT)
Dept: SCHEDULING | Facility: HOME HEALTH | Age: 83
End: 2023-11-30

## 2023-11-30 VITALS
RESPIRATION RATE: 18 BRPM | DIASTOLIC BLOOD PRESSURE: 64 MMHG | SYSTOLIC BLOOD PRESSURE: 124 MMHG | TEMPERATURE: 97.2 F | HEART RATE: 77 BPM

## 2023-11-30 PROCEDURE — G0157 HHC PT ASSISTANT EA 15: HCPCS

## 2023-11-30 ASSESSMENT — ENCOUNTER SYMPTOMS: PAIN LOCATION - PAIN QUALITY: ACHE

## 2023-12-05 ENCOUNTER — HOME CARE VISIT (OUTPATIENT)
Dept: SCHEDULING | Facility: HOME HEALTH | Age: 83
End: 2023-12-05

## 2023-12-05 VITALS
HEART RATE: 72 BPM | RESPIRATION RATE: 19 BRPM | DIASTOLIC BLOOD PRESSURE: 64 MMHG | TEMPERATURE: 97.3 F | SYSTOLIC BLOOD PRESSURE: 124 MMHG

## 2023-12-05 PROCEDURE — G0157 HHC PT ASSISTANT EA 15: HCPCS

## 2023-12-07 ENCOUNTER — HOME CARE VISIT (OUTPATIENT)
Dept: SCHEDULING | Facility: HOME HEALTH | Age: 83
End: 2023-12-07

## 2023-12-07 VITALS
TEMPERATURE: 97.1 F | DIASTOLIC BLOOD PRESSURE: 68 MMHG | HEART RATE: 74 BPM | RESPIRATION RATE: 18 BRPM | SYSTOLIC BLOOD PRESSURE: 126 MMHG

## 2023-12-07 PROCEDURE — G0157 HHC PT ASSISTANT EA 15: HCPCS

## 2023-12-11 ENCOUNTER — HOME CARE VISIT (OUTPATIENT)
Dept: SCHEDULING | Facility: HOME HEALTH | Age: 83
End: 2023-12-11
Payer: MEDICARE

## 2023-12-11 VITALS
RESPIRATION RATE: 19 BRPM | HEART RATE: 79 BPM | TEMPERATURE: 97.3 F | SYSTOLIC BLOOD PRESSURE: 126 MMHG | DIASTOLIC BLOOD PRESSURE: 64 MMHG

## 2023-12-11 PROCEDURE — G0157 HHC PT ASSISTANT EA 15: HCPCS

## 2023-12-12 ENCOUNTER — OFFICE VISIT (OUTPATIENT)
Dept: ORTHOPEDIC SURGERY | Age: 83
End: 2023-12-12

## 2023-12-12 DIAGNOSIS — M25.551 RIGHT HIP PAIN: Primary | ICD-10-CM

## 2023-12-12 DIAGNOSIS — Z96.641 STATUS POST RIGHT HIP REPLACEMENT: ICD-10-CM

## 2023-12-12 PROCEDURE — 99024 POSTOP FOLLOW-UP VISIT: CPT | Performed by: ORTHOPAEDIC SURGERY

## 2023-12-12 NOTE — PROGRESS NOTES
Patient ID:  Tiffany Rangel  [id number]  7 y.o.  1940    Today: December 12, 2023       CHIEF COMPLAINT:  Follow-up of right total hip replacement    HISTORY:  The patient presents today for 3-week follow-up after total hip replacement. The patient continues to improve gradually. Working with physical therapy on strengthening and stretching. They are on aspirin for DVT prophylaxis. Using assistive walking device appropriately. Continues to take medication appropriately. PE:  Incision is examined which is healing well. No erythema, induration or drainage. No significant fluid accumulation around the surgical site distally. Distally able to grossly plantar and dorsiflex foot and ankle. Sensation intact. Limb is perfused. No sign of DVT. X-RAYS:    XR Pelvis 2/3 Views  Views Obtained: AP Pelvis and Frog leg lateral of right hip  Indication: Postop Right Total Hip Replacement  Findings:   X-rays are viewed which show total hip replacement in place on the right side. All hardware appears to be stable compared to immediate postoperative films. The acetabular component appears to be in good position, no evidence of loosening. Anteversion and inclination angle appear to be within acceptable criteria. The stem appears to be appropriately sized without any evidence of subsidence. No evidence of proximal femoral periprosthetic fracture. Hip is reduced. Impression: Normal Xray after total hip replacement    Woodard Meckel, MD    ASSESSMENT:  3 Weeks S/P Right Total Hip Replacement    PLAN:  Continue activity and current weight bearing status. Continue posterior hip precautions if applicable. Continue to take the aspirin for another week for a full month of DVT prophylaxis. They are given a refill on their pain medication if they feel they are going to run out. The patient is given a script for antibiotics to be taken for dental prophylaxis.   I have asked the patient not to submerge

## 2023-12-13 ENCOUNTER — HOME CARE VISIT (OUTPATIENT)
Dept: SCHEDULING | Facility: HOME HEALTH | Age: 83
End: 2023-12-13
Payer: MEDICARE

## 2023-12-13 VITALS
SYSTOLIC BLOOD PRESSURE: 128 MMHG | RESPIRATION RATE: 19 BRPM | DIASTOLIC BLOOD PRESSURE: 64 MMHG | HEART RATE: 74 BPM | TEMPERATURE: 97.3 F

## 2023-12-13 PROCEDURE — G0157 HHC PT ASSISTANT EA 15: HCPCS

## 2023-12-13 RX ORDER — METOPROLOL SUCCINATE 100 MG/1
TABLET, EXTENDED RELEASE ORAL
Qty: 90 TABLET | Refills: 1 | Status: SHIPPED | OUTPATIENT
Start: 2023-12-13

## 2023-12-15 DIAGNOSIS — M54.50 CHRONIC LOW BACK PAIN, UNSPECIFIED BACK PAIN LATERALITY, UNSPECIFIED WHETHER SCIATICA PRESENT: Primary | ICD-10-CM

## 2023-12-15 DIAGNOSIS — G89.29 CHRONIC LOW BACK PAIN, UNSPECIFIED BACK PAIN LATERALITY, UNSPECIFIED WHETHER SCIATICA PRESENT: Primary | ICD-10-CM

## 2023-12-15 RX ORDER — CYCLOBENZAPRINE HCL 5 MG
5 TABLET ORAL 3 TIMES DAILY PRN
Qty: 30 TABLET | Refills: 0 | Status: SHIPPED | OUTPATIENT
Start: 2023-12-15 | End: 2023-12-25

## 2024-01-15 DIAGNOSIS — I10 PRIMARY HYPERTENSION: ICD-10-CM

## 2024-01-15 DIAGNOSIS — N18.31 TYPE 2 DIABETES MELLITUS WITH STAGE 3A CHRONIC KIDNEY DISEASE, WITHOUT LONG-TERM CURRENT USE OF INSULIN (HCC): ICD-10-CM

## 2024-01-15 DIAGNOSIS — E11.22 TYPE 2 DIABETES MELLITUS WITH STAGE 3A CHRONIC KIDNEY DISEASE, WITHOUT LONG-TERM CURRENT USE OF INSULIN (HCC): ICD-10-CM

## 2024-01-15 LAB
ALBUMIN SERPL-MCNC: 3.5 G/DL (ref 3.2–4.6)
ALBUMIN/GLOB SERPL: 1 (ref 0.4–1.6)
ALP SERPL-CCNC: 123 U/L (ref 50–136)
ALT SERPL-CCNC: 16 U/L (ref 12–65)
ANION GAP SERPL CALC-SCNC: 3 MMOL/L (ref 2–11)
AST SERPL-CCNC: 19 U/L (ref 15–37)
BASOPHILS # BLD: 0.1 K/UL (ref 0–0.2)
BASOPHILS NFR BLD: 1 % (ref 0–2)
BILIRUB SERPL-MCNC: 0.6 MG/DL (ref 0.2–1.1)
BUN SERPL-MCNC: 14 MG/DL (ref 8–23)
CALCIUM SERPL-MCNC: 9.6 MG/DL (ref 8.3–10.4)
CHLORIDE SERPL-SCNC: 113 MMOL/L (ref 103–113)
CHOLEST SERPL-MCNC: 137 MG/DL
CO2 SERPL-SCNC: 24 MMOL/L (ref 21–32)
CREAT SERPL-MCNC: 1 MG/DL (ref 0.6–1)
CREAT UR-MCNC: 93 MG/DL
DIFFERENTIAL METHOD BLD: ABNORMAL
EOSINOPHIL # BLD: 0.3 K/UL (ref 0–0.8)
EOSINOPHIL NFR BLD: 5 % (ref 0.5–7.8)
ERYTHROCYTE [DISTWIDTH] IN BLOOD BY AUTOMATED COUNT: 14.8 % (ref 11.9–14.6)
GLOBULIN SER CALC-MCNC: 3.6 G/DL (ref 2.8–4.5)
GLUCOSE SERPL-MCNC: 103 MG/DL (ref 65–100)
HCT VFR BLD AUTO: 43.3 % (ref 35.8–46.3)
HDLC SERPL-MCNC: 49 MG/DL (ref 40–60)
HDLC SERPL: 2.8
HGB BLD-MCNC: 13.7 G/DL (ref 11.7–15.4)
IMM GRANULOCYTES # BLD AUTO: 0 K/UL (ref 0–0.5)
IMM GRANULOCYTES NFR BLD AUTO: 0 % (ref 0–5)
LDLC SERPL CALC-MCNC: 59.6 MG/DL
LYMPHOCYTES # BLD: 1.5 K/UL (ref 0.5–4.6)
LYMPHOCYTES NFR BLD: 27 % (ref 13–44)
MCH RBC QN AUTO: 30.1 PG (ref 26.1–32.9)
MCHC RBC AUTO-ENTMCNC: 31.6 G/DL (ref 31.4–35)
MCV RBC AUTO: 95.2 FL (ref 82–102)
MICROALBUMIN UR-MCNC: 3.63 MG/DL
MICROALBUMIN/CREAT UR-RTO: 39 MG/G (ref 0–30)
MONOCYTES # BLD: 0.5 K/UL (ref 0.1–1.3)
MONOCYTES NFR BLD: 8 % (ref 4–12)
NEUTS SEG # BLD: 3.3 K/UL (ref 1.7–8.2)
NEUTS SEG NFR BLD: 59 % (ref 43–78)
NRBC # BLD: 0 K/UL (ref 0–0.2)
PLATELET # BLD AUTO: 270 K/UL (ref 150–450)
PMV BLD AUTO: 10.7 FL (ref 9.4–12.3)
POTASSIUM SERPL-SCNC: 4 MMOL/L (ref 3.5–5.1)
PROT SERPL-MCNC: 7.1 G/DL (ref 6.3–8.2)
RBC # BLD AUTO: 4.55 M/UL (ref 4.05–5.2)
SODIUM SERPL-SCNC: 140 MMOL/L (ref 136–146)
TRIGL SERPL-MCNC: 142 MG/DL (ref 35–150)
TSH W FREE THYROID IF ABNORMAL: 2.32 UIU/ML (ref 0.36–3.74)
VLDLC SERPL CALC-MCNC: 28.4 MG/DL (ref 6–23)
WBC # BLD AUTO: 5.7 K/UL (ref 4.3–11.1)

## 2024-01-16 LAB
EST. AVERAGE GLUCOSE BLD GHB EST-MCNC: 123 MG/DL
HBA1C MFR BLD: 5.9 % (ref 4.8–5.6)

## 2024-01-18 ENCOUNTER — OFFICE VISIT (OUTPATIENT)
Dept: INTERNAL MEDICINE CLINIC | Facility: CLINIC | Age: 84
End: 2024-01-18
Payer: MEDICARE

## 2024-01-18 VITALS
SYSTOLIC BLOOD PRESSURE: 124 MMHG | BODY MASS INDEX: 25.06 KG/M2 | WEIGHT: 150.4 LBS | HEIGHT: 65 IN | OXYGEN SATURATION: 95 % | DIASTOLIC BLOOD PRESSURE: 68 MMHG | HEART RATE: 56 BPM

## 2024-01-18 DIAGNOSIS — I48.0 PAROXYSMAL ATRIAL FIBRILLATION (HCC): ICD-10-CM

## 2024-01-18 DIAGNOSIS — E78.00 PURE HYPERCHOLESTEROLEMIA: ICD-10-CM

## 2024-01-18 DIAGNOSIS — E03.9 ACQUIRED HYPOTHYROIDISM: ICD-10-CM

## 2024-01-18 DIAGNOSIS — E11.22 TYPE 2 DIABETES MELLITUS WITH STAGE 3A CHRONIC KIDNEY DISEASE, WITHOUT LONG-TERM CURRENT USE OF INSULIN (HCC): ICD-10-CM

## 2024-01-18 DIAGNOSIS — I10 PRIMARY HYPERTENSION: Primary | ICD-10-CM

## 2024-01-18 DIAGNOSIS — N18.31 TYPE 2 DIABETES MELLITUS WITH STAGE 3A CHRONIC KIDNEY DISEASE, WITHOUT LONG-TERM CURRENT USE OF INSULIN (HCC): ICD-10-CM

## 2024-01-18 PROBLEM — M16.11 OSTEOARTHRITIS OF RIGHT HIP, UNSPECIFIED OSTEOARTHRITIS TYPE: Status: RESOLVED | Noted: 2023-11-22 | Resolved: 2024-01-18

## 2024-01-18 PROBLEM — C54.1 ADENOCARCINOMA OF THE ENDOMETRIUM/UTERUS (HCC): Status: RESOLVED | Noted: 2023-07-26 | Resolved: 2024-01-18

## 2024-01-18 PROBLEM — N95.0 POSTMENOPAUSAL BLEEDING: Status: RESOLVED | Noted: 2023-05-25 | Resolved: 2024-01-18

## 2024-01-18 PROBLEM — C54.1 ENDOMETRIAL CANCER (HCC): Status: RESOLVED | Noted: 2023-08-08 | Resolved: 2024-01-18

## 2024-01-18 PROBLEM — N95.0 PMB (POSTMENOPAUSAL BLEEDING): Status: RESOLVED | Noted: 2023-06-30 | Resolved: 2024-01-18

## 2024-01-18 PROCEDURE — 3074F SYST BP LT 130 MM HG: CPT | Performed by: INTERNAL MEDICINE

## 2024-01-18 PROCEDURE — 3044F HG A1C LEVEL LT 7.0%: CPT | Performed by: INTERNAL MEDICINE

## 2024-01-18 PROCEDURE — 3078F DIAST BP <80 MM HG: CPT | Performed by: INTERNAL MEDICINE

## 2024-01-18 PROCEDURE — G8484 FLU IMMUNIZE NO ADMIN: HCPCS | Performed by: INTERNAL MEDICINE

## 2024-01-18 PROCEDURE — 99214 OFFICE O/P EST MOD 30 MIN: CPT | Performed by: INTERNAL MEDICINE

## 2024-01-18 PROCEDURE — 1036F TOBACCO NON-USER: CPT | Performed by: INTERNAL MEDICINE

## 2024-01-18 PROCEDURE — G8417 CALC BMI ABV UP PARAM F/U: HCPCS | Performed by: INTERNAL MEDICINE

## 2024-01-18 PROCEDURE — 1123F ACP DISCUSS/DSCN MKR DOCD: CPT | Performed by: INTERNAL MEDICINE

## 2024-01-18 PROCEDURE — 1090F PRES/ABSN URINE INCON ASSESS: CPT | Performed by: INTERNAL MEDICINE

## 2024-01-18 PROCEDURE — G8427 DOCREV CUR MEDS BY ELIG CLIN: HCPCS | Performed by: INTERNAL MEDICINE

## 2024-01-18 PROCEDURE — G8400 PT W/DXA NO RESULTS DOC: HCPCS | Performed by: INTERNAL MEDICINE

## 2024-01-18 ASSESSMENT — ENCOUNTER SYMPTOMS
CONSTIPATION: 0
SHORTNESS OF BREATH: 0
ABDOMINAL DISTENTION: 0
ABDOMINAL PAIN: 0
COUGH: 0
CHEST TIGHTNESS: 0

## 2024-01-18 NOTE — PROGRESS NOTES
Pulmonary:      Effort: Pulmonary effort is normal.      Breath sounds: Normal breath sounds.   Neurological:      General: No focal deficit present.      Mental Status: She is alert and oriented to person, place, and time.   Psychiatric:         Mood and Affect: Mood normal.         Behavior: Behavior normal.          Recent labs:    Hemoglobin A1C   Date Value Ref Range Status   01/15/2024 5.9 (H) 4.8 - 5.6 % Final        Lab Results   Component Value Date    CHOL 137 01/15/2024    CHOL 137 01/13/2023    CHOL 128 10/07/2022     Lab Results   Component Value Date    TRIG 142 01/15/2024    TRIG 108 01/13/2023    TRIG 94 10/07/2022     Lab Results   Component Value Date    HDL 49 01/15/2024    HDL 53 01/13/2023    HDL 48 10/07/2022     Lab Results   Component Value Date    LDLCALC 59.6 01/15/2024    LDLCALC 62.4 01/13/2023    LDLCALC 61.2 10/07/2022     Lab Results   Component Value Date    LABVLDL 28.4 (H) 01/15/2024    LABVLDL 21.6 01/13/2023    LABVLDL 18.8 10/07/2022    VLDL 25 05/27/2021     Lab Results   Component Value Date    CHOLHDLRATIO 2.8 01/15/2024    CHOLHDLRATIO 2.6 01/13/2023    CHOLHDLRATIO 2.7 10/07/2022     Lab Results   Component Value Date    TSH 3.120 12/21/2021    TSHELE 2.32 01/15/2024    ENP8EAO 2.030 10/07/2022         Lab Results   Component Value Date     01/15/2024    K 4.0 01/15/2024     01/15/2024    CO2 24 01/15/2024    BUN 14 01/15/2024    CREATININE 1.00 01/15/2024    GLUCOSE 103 (H) 01/15/2024    CALCIUM 9.6 01/15/2024    PROT 7.1 01/15/2024    LABALBU 3.5 01/15/2024    BILITOT 0.6 01/15/2024    ALKPHOS 123 01/15/2024    AST 19 01/15/2024    ALT 16 01/15/2024    LABGLOM 56 (L) 01/15/2024    GFRAA 56 (L) 12/21/2021    AGRATIO 1.7 12/21/2021    GLOB 3.6 01/15/2024       Lab Results   Component Value Date    WBC 5.7 01/15/2024    HGB 13.7 01/15/2024    HCT 43.3 01/15/2024    MCV 95.2 01/15/2024     01/15/2024             This document was generated with the aid of

## 2024-01-31 RX ORDER — LOSARTAN POTASSIUM 100 MG/1
100 TABLET ORAL DAILY
Qty: 90 TABLET | Refills: 3 | Status: SHIPPED | OUTPATIENT
Start: 2024-01-31

## 2024-01-31 NOTE — TELEPHONE ENCOUNTER
Requested Prescriptions     Pending Prescriptions Disp Refills    losartan (COZAAR) 100 MG tablet 90 tablet 3     Sig: Take 1 tablet by mouth daily      RX verified, last seen 11/10/23.  Forwarding to MD for signature.

## 2024-02-20 ENCOUNTER — OFFICE VISIT (OUTPATIENT)
Dept: OBGYN CLINIC | Age: 84
End: 2024-02-20
Payer: MEDICARE

## 2024-02-20 VITALS
DIASTOLIC BLOOD PRESSURE: 78 MMHG | WEIGHT: 154 LBS | BODY MASS INDEX: 25.66 KG/M2 | SYSTOLIC BLOOD PRESSURE: 136 MMHG | HEIGHT: 65 IN

## 2024-02-20 DIAGNOSIS — Z85.42 HISTORY OF ENDOMETRIAL CANCER: Primary | ICD-10-CM

## 2024-02-20 PROCEDURE — G8417 CALC BMI ABV UP PARAM F/U: HCPCS | Performed by: OBSTETRICS & GYNECOLOGY

## 2024-02-20 PROCEDURE — 3075F SYST BP GE 130 - 139MM HG: CPT | Performed by: OBSTETRICS & GYNECOLOGY

## 2024-02-20 PROCEDURE — G8427 DOCREV CUR MEDS BY ELIG CLIN: HCPCS | Performed by: OBSTETRICS & GYNECOLOGY

## 2024-02-20 PROCEDURE — 1123F ACP DISCUSS/DSCN MKR DOCD: CPT | Performed by: OBSTETRICS & GYNECOLOGY

## 2024-02-20 PROCEDURE — G8400 PT W/DXA NO RESULTS DOC: HCPCS | Performed by: OBSTETRICS & GYNECOLOGY

## 2024-02-20 PROCEDURE — 3078F DIAST BP <80 MM HG: CPT | Performed by: OBSTETRICS & GYNECOLOGY

## 2024-02-20 PROCEDURE — 99213 OFFICE O/P EST LOW 20 MIN: CPT | Performed by: OBSTETRICS & GYNECOLOGY

## 2024-02-20 PROCEDURE — 1090F PRES/ABSN URINE INCON ASSESS: CPT | Performed by: OBSTETRICS & GYNECOLOGY

## 2024-02-20 PROCEDURE — 1036F TOBACCO NON-USER: CPT | Performed by: OBSTETRICS & GYNECOLOGY

## 2024-02-20 PROCEDURE — G8484 FLU IMMUNIZE NO ADMIN: HCPCS | Performed by: OBSTETRICS & GYNECOLOGY

## 2024-02-20 NOTE — PROGRESS NOTES
Kristyn Houser  83 y.o.  1940  [id number]         Today:  24          Chief Complaint   Patient presents with    Other     Repeat pap smear       Subjective:    83 y.o. , presents today for f/u pap, surveillance for endometrial cancer.   Reports:  no VB    DATE OF SERVICE:  2023     PREOPERATIVE DIAGNOSIS:  Endometrial adenocarcinoma.     POSTOPERATIVE DIAGNOSIS:  Endometrial adenocarcinoma.     PROCEDURES PERFORMED:  1.  Laparoscopic robotic hysterectomy, bilateral salpingo-oophorectomy.  2.  Injection of radioactive dye for sentinel lymph node identification.  3.  Laparoscopic robotic retroperitoneal lymph node biopsies, bilateral.     SURGEON:  Wade Leon MD     2023   DIAGNOSIS       A:  \"BILATERAL PELVIC SENTINEL LYMPH NODES\":  ONE BENIGN LYMPH NODE,   NEGATIVE FOR TUMOR (0/1).        B:  \"UTERUS, BILATERAL TUBES AND OVARIES\":  ENDOMETRIOID   ADENOCARCINOMA, FIGO GRADE 1, SHOWING MAXIMAL INVASION OF APPROXIMATELY 7   MM IN 15 MM OF MYOMETRIUM; LEIOMYOMA; BENIGN PARATUBAL CYSTS; OVARIAN   ATROPHY.      No LMP recorded. Patient has had a hysterectomy.      OB History    Para Term  AB Living   13 13 13 0 0 0   SAB IAB Ectopic Molar Multiple Live Births   0 0 0 0 0 0       Allergies   Allergen Reactions    Codeine Nausea And Vomiting       Current Outpatient Medications   Medication Sig    losartan (COZAAR) 100 MG tablet Take 1 tablet by mouth daily    blood glucose monitor strips 1 strip by Other route daily Test one times a day & as needed for symptoms of irregular blood glucose. Dispense sufficient amount for indicated testing frequency plus additional to accommodate PRN testing needs. ONE TOUCH ULTRA BLUE TEST STRIPS DX:  E11.22, N18.31    metoprolol succinate (TOPROL XL) 100 MG extended release tablet TAKE 1 TABLET BY MOUTH EVERY DAY    Lancets MISC 1 each by Does not apply route daily LANCETS OF PATIENT'S CHOICE.    metFORMIN (GLUCOPHAGE-XR) 500 MG extended

## 2024-02-28 LAB
COLLECTION METHOD: NORMAL
CYTOLOGIST CVX/VAG CYTO: NORMAL
CYTOLOGY CVX/VAG DOC THIN PREP: NORMAL
Lab: NORMAL
Lab: NORMAL
OTHER PT INFO: NORMAL
PAP SOURCE: NORMAL
PATH REPORT.FINAL DX SPEC: NORMAL
PREV CYTO INFO: NORMAL
PREV TREATMENT RESULTS: NORMAL
PREV TREATMENT: NORMAL
STAT OF ADQ CVX/VAG CYTO-IMP: NORMAL

## 2024-03-12 ENCOUNTER — OFFICE VISIT (OUTPATIENT)
Dept: ORTHOPEDIC SURGERY | Age: 84
End: 2024-03-12
Payer: MEDICARE

## 2024-03-12 DIAGNOSIS — Z96.641 STATUS POST RIGHT HIP REPLACEMENT: Primary | ICD-10-CM

## 2024-03-12 PROCEDURE — G8427 DOCREV CUR MEDS BY ELIG CLIN: HCPCS | Performed by: ORTHOPAEDIC SURGERY

## 2024-03-12 PROCEDURE — G8400 PT W/DXA NO RESULTS DOC: HCPCS | Performed by: ORTHOPAEDIC SURGERY

## 2024-03-12 PROCEDURE — 1123F ACP DISCUSS/DSCN MKR DOCD: CPT | Performed by: ORTHOPAEDIC SURGERY

## 2024-03-12 PROCEDURE — G8417 CALC BMI ABV UP PARAM F/U: HCPCS | Performed by: ORTHOPAEDIC SURGERY

## 2024-03-12 PROCEDURE — G8484 FLU IMMUNIZE NO ADMIN: HCPCS | Performed by: ORTHOPAEDIC SURGERY

## 2024-03-12 PROCEDURE — 1036F TOBACCO NON-USER: CPT | Performed by: ORTHOPAEDIC SURGERY

## 2024-03-12 PROCEDURE — 1090F PRES/ABSN URINE INCON ASSESS: CPT | Performed by: ORTHOPAEDIC SURGERY

## 2024-03-12 PROCEDURE — 99213 OFFICE O/P EST LOW 20 MIN: CPT | Performed by: ORTHOPAEDIC SURGERY

## 2024-03-12 NOTE — PROGRESS NOTES
patient will continue to work on stretching and strengthening of the operative extremity. The patient at this point has no hip precautions. They are given a refill on their pain medication if deemed appropriate. At this point they may submerge the incision under water and can resume driving if they have not already.  The patient has previously been given a script for antibiotics to be taken for dental prophylaxis.  I will plan to check them back for 1 year follow-up or sooner if they have any issues, questions or concerns.         Signed By: Louis Fernandez MD  March 12, 2024

## 2024-03-13 DIAGNOSIS — E03.9 ACQUIRED HYPOTHYROIDISM: Primary | ICD-10-CM

## 2024-03-13 RX ORDER — LEVOTHYROXINE SODIUM 0.05 MG/1
TABLET ORAL
Qty: 90 TABLET | Refills: 1 | Status: SHIPPED | OUTPATIENT
Start: 2024-03-13

## 2024-03-13 NOTE — TELEPHONE ENCOUNTER
Requested Prescriptions     Pending Prescriptions Disp Refills    levothyroxine (SYNTHROID) 50 MCG tablet [Pharmacy Med Name: LEVOTHYROXINE 50 MCG TABLET] 90 tablet 1     Sig: TAKE 1 TABLET BY MOUTH EVERY DAY BEFORE BREAKFAST

## 2024-05-10 RX ORDER — ATORVASTATIN CALCIUM 40 MG/1
40 TABLET, FILM COATED ORAL DAILY
Qty: 90 TABLET | Refills: 3 | Status: SHIPPED | OUTPATIENT
Start: 2024-05-10

## 2024-05-10 NOTE — TELEPHONE ENCOUNTER
Prescriptions sent to pharmacy//casndab  Requested Prescriptions     Signed Prescriptions Disp Refills    atorvastatin (LIPITOR) 40 MG tablet 90 tablet 3     Sig: TAKE 1 TABLET BY MOUTH EVERY DAY     Authorizing Provider: ANNETTA HARPER     Ordering User: BENNY BLUE

## 2024-05-14 NOTE — PROGRESS NOTES
[facility] CARDIOLOGY  [address] [place] [address], [address]  [place] [address]  PHONE: [phone]      05/15/24    NAME:  Kristyn Houser  : 1940  MRN: [id number]         SUBJECTIVE:   Kristyn Houser is a 83 y.o. female seen for a follow up visit regarding the following:     Chief Complaint   Patient presents with    Follow-up    Coronary Artery Disease    Atrial Fibrillation            HPI:  Follow up  Follow-up, Coronary Artery Disease, and Atrial Fibrillation   .    Follow up CAD and afib.    She feels palpitations after dinner, may happen during the night but then when she takes her medication it settles.  Happened twice in Feb, twice in Mar, once in Apr.  She stopped taking her atorvastatin.  After her hip replacement she noticed her thighs and knees were aching and making it difficult to get off the toilet.  She followed up with her orthopedist who recommended holding it.  She felt better, resumed it and the symptoms recurred, stopped it 10 days ago.  Only bothers her lying in bed at night, gone since she held the medication again.                    Past cardiac history:   13 (Dr. Iglesias) Coronary artery bypass grafting x4. Grafts consisting    of:    1. Left internal mammary artery to left anterior descending.    2. Reverse saphenous vein to the diagonal.    3. Reverse saphenous vein to the obtuse marginal.    4. Reverse saphenous vein graft to the posterior descending artery.   2020- 7 day monitor showed 1 brief episode of atrial fib/flutter with variable heart rate  2022- 2 week monitor  9% burden of both atrial fibrillation and typical atrial flutter with variable conduction and RVR  Average rate while in afib/flutter is ~125.    No significant bradycardia or block  Rare aberrant conduction  Reported symptoms generally correlate with RVR.                 Cardiac Medications       Nitrates       nitroGLYCERIN (NITROSTAT) 0.4 MG SL tablet Place 1 tablet under the tongue

## 2024-05-15 ENCOUNTER — OFFICE VISIT (OUTPATIENT)
Age: 84
End: 2024-05-15
Payer: MEDICARE

## 2024-05-15 VITALS
BODY MASS INDEX: 25.83 KG/M2 | HEIGHT: 65 IN | WEIGHT: 155 LBS | HEART RATE: 64 BPM | DIASTOLIC BLOOD PRESSURE: 70 MMHG | SYSTOLIC BLOOD PRESSURE: 132 MMHG

## 2024-05-15 DIAGNOSIS — E11.22 TYPE 2 DIABETES MELLITUS WITH STAGE 3A CHRONIC KIDNEY DISEASE, WITHOUT LONG-TERM CURRENT USE OF INSULIN (HCC): ICD-10-CM

## 2024-05-15 DIAGNOSIS — I48.0 PAROXYSMAL ATRIAL FIBRILLATION (HCC): Primary | ICD-10-CM

## 2024-05-15 DIAGNOSIS — N18.31 TYPE 2 DIABETES MELLITUS WITH STAGE 3A CHRONIC KIDNEY DISEASE, WITHOUT LONG-TERM CURRENT USE OF INSULIN (HCC): ICD-10-CM

## 2024-05-15 DIAGNOSIS — I10 PRIMARY HYPERTENSION: ICD-10-CM

## 2024-05-15 PROCEDURE — G8417 CALC BMI ABV UP PARAM F/U: HCPCS | Performed by: INTERNAL MEDICINE

## 2024-05-15 PROCEDURE — G8427 DOCREV CUR MEDS BY ELIG CLIN: HCPCS | Performed by: INTERNAL MEDICINE

## 2024-05-15 PROCEDURE — 3044F HG A1C LEVEL LT 7.0%: CPT | Performed by: INTERNAL MEDICINE

## 2024-05-15 PROCEDURE — 93000 ELECTROCARDIOGRAM COMPLETE: CPT | Performed by: INTERNAL MEDICINE

## 2024-05-15 PROCEDURE — 3075F SYST BP GE 130 - 139MM HG: CPT | Performed by: INTERNAL MEDICINE

## 2024-05-15 PROCEDURE — G8400 PT W/DXA NO RESULTS DOC: HCPCS | Performed by: INTERNAL MEDICINE

## 2024-05-15 PROCEDURE — 3078F DIAST BP <80 MM HG: CPT | Performed by: INTERNAL MEDICINE

## 2024-05-15 PROCEDURE — 1123F ACP DISCUSS/DSCN MKR DOCD: CPT | Performed by: INTERNAL MEDICINE

## 2024-05-15 PROCEDURE — 99214 OFFICE O/P EST MOD 30 MIN: CPT | Performed by: INTERNAL MEDICINE

## 2024-05-15 PROCEDURE — 1036F TOBACCO NON-USER: CPT | Performed by: INTERNAL MEDICINE

## 2024-05-15 PROCEDURE — 1090F PRES/ABSN URINE INCON ASSESS: CPT | Performed by: INTERNAL MEDICINE

## 2024-05-15 RX ORDER — ATORVASTATIN CALCIUM 40 MG/1
20 TABLET, FILM COATED ORAL
Qty: 90 TABLET | Refills: 3 | COMMUNITY
Start: 2024-05-15

## 2024-05-15 ASSESSMENT — ENCOUNTER SYMPTOMS: SHORTNESS OF BREATH: 0

## 2024-05-15 NOTE — PATIENT INSTRUCTIONS
Patient Education        Learning About the Mediterranean Diet  What is the Mediterranean diet?     The Mediterranean diet is a style of eating rather than a diet plan. It features foods eaten in [place], [place], southern [place], and other countries along the [place] Sea. It emphasizes eating foods like fish, fruits, vegetables, beans, high-fiber breads and whole grains, nuts, and olive oil. This style of eating includes limited red meat, cheese, and sweets.  Why choose the Mediterranean diet?  A Mediterranean-style diet may improve heart health. It contains more fat than other heart-healthy diets. But the fats are mainly from nuts, unsaturated oils (such as fish oils and olive oil), and certain nut or seed oils (such as canola, soybean, or flaxseed oil). These fats may help protect the heart and blood vessels.  How can you get started on the Mediterranean diet?  Here are some things you can do to switch to a more Mediterranean way of eating.  What to eat  Eat a variety of fruits and vegetables each day, such as grapes, blueberries, tomatoes, broccoli, peppers, figs, olives, spinach, eggplant, beans, lentils, and chickpeas.  Eat a variety of whole-grain foods each day, such as oats, brown rice, and whole wheat bread, pasta, and couscous.  Eat fish at least 2 times a week. Try tuna, salmon, mackerel, lake trout, herring, or sardines.  Eat moderate amounts of low-fat dairy products, such as milk, cheese, or yogurt.  Eat moderate amounts of poultry and eggs.  Choose healthy (unsaturated) fats, such as nuts, olive oil, and certain nut or seed oils like canola, soybean, and flaxseed.  Limit unhealthy (saturated) fats, such as butter, palm oil, and coconut oil. And limit fats found in animal products, such as meat and dairy products made with whole milk. Try to eat red meat only a few times a month in very small amounts.  Limit sweets and desserts to only a few times a week. This includes sugar-sweetened

## 2024-05-29 ENCOUNTER — PATIENT MESSAGE (OUTPATIENT)
Age: 84
End: 2024-05-29

## 2024-05-29 NOTE — TELEPHONE ENCOUNTER
Requested Prescriptions     Pending Prescriptions Disp Refills    metoprolol succinate (TOPROL XL) 100 MG extended release tablet 90 tablet 3     Sig: Take 1 tablet by mouth daily     Rx verified last ov 5/15/24

## 2024-05-30 RX ORDER — METOPROLOL SUCCINATE 100 MG/1
100 TABLET, EXTENDED RELEASE ORAL DAILY
Qty: 90 TABLET | Refills: 3 | Status: SHIPPED | OUTPATIENT
Start: 2024-05-30

## 2024-07-03 ENCOUNTER — APPOINTMENT (RX ONLY)
Dept: URBAN - METROPOLITAN AREA CLINIC 24 | Facility: CLINIC | Age: 84
Setting detail: DERMATOLOGY
End: 2024-07-03

## 2024-07-03 DIAGNOSIS — D18.0 HEMANGIOMA: ICD-10-CM

## 2024-07-03 DIAGNOSIS — D22 MELANOCYTIC NEVI: ICD-10-CM

## 2024-07-03 DIAGNOSIS — Z71.89 OTHER SPECIFIED COUNSELING: ICD-10-CM

## 2024-07-03 DIAGNOSIS — L57.8 OTHER SKIN CHANGES DUE TO CHRONIC EXPOSURE TO NONIONIZING RADIATION: ICD-10-CM

## 2024-07-03 DIAGNOSIS — L81.4 OTHER MELANIN HYPERPIGMENTATION: ICD-10-CM

## 2024-07-03 DIAGNOSIS — D485 NEOPLASM OF UNCERTAIN BEHAVIOR OF SKIN: ICD-10-CM

## 2024-07-03 DIAGNOSIS — L57.0 ACTINIC KERATOSIS: ICD-10-CM

## 2024-07-03 DIAGNOSIS — Z85.828 PERSONAL HISTORY OF OTHER MALIGNANT NEOPLASM OF SKIN: ICD-10-CM

## 2024-07-03 DIAGNOSIS — L82.1 OTHER SEBORRHEIC KERATOSIS: ICD-10-CM

## 2024-07-03 PROBLEM — D18.01 HEMANGIOMA OF SKIN AND SUBCUTANEOUS TISSUE: Status: ACTIVE | Noted: 2024-07-03

## 2024-07-03 PROBLEM — D22.5 MELANOCYTIC NEVI OF TRUNK: Status: ACTIVE | Noted: 2024-07-03

## 2024-07-03 PROBLEM — D48.5 NEOPLASM OF UNCERTAIN BEHAVIOR OF SKIN: Status: ACTIVE | Noted: 2024-07-03

## 2024-07-03 PROCEDURE — ? PRESCRIPTION MEDICATION MANAGEMENT

## 2024-07-03 PROCEDURE — 99213 OFFICE O/P EST LOW 20 MIN: CPT | Mod: 25

## 2024-07-03 PROCEDURE — ? BIOPSY BY SHAVE METHOD

## 2024-07-03 PROCEDURE — 11103 TANGNTL BX SKIN EA SEP/ADDL: CPT

## 2024-07-03 PROCEDURE — 11102 TANGNTL BX SKIN SINGLE LES: CPT

## 2024-07-03 PROCEDURE — ? COUNSELING

## 2024-07-03 PROCEDURE — ? OTHER

## 2024-07-03 ASSESSMENT — LOCATION ZONE DERM
LOCATION ZONE: LEG
LOCATION ZONE: FACE
LOCATION ZONE: TRUNK
LOCATION ZONE: NOSE
LOCATION ZONE: HAND

## 2024-07-03 ASSESSMENT — LOCATION SIMPLE DESCRIPTION DERM
LOCATION SIMPLE: LEFT CHEEK
LOCATION SIMPLE: RIGHT HAND
LOCATION SIMPLE: RIGHT CHEEK
LOCATION SIMPLE: CHEST
LOCATION SIMPLE: NOSE
LOCATION SIMPLE: ABDOMEN
LOCATION SIMPLE: RIGHT THIGH
LOCATION SIMPLE: UPPER BACK

## 2024-07-03 ASSESSMENT — LOCATION DETAILED DESCRIPTION DERM
LOCATION DETAILED: RIGHT CENTRAL MALAR CHEEK
LOCATION DETAILED: STERNAL NOTCH
LOCATION DETAILED: RIGHT ULNAR DORSAL HAND
LOCATION DETAILED: RIGHT DORSAL SMALL FINGER METACARPOPHALANGEAL JOINT
LOCATION DETAILED: LEFT CENTRAL MALAR CHEEK
LOCATION DETAILED: SUPERIOR THORACIC SPINE
LOCATION DETAILED: EPIGASTRIC SKIN
LOCATION DETAILED: NASAL SUPRATIP
LOCATION DETAILED: INFERIOR THORACIC SPINE
LOCATION DETAILED: RIGHT ANTERIOR DISTAL THIGH

## 2024-07-03 NOTE — PROCEDURE: BIOPSY BY SHAVE METHOD
Detail Level: Detailed
Depth Of Biopsy: dermis
Was A Bandage Applied: Yes
Size Of Lesion In Cm: 0.6
X Size Of Lesion In Cm: 0
Biopsy Type: H and E
Biopsy Method: Dermablade
Anesthesia Type: 1% lidocaine with epinephrine
Anesthesia Volume In Cc: 0.5
Hemostasis: Electrocautery
Wound Care: Petrolatum
Dressing: bandage
Type Of Destruction Used: Electrodesiccation and Curettage
Curettage Text: The wound bed was treated with curettage after the biopsy was performed.
Cryotherapy Text: The wound bed was treated with cryotherapy after the biopsy was performed.
Electrodesiccation Text: The wound bed was treated with electrodesiccation after the biopsy was performed.
Electrodesiccation And Curettage Text: The wound bed was treated with electrodesiccation and curettage after the biopsy was performed.
Silver Nitrate Text: The wound bed was treated with silver nitrate after the biopsy was performed.
Lab: [id number]
Lab Facility: [id number]
Render Path Notes In Note?: No
Consent: Written consent was obtained and risks were reviewed including but not limited to scarring, infection, bleeding, scabbing, incomplete removal, nerve damage and allergy to anesthesia.
Post-Care Instructions: I reviewed with the patient in detail post-care instructions. Patient is to keep the biopsy site dry overnight, and then apply bacitracin twice daily until healed. Patient may apply hydrogen peroxide soaks to remove any crusting.
Notification Instructions: Patient will be notified of biopsy results. However, patient instructed to call the office if not contacted within 2 weeks.
Billing Type: Third-Party Bill
Information: Selecting Yes will display possible errors in your note based on the variables you have selected. This validation is only offered as a suggestion for you. PLEASE NOTE THAT THE VALIDATION TEXT WILL BE REMOVED WHEN YOU FINALIZE YOUR NOTE. IF YOU WANT TO FAX A PRELIMINARY NOTE YOU WILL NEED TO TOGGLE THIS TO 'NO' IF YOU DO NOT WANT IT IN YOUR FAXED NOTE.
Size Of Lesion In Cm: 0.8
Hemostasis: Drysol
Type Of Destruction Used: Curettage

## 2024-07-03 NOTE — PROCEDURE: PRESCRIPTION MEDICATION MANAGEMENT
Plan: Discussed treatment with field therapy in the cooler months.
Render In Strict Bullet Format?: No
Detail Level: Zone

## 2024-07-09 ENCOUNTER — OFFICE VISIT (OUTPATIENT)
Dept: ORTHOPEDIC SURGERY | Age: 84
End: 2024-07-09
Payer: MEDICARE

## 2024-07-09 DIAGNOSIS — M25.551 RIGHT HIP PAIN: Primary | ICD-10-CM

## 2024-07-09 PROCEDURE — 1036F TOBACCO NON-USER: CPT | Performed by: ORTHOPAEDIC SURGERY

## 2024-07-09 PROCEDURE — G8400 PT W/DXA NO RESULTS DOC: HCPCS | Performed by: ORTHOPAEDIC SURGERY

## 2024-07-09 PROCEDURE — 1090F PRES/ABSN URINE INCON ASSESS: CPT | Performed by: ORTHOPAEDIC SURGERY

## 2024-07-09 PROCEDURE — 99213 OFFICE O/P EST LOW 20 MIN: CPT | Performed by: ORTHOPAEDIC SURGERY

## 2024-07-09 PROCEDURE — G8427 DOCREV CUR MEDS BY ELIG CLIN: HCPCS | Performed by: ORTHOPAEDIC SURGERY

## 2024-07-09 PROCEDURE — G8417 CALC BMI ABV UP PARAM F/U: HCPCS | Performed by: ORTHOPAEDIC SURGERY

## 2024-07-09 PROCEDURE — 1123F ACP DISCUSS/DSCN MKR DOCD: CPT | Performed by: ORTHOPAEDIC SURGERY

## 2024-07-09 NOTE — PROGRESS NOTES
Patient ID:  Kristyn Houser  [id number]  83 y.o.  1940    Today: July 9, 2024          Chief Complaint: Right Hip pain    HPI:       Kristyn Houser is a 83 y.o. female seen for evaluation and treatment of pain after total hip replacement. The patient underwent hip arthroplasty approximately 8 months ago. Patient reports that the hip feels great during the day and that she stays very active. She states that she has some aching in the evening. Has tried some Tylenol for it.      Past Medical History:  Past Medical History:   Diagnosis Date    Acne rosacea     Cancer ([facility])     skin cancer    CKD (chronic kidney disease) stage 3, GFR 30-59 ml/min ([facility]) 4/12/2013    managed by pcp    Coronary atherosclerosis of native coronary artery 04/12/2013 4/16/13 (Dr. Iglesias) Coronary artery bypass grafting x4. Grafts consisting  of:  1. Left internal mammary artery to left anterior descending.  2. Reverse saphenous vein to the diagonal.  3. Reverse saphenous vein to the obtuse marginal.  4. Reverse saphenous vein graft to the posterior descending artery.      Diabetes mellitus, type 2 ([facility])     Type 2 po meds, hgba1c 6.1 in 9/15/2323 does not recognize  s/sx of hypo; daily fasting blood sugar ave:<110    Endometrial cancer determined by uterine biopsy ([facility]) 07/2023    Heart disease 2013    History of echocardiogram 04/15/2013    Care Everywhere    History of non-ST elevation myocardial infarction (NSTEMI) 04/12/2013    History of stress test 08/05/2015    Care Everywhere    History of unstable angina 09/23/2015    Hyperlipidemia 04/12/2013    Hypertension     Hypothyroid 4/12/2013    Osteoarthritis     PAF (paroxysmal atrial fibrillation) ([facility]) 4/22/2013       Past Surgical History:  Past Surgical History:   Procedure Laterality Date    BLEPHAROPLASTY      BUNIONECTOMY      multiple    CARDIAC SURGERY      CORONARY ARTERY BYPASS GRAFT  2013    x 4 - Dr. Iglesias    DILATION AND CURETTAGE OF UTERUS N/A

## 2024-07-11 ENCOUNTER — PATIENT MESSAGE (OUTPATIENT)
Dept: INTERNAL MEDICINE CLINIC | Facility: CLINIC | Age: 84
End: 2024-07-11

## 2024-07-11 DIAGNOSIS — N18.31 TYPE 2 DIABETES MELLITUS WITH STAGE 3A CHRONIC KIDNEY DISEASE, WITHOUT LONG-TERM CURRENT USE OF INSULIN (HCC): Primary | ICD-10-CM

## 2024-07-11 DIAGNOSIS — E11.22 TYPE 2 DIABETES MELLITUS WITH STAGE 3A CHRONIC KIDNEY DISEASE, WITHOUT LONG-TERM CURRENT USE OF INSULIN (HCC): Primary | ICD-10-CM

## 2024-07-11 NOTE — TELEPHONE ENCOUNTER
From: Kristyn Houser  To: Dr. Catherine Dan  Sent: 7/11/2024 2:18 PM EDT  Subject: Blood work    Dr. Hale,    I hope you are doing well and staying out of the heat! I have an appointment on July 22 and am wondering if we are going to do any blood work before this visit.    Kristyn Houser

## 2024-07-18 DIAGNOSIS — N18.31 TYPE 2 DIABETES MELLITUS WITH STAGE 3A CHRONIC KIDNEY DISEASE, WITHOUT LONG-TERM CURRENT USE OF INSULIN (HCC): ICD-10-CM

## 2024-07-18 DIAGNOSIS — E11.22 TYPE 2 DIABETES MELLITUS WITH STAGE 3A CHRONIC KIDNEY DISEASE, WITHOUT LONG-TERM CURRENT USE OF INSULIN (HCC): ICD-10-CM

## 2024-07-18 LAB
ALBUMIN SERPL-MCNC: 3.7 G/DL (ref 3.2–4.6)
ALBUMIN/GLOB SERPL: 1 (ref 1–1.9)
ALP SERPL-CCNC: 110 U/L (ref 35–104)
ALT SERPL-CCNC: <5 U/L (ref 12–65)
ANION GAP SERPL CALC-SCNC: 13 MMOL/L (ref 9–18)
AST SERPL-CCNC: 27 U/L (ref 15–37)
BILIRUB SERPL-MCNC: 0.4 MG/DL (ref 0–1.2)
BUN SERPL-MCNC: 17 MG/DL (ref 8–23)
CALCIUM SERPL-MCNC: 9.8 MG/DL (ref 8.8–10.2)
CHLORIDE SERPL-SCNC: 104 MMOL/L (ref 98–107)
CO2 SERPL-SCNC: 23 MMOL/L (ref 20–28)
CREAT SERPL-MCNC: 1.07 MG/DL (ref 0.6–1.1)
EST. AVERAGE GLUCOSE BLD GHB EST-MCNC: 137 MG/DL
GLOBULIN SER CALC-MCNC: 3.7 G/DL (ref 2.3–3.5)
GLUCOSE SERPL-MCNC: 99 MG/DL (ref 70–99)
HBA1C MFR BLD: 6.4 % (ref 0–5.6)
POTASSIUM SERPL-SCNC: 4.3 MMOL/L (ref 3.5–5.1)
PROT SERPL-MCNC: 7.4 G/DL (ref 6.3–8.2)
SODIUM SERPL-SCNC: 141 MMOL/L (ref 136–145)

## 2024-07-19 ASSESSMENT — LIFESTYLE VARIABLES
HOW OFTEN DO YOU HAVE A DRINK CONTAINING ALCOHOL: 4
HOW MANY STANDARD DRINKS CONTAINING ALCOHOL DO YOU HAVE ON A TYPICAL DAY: 1
HOW OFTEN DO YOU HAVE SIX OR MORE DRINKS ON ONE OCCASION: 1

## 2024-07-22 ENCOUNTER — OFFICE VISIT (OUTPATIENT)
Dept: INTERNAL MEDICINE CLINIC | Facility: CLINIC | Age: 84
End: 2024-07-22
Payer: MEDICARE

## 2024-07-22 ENCOUNTER — PATIENT MESSAGE (OUTPATIENT)
Dept: INTERNAL MEDICINE CLINIC | Facility: CLINIC | Age: 84
End: 2024-07-22

## 2024-07-22 VITALS
BODY MASS INDEX: 24.81 KG/M2 | DIASTOLIC BLOOD PRESSURE: 68 MMHG | HEIGHT: 66 IN | SYSTOLIC BLOOD PRESSURE: 142 MMHG | HEART RATE: 57 BPM | OXYGEN SATURATION: 97 % | WEIGHT: 154.4 LBS

## 2024-07-22 DIAGNOSIS — E78.2 MIXED HYPERLIPIDEMIA: ICD-10-CM

## 2024-07-22 DIAGNOSIS — Z12.31 SCREENING MAMMOGRAM FOR BREAST CANCER: ICD-10-CM

## 2024-07-22 DIAGNOSIS — N18.31 TYPE 2 DIABETES MELLITUS WITH STAGE 3A CHRONIC KIDNEY DISEASE, WITHOUT LONG-TERM CURRENT USE OF INSULIN (HCC): ICD-10-CM

## 2024-07-22 DIAGNOSIS — E03.9 ACQUIRED HYPOTHYROIDISM: ICD-10-CM

## 2024-07-22 DIAGNOSIS — E11.22 TYPE 2 DIABETES MELLITUS WITH STAGE 3A CHRONIC KIDNEY DISEASE, WITHOUT LONG-TERM CURRENT USE OF INSULIN (HCC): ICD-10-CM

## 2024-07-22 DIAGNOSIS — Z00.00 MEDICARE ANNUAL WELLNESS VISIT, SUBSEQUENT: Primary | ICD-10-CM

## 2024-07-22 PROBLEM — M16.11 PRIMARY OSTEOARTHRITIS OF RIGHT HIP: Status: RESOLVED | Noted: 2023-11-08 | Resolved: 2024-07-22

## 2024-07-22 PROCEDURE — G8400 PT W/DXA NO RESULTS DOC: HCPCS | Performed by: INTERNAL MEDICINE

## 2024-07-22 PROCEDURE — 99214 OFFICE O/P EST MOD 30 MIN: CPT | Performed by: INTERNAL MEDICINE

## 2024-07-22 PROCEDURE — G8427 DOCREV CUR MEDS BY ELIG CLIN: HCPCS | Performed by: INTERNAL MEDICINE

## 2024-07-22 PROCEDURE — 1036F TOBACCO NON-USER: CPT | Performed by: INTERNAL MEDICINE

## 2024-07-22 PROCEDURE — 3078F DIAST BP <80 MM HG: CPT | Performed by: INTERNAL MEDICINE

## 2024-07-22 PROCEDURE — G0439 PPPS, SUBSEQ VISIT: HCPCS | Performed by: INTERNAL MEDICINE

## 2024-07-22 PROCEDURE — 3077F SYST BP >= 140 MM HG: CPT | Performed by: INTERNAL MEDICINE

## 2024-07-22 PROCEDURE — G8420 CALC BMI NORM PARAMETERS: HCPCS | Performed by: INTERNAL MEDICINE

## 2024-07-22 PROCEDURE — 1123F ACP DISCUSS/DSCN MKR DOCD: CPT | Performed by: INTERNAL MEDICINE

## 2024-07-22 PROCEDURE — 3044F HG A1C LEVEL LT 7.0%: CPT | Performed by: INTERNAL MEDICINE

## 2024-07-22 PROCEDURE — 1090F PRES/ABSN URINE INCON ASSESS: CPT | Performed by: INTERNAL MEDICINE

## 2024-07-22 NOTE — PROGRESS NOTES
Medicare Annual Wellness Visit    Kristyn Huoser is here for Medicare AWV (Review labs)    Assessment & Plan   Medicare annual wellness visit, subsequent  Screening mammogram for breast cancer  -     MICHELINE LEROY DIGITAL SCREEN BILATERAL; Future  Type 2 diabetes mellitus with stage 3a chronic kidney disease, without long-term current use of insulin (HCC)  Acquired hypothyroidism  Mixed hyperlipidemia    Reinforced importance of continued low-carb diet, and exercise routine to prevent worsening diabetes numbers, hemoglobin A1c continues to be controlled, but higher than before,  Will continue with current doses of levothyroxine,  Will continue with his statin therapy and anticoagulation  Will continue mammogram  No longer doing bone density and colonoscopy.  Reports concerns about insomnia, but she feels with good energy, we discussed about sleep hygiene    Recommendations for Preventive Services Due: see orders and patient instructions/AVS.  Recommended screening schedule for the next 5-10 years is provided to the patient in written form: see Patient Instructions/AVS.     Return in 6 months (on 1/22/2025).     Subjective   The following acute and/or chronic problems were also addressed today:  Annual wellness send, she is here for her annual wellness visit and follow-up in chronic conditions including hyperlipidemia, diabetes, and thyroid disease,  She tolerates current medications, denies any side effects,  Has been following with cardiology for her known history of paroxysmal A-fib,  And Ortho for hip arthritis,  She continues to be very active, exercising,  Labs were recently done showing increasing hemoglobin A1c,  She is not interested in bone densities at this time as she will not proceed therapy with biphosphonate's, if needed, her last bone density was done in 2015, showing osteopenia,  She is no longer doing colonoscopies,  Doing Paps every year, following with GYN      Patient's complete Health Risk

## 2024-07-23 NOTE — TELEPHONE ENCOUNTER
From: Kristyn Houser  To: Dr. Catherine Dan  Sent: 7/22/2024 4:44 PM EDT  Subject: Memory test    DR. VENCES,    I thought the 3 words were Leaders, Seasons and Tables. Were those NOT the correct words?    I enjoyed my visit with you today. Kristyn

## 2024-08-02 NOTE — TELEPHONE ENCOUNTER
Requested Prescriptions     Pending Prescriptions Disp Refills    apixaban (ELIQUIS) 5 MG TABS tablet 180 tablet 3     Sig: Take 1 tablet by mouth 2 times daily     Rx verified last ov 5/15/24

## 2024-08-20 DIAGNOSIS — N18.31 TYPE 2 DIABETES MELLITUS WITH STAGE 3A CHRONIC KIDNEY DISEASE, WITHOUT LONG-TERM CURRENT USE OF INSULIN (HCC): ICD-10-CM

## 2024-08-20 DIAGNOSIS — E11.22 TYPE 2 DIABETES MELLITUS WITH STAGE 3A CHRONIC KIDNEY DISEASE, WITHOUT LONG-TERM CURRENT USE OF INSULIN (HCC): ICD-10-CM

## 2024-08-20 RX ORDER — METFORMIN HYDROCHLORIDE 500 MG/1
500 TABLET, EXTENDED RELEASE ORAL DAILY
Qty: 90 TABLET | Refills: 1 | Status: SHIPPED | OUTPATIENT
Start: 2024-08-20

## 2024-09-06 DIAGNOSIS — E03.9 ACQUIRED HYPOTHYROIDISM: ICD-10-CM

## 2024-09-06 RX ORDER — LEVOTHYROXINE SODIUM 50 UG/1
TABLET ORAL
Qty: 90 TABLET | Refills: 1 | OUTPATIENT
Start: 2024-09-06

## 2024-09-06 RX ORDER — LEVOTHYROXINE SODIUM 50 UG/1
50 TABLET ORAL
Qty: 90 TABLET | Refills: 1 | Status: SHIPPED | OUTPATIENT
Start: 2024-09-06

## 2024-09-06 NOTE — TELEPHONE ENCOUNTER
Requested Prescriptions     Pending Prescriptions Disp Refills    levothyroxine (SYNTHROID) 50 MCG tablet 90 tablet 1     Sig: Take 1 tablet by mouth every morning (before breakfast)    Next ov 01/22/2025.

## 2024-09-13 ENCOUNTER — HOSPITAL ENCOUNTER (OUTPATIENT)
Dept: MAMMOGRAPHY | Age: 84
Discharge: HOME | End: 2024-09-16
Payer: MEDICARE

## 2024-09-13 VITALS — WEIGHT: 150 LBS | BODY MASS INDEX: 24.11 KG/M2 | HEIGHT: 66 IN

## 2024-09-13 DIAGNOSIS — Z12.31 SCREENING MAMMOGRAM FOR BREAST CANCER: ICD-10-CM

## 2024-09-13 PROCEDURE — 77063 BREAST TOMOSYNTHESIS BI: CPT

## 2024-09-16 ENCOUNTER — PATIENT MESSAGE (OUTPATIENT)
Dept: INTERNAL MEDICINE CLINIC | Facility: CLINIC | Age: 84
End: 2024-09-16

## 2024-09-16 ENCOUNTER — OFFICE VISIT (OUTPATIENT)
Dept: INTERNAL MEDICINE CLINIC | Facility: CLINIC | Age: 84
End: 2024-09-16
Payer: MEDICARE

## 2024-09-16 VITALS
WEIGHT: 152 LBS | BODY MASS INDEX: 24.43 KG/M2 | SYSTOLIC BLOOD PRESSURE: 158 MMHG | OXYGEN SATURATION: 95 % | DIASTOLIC BLOOD PRESSURE: 72 MMHG | HEIGHT: 66 IN | HEART RATE: 72 BPM

## 2024-09-16 DIAGNOSIS — R60.0 EDEMA OF LEFT LOWER EXTREMITY: Primary | ICD-10-CM

## 2024-09-16 PROCEDURE — G8400 PT W/DXA NO RESULTS DOC: HCPCS | Performed by: INTERNAL MEDICINE

## 2024-09-16 PROCEDURE — G8417 CALC BMI ABV UP PARAM F/U: HCPCS | Performed by: INTERNAL MEDICINE

## 2024-09-16 PROCEDURE — 1036F TOBACCO NON-USER: CPT | Performed by: INTERNAL MEDICINE

## 2024-09-16 PROCEDURE — 3078F DIAST BP <80 MM HG: CPT | Performed by: INTERNAL MEDICINE

## 2024-09-16 PROCEDURE — G8427 DOCREV CUR MEDS BY ELIG CLIN: HCPCS | Performed by: INTERNAL MEDICINE

## 2024-09-16 PROCEDURE — 1123F ACP DISCUSS/DSCN MKR DOCD: CPT | Performed by: INTERNAL MEDICINE

## 2024-09-16 PROCEDURE — 3077F SYST BP >= 140 MM HG: CPT | Performed by: INTERNAL MEDICINE

## 2024-09-16 PROCEDURE — 99214 OFFICE O/P EST MOD 30 MIN: CPT | Performed by: INTERNAL MEDICINE

## 2024-09-16 PROCEDURE — 1090F PRES/ABSN URINE INCON ASSESS: CPT | Performed by: INTERNAL MEDICINE

## 2024-09-16 RX ORDER — FUROSEMIDE 20 MG
20 TABLET ORAL DAILY PRN
Qty: 30 TABLET | Refills: 5
Start: 2024-09-16

## 2024-09-16 ASSESSMENT — ENCOUNTER SYMPTOMS
COUGH: 0
ABDOMINAL DISTENTION: 0
CHEST TIGHTNESS: 0
CONSTIPATION: 0
ABDOMINAL PAIN: 0
SHORTNESS OF BREATH: 0

## 2024-09-17 ENCOUNTER — HOSPITAL ENCOUNTER (OUTPATIENT)
Dept: ULTRASOUND IMAGING | Age: 84
Discharge: HOME | End: 2024-09-19
Payer: MEDICARE

## 2024-09-17 DIAGNOSIS — R60.0 EDEMA OF LEFT LOWER EXTREMITY: ICD-10-CM

## 2024-09-17 PROCEDURE — 93971 EXTREMITY STUDY: CPT

## 2024-11-14 NOTE — PROGRESS NOTES
[facility] CARDIOLOGY  [address] [place] [address], [address]  [place] [address]  PHONE: [phone]      11/15/24    NAME:  Kristyn Houser  : 1940  MRN: [id number]         SUBJECTIVE:   Kristyn Houser is a 84 y.o. female seen for a follow up visit regarding the following:     Chief Complaint   Patient presents with    Follow-up            HPI:  Follow up  Follow-up   .    Follow up CAD/CABG and afib.    She continues to play tennis, played this morning, in fact.  She has no angina, dyspnea, palpitations.  She feels much better taking atorvastatin only 3 days a week.  She had LLE edema a few months ago.  She started taking lasix and wearing compression socks.  There's no pain, the edema is better. She notes frequent left leg stress playing tennis, relies on that leg with her serve and of course pivoting, etc.  DVT was ruled out by her PCP (and she's on Eliquis as well, of course).              Past cardiac history:   13 (Dr. Iglesias)        LIMA-LAD, SVG-Dx, SVG-OM, SVG-rPDA   2020-        7 day monitor -1 brief atrial fib/flutter with variable heart rate  2022-        2 week monitor -9% burden AF/typical flutter   Avg rate afib/flutter is ~125.  Reported symptoms generally correlate with RVR.                       Cardiac Medications       Nitrates       nitroGLYCERIN (NITROSTAT) 0.4 MG SL tablet Place 1 tablet under the tongue every 5 minutes as needed for Chest pain       Beta Blockers Cardio-Selective       metoprolol succinate (TOPROL XL) 100 MG extended release tablet Take 1 tablet by mouth daily       Loop Diuretics       furosemide (LASIX) 20 MG tablet Take 1 tablet by mouth daily as needed (as needed)       HMG CoA Reductase Inhibitors       atorvastatin (LIPITOR) 40 MG tablet Take 0.5 tablets by mouth three times a week       Angiotensin II Receptor Antagonists       losartan (COZAAR) 100 MG tablet Take 1 tablet by mouth daily       Salicylates       aspirin 81 MG chewable

## 2024-11-15 ENCOUNTER — OFFICE VISIT (OUTPATIENT)
Age: 84
End: 2024-11-15

## 2024-11-15 VITALS
BODY MASS INDEX: 26.19 KG/M2 | RESPIRATION RATE: 16 BRPM | WEIGHT: 157.2 LBS | SYSTOLIC BLOOD PRESSURE: 122 MMHG | HEIGHT: 65 IN | HEART RATE: 68 BPM | DIASTOLIC BLOOD PRESSURE: 64 MMHG

## 2024-11-15 DIAGNOSIS — I48.0 PAROXYSMAL ATRIAL FIBRILLATION (HCC): Primary | ICD-10-CM

## 2024-11-15 DIAGNOSIS — E11.22 TYPE 2 DIABETES MELLITUS WITH STAGE 3A CHRONIC KIDNEY DISEASE, WITHOUT LONG-TERM CURRENT USE OF INSULIN (HCC): ICD-10-CM

## 2024-11-15 DIAGNOSIS — N18.31 TYPE 2 DIABETES MELLITUS WITH STAGE 3A CHRONIC KIDNEY DISEASE, WITHOUT LONG-TERM CURRENT USE OF INSULIN (HCC): ICD-10-CM

## 2024-11-15 DIAGNOSIS — I10 ESSENTIAL HYPERTENSION: ICD-10-CM

## 2024-11-15 ASSESSMENT — ENCOUNTER SYMPTOMS: SHORTNESS OF BREATH: 0

## 2024-12-04 ENCOUNTER — PATIENT MESSAGE (OUTPATIENT)
Dept: INTERNAL MEDICINE CLINIC | Facility: CLINIC | Age: 84
End: 2024-12-04

## 2024-12-04 DIAGNOSIS — N18.31 TYPE 2 DIABETES MELLITUS WITH STAGE 3A CHRONIC KIDNEY DISEASE, WITHOUT LONG-TERM CURRENT USE OF INSULIN (HCC): ICD-10-CM

## 2024-12-04 DIAGNOSIS — E11.22 TYPE 2 DIABETES MELLITUS WITH STAGE 3A CHRONIC KIDNEY DISEASE, WITHOUT LONG-TERM CURRENT USE OF INSULIN (HCC): ICD-10-CM

## 2024-12-04 RX ORDER — LANCETS
1 EACH MISCELLANEOUS DAILY
Qty: 100 EACH | Refills: 3 | Status: SHIPPED | OUTPATIENT
Start: 2024-12-04

## 2024-12-04 NOTE — TELEPHONE ENCOUNTER
Requested Prescriptions     Pending Prescriptions Disp Refills    ONE TOUCH ULTRASOFT LANCETS MISC 100 each 3     Si each by Does not apply route daily    Next ov 2025. Pharmacy confirmed.

## 2024-12-10 ENCOUNTER — PATIENT MESSAGE (OUTPATIENT)
Dept: INTERNAL MEDICINE CLINIC | Facility: CLINIC | Age: 84
End: 2024-12-10

## 2025-01-14 ENCOUNTER — APPOINTMENT (OUTPATIENT)
Dept: URBAN - METROPOLITAN AREA CLINIC 24 | Facility: CLINIC | Age: 85
Setting detail: DERMATOLOGY
End: 2025-01-14

## 2025-01-14 DIAGNOSIS — L57.0 ACTINIC KERATOSIS: ICD-10-CM

## 2025-01-14 PROCEDURE — ? PRESCRIPTION MEDICATION MANAGEMENT

## 2025-01-14 PROCEDURE — ? ADDITIONAL NOTES

## 2025-01-14 PROCEDURE — ? PRESCRIPTION

## 2025-01-14 PROCEDURE — 99213 OFFICE O/P EST LOW 20 MIN: CPT

## 2025-01-14 PROCEDURE — ? COUNSELING

## 2025-01-14 RX ORDER — FLUOROURACIL 5 MG/G
CREAM TOPICAL
Qty: 40 | Refills: 1 | Status: ERX | COMMUNITY
Start: 2025-01-14

## 2025-01-14 RX ADMIN — FLUOROURACIL: 5 CREAM TOPICAL at 00:00

## 2025-01-14 ASSESSMENT — LOCATION ZONE DERM
LOCATION ZONE: LEG
LOCATION ZONE: NOSE

## 2025-01-14 ASSESSMENT — LOCATION SIMPLE DESCRIPTION DERM
LOCATION SIMPLE: NOSE
LOCATION SIMPLE: RIGHT THIGH

## 2025-01-14 ASSESSMENT — LOCATION DETAILED DESCRIPTION DERM
LOCATION DETAILED: RIGHT ANTERIOR DISTAL THIGH
LOCATION DETAILED: NASAL SUPRATIP

## 2025-01-14 NOTE — PROCEDURE: PRESCRIPTION MEDICATION MANAGEMENT
Plan: Discussed treatment with field therapy in the cooler months.\\nPt has treated with  cream nightly for a couple weeks. Sending in new tube and gave a handout with detailed instructions for use.
Initiate Treatment: Fluorouracil 5 % topical cream (Apply a thin layer to AA on nose and tops of hands twice daily for 14 days. Redness and irritation will occur, apply Vaseline liberally)
Render In Strict Bullet Format?: No
Detail Level: Zone

## 2025-01-15 ENCOUNTER — PATIENT MESSAGE (OUTPATIENT)
Dept: INTERNAL MEDICINE CLINIC | Facility: CLINIC | Age: 85
End: 2025-01-15

## 2025-01-19 SDOH — ECONOMIC STABILITY: INCOME INSECURITY: IN THE LAST 12 MONTHS, WAS THERE A TIME WHEN YOU WERE NOT ABLE TO PAY THE MORTGAGE OR RENT ON TIME?: NO

## 2025-01-19 SDOH — ECONOMIC STABILITY: FOOD INSECURITY: WITHIN THE PAST 12 MONTHS, THE FOOD YOU BOUGHT JUST DIDN'T LAST AND YOU DIDN'T HAVE MONEY TO GET MORE.: NEVER TRUE

## 2025-01-19 SDOH — ECONOMIC STABILITY: FOOD INSECURITY: WITHIN THE PAST 12 MONTHS, YOU WORRIED THAT YOUR FOOD WOULD RUN OUT BEFORE YOU GOT MONEY TO BUY MORE.: NEVER TRUE

## 2025-01-19 SDOH — ECONOMIC STABILITY: TRANSPORTATION INSECURITY
IN THE PAST 12 MONTHS, HAS THE LACK OF TRANSPORTATION KEPT YOU FROM MEDICAL APPOINTMENTS OR FROM GETTING MEDICATIONS?: NO

## 2025-01-19 ASSESSMENT — PATIENT HEALTH QUESTIONNAIRE - PHQ9
SUM OF ALL RESPONSES TO PHQ QUESTIONS 1-9: 0
SUM OF ALL RESPONSES TO PHQ QUESTIONS 1-9: 0
1. LITTLE INTEREST OR PLEASURE IN DOING THINGS: NOT AT ALL
SUM OF ALL RESPONSES TO PHQ9 QUESTIONS 1 & 2: 0
1. LITTLE INTEREST OR PLEASURE IN DOING THINGS: NOT AT ALL
SUM OF ALL RESPONSES TO PHQ QUESTIONS 1-9: 0
SUM OF ALL RESPONSES TO PHQ QUESTIONS 1-9: 0
2. FEELING DOWN, DEPRESSED OR HOPELESS: NOT AT ALL
2. FEELING DOWN, DEPRESSED OR HOPELESS: NOT AT ALL
SUM OF ALL RESPONSES TO PHQ9 QUESTIONS 1 & 2: 0

## 2025-01-20 DIAGNOSIS — N18.31 TYPE 2 DIABETES MELLITUS WITH STAGE 3A CHRONIC KIDNEY DISEASE, WITHOUT LONG-TERM CURRENT USE OF INSULIN (HCC): ICD-10-CM

## 2025-01-20 DIAGNOSIS — E11.22 TYPE 2 DIABETES MELLITUS WITH STAGE 3A CHRONIC KIDNEY DISEASE, WITHOUT LONG-TERM CURRENT USE OF INSULIN (HCC): ICD-10-CM

## 2025-01-20 LAB
ALBUMIN SERPL-MCNC: 3.6 G/DL (ref 3.2–4.6)
ALBUMIN/GLOB SERPL: 1 (ref 1–1.9)
ALP SERPL-CCNC: 115 U/L (ref 35–104)
ALT SERPL-CCNC: 10 U/L (ref 8–45)
ANION GAP SERPL CALC-SCNC: 13 MMOL/L (ref 7–16)
AST SERPL-CCNC: 21 U/L (ref 15–37)
BASOPHILS # BLD: 0.05 K/UL (ref 0–0.2)
BASOPHILS NFR BLD: 0.7 % (ref 0–2)
BILIRUB SERPL-MCNC: 0.6 MG/DL (ref 0–1.2)
BUN SERPL-MCNC: 18 MG/DL (ref 8–23)
CALCIUM SERPL-MCNC: 9.8 MG/DL (ref 8.8–10.2)
CHLORIDE SERPL-SCNC: 105 MMOL/L (ref 98–107)
CHOLEST SERPL-MCNC: 176 MG/DL (ref 0–200)
CO2 SERPL-SCNC: 24 MMOL/L (ref 20–29)
CREAT SERPL-MCNC: 1.13 MG/DL (ref 0.6–1.1)
CREAT UR-MCNC: 135 MG/DL (ref 28–217)
DIFFERENTIAL METHOD BLD: ABNORMAL
EOSINOPHIL # BLD: 0.22 K/UL (ref 0–0.8)
EOSINOPHIL NFR BLD: 3.1 % (ref 0.5–7.8)
ERYTHROCYTE [DISTWIDTH] IN BLOOD BY AUTOMATED COUNT: 15.3 % (ref 11.9–14.6)
EST. AVERAGE GLUCOSE BLD GHB EST-MCNC: 136 MG/DL
GLOBULIN SER CALC-MCNC: 3.6 G/DL (ref 2.3–3.5)
GLUCOSE SERPL-MCNC: 114 MG/DL (ref 70–99)
HBA1C MFR BLD: 6.4 % (ref 0–5.6)
HCT VFR BLD AUTO: 45.7 % (ref 35.8–46.3)
HDLC SERPL-MCNC: 51 MG/DL (ref 40–60)
HDLC SERPL: 3.4 (ref 0–5)
HGB BLD-MCNC: 14.6 G/DL (ref 11.7–15.4)
IMM GRANULOCYTES # BLD AUTO: 0.03 K/UL (ref 0–0.5)
IMM GRANULOCYTES NFR BLD AUTO: 0.4 % (ref 0–5)
LDLC SERPL CALC-MCNC: 97 MG/DL (ref 0–100)
LYMPHOCYTES # BLD: 1.18 K/UL (ref 0.5–4.6)
LYMPHOCYTES NFR BLD: 16.4 % (ref 13–44)
MCH RBC QN AUTO: 29.4 PG (ref 26.1–32.9)
MCHC RBC AUTO-ENTMCNC: 31.9 G/DL (ref 31.4–35)
MCV RBC AUTO: 92.1 FL (ref 82–102)
MICROALBUMIN UR-MCNC: 3.19 MG/DL (ref 0–20)
MICROALBUMIN/CREAT UR-RTO: 24 MG/G (ref 0–30)
MONOCYTES # BLD: 0.53 K/UL (ref 0.1–1.3)
MONOCYTES NFR BLD: 7.4 % (ref 4–12)
NEUTS SEG # BLD: 5.17 K/UL (ref 1.7–8.2)
NEUTS SEG NFR BLD: 72 % (ref 43–78)
NRBC # BLD: 0 K/UL (ref 0–0.2)
PLATELET # BLD AUTO: 280 K/UL (ref 150–450)
PMV BLD AUTO: 10.4 FL (ref 9.4–12.3)
POTASSIUM SERPL-SCNC: 4 MMOL/L (ref 3.5–5.1)
PROT SERPL-MCNC: 7.2 G/DL (ref 6.3–8.2)
RBC # BLD AUTO: 4.96 M/UL (ref 4.05–5.2)
SODIUM SERPL-SCNC: 142 MMOL/L (ref 136–145)
TRIGL SERPL-MCNC: 139 MG/DL (ref 0–150)
TSH W FREE THYROID IF ABNORMAL: 3.7 UIU/ML (ref 0.27–4.2)
VLDLC SERPL CALC-MCNC: 28 MG/DL (ref 6–23)
WBC # BLD AUTO: 7.2 K/UL (ref 4.3–11.1)

## 2025-01-22 ENCOUNTER — OFFICE VISIT (OUTPATIENT)
Dept: INTERNAL MEDICINE CLINIC | Facility: CLINIC | Age: 85
End: 2025-01-22
Payer: MEDICARE

## 2025-01-22 ENCOUNTER — PATIENT MESSAGE (OUTPATIENT)
Dept: INTERNAL MEDICINE CLINIC | Facility: CLINIC | Age: 85
End: 2025-01-22

## 2025-01-22 VITALS
BODY MASS INDEX: 25.62 KG/M2 | SYSTOLIC BLOOD PRESSURE: 138 MMHG | HEART RATE: 71 BPM | OXYGEN SATURATION: 96 % | DIASTOLIC BLOOD PRESSURE: 62 MMHG | WEIGHT: 153.8 LBS | HEIGHT: 65 IN

## 2025-01-22 DIAGNOSIS — I48.0 PAROXYSMAL ATRIAL FIBRILLATION (HCC): ICD-10-CM

## 2025-01-22 DIAGNOSIS — E78.00 PURE HYPERCHOLESTEROLEMIA: Primary | ICD-10-CM

## 2025-01-22 DIAGNOSIS — E03.9 ACQUIRED HYPOTHYROIDISM: ICD-10-CM

## 2025-01-22 DIAGNOSIS — R60.0 EDEMA OF LEFT LOWER EXTREMITY: ICD-10-CM

## 2025-01-22 DIAGNOSIS — N18.31 TYPE 2 DIABETES MELLITUS WITH STAGE 3A CHRONIC KIDNEY DISEASE, WITHOUT LONG-TERM CURRENT USE OF INSULIN (HCC): ICD-10-CM

## 2025-01-22 DIAGNOSIS — I10 PRIMARY HYPERTENSION: ICD-10-CM

## 2025-01-22 DIAGNOSIS — E11.22 TYPE 2 DIABETES MELLITUS WITH STAGE 3A CHRONIC KIDNEY DISEASE, WITHOUT LONG-TERM CURRENT USE OF INSULIN (HCC): ICD-10-CM

## 2025-01-22 PROCEDURE — G8427 DOCREV CUR MEDS BY ELIG CLIN: HCPCS | Performed by: INTERNAL MEDICINE

## 2025-01-22 PROCEDURE — 3044F HG A1C LEVEL LT 7.0%: CPT | Performed by: INTERNAL MEDICINE

## 2025-01-22 PROCEDURE — G8400 PT W/DXA NO RESULTS DOC: HCPCS | Performed by: INTERNAL MEDICINE

## 2025-01-22 PROCEDURE — 1123F ACP DISCUSS/DSCN MKR DOCD: CPT | Performed by: INTERNAL MEDICINE

## 2025-01-22 PROCEDURE — 1159F MED LIST DOCD IN RCRD: CPT | Performed by: INTERNAL MEDICINE

## 2025-01-22 PROCEDURE — G8417 CALC BMI ABV UP PARAM F/U: HCPCS | Performed by: INTERNAL MEDICINE

## 2025-01-22 PROCEDURE — 1160F RVW MEDS BY RX/DR IN RCRD: CPT | Performed by: INTERNAL MEDICINE

## 2025-01-22 PROCEDURE — 3075F SYST BP GE 130 - 139MM HG: CPT | Performed by: INTERNAL MEDICINE

## 2025-01-22 PROCEDURE — 3078F DIAST BP <80 MM HG: CPT | Performed by: INTERNAL MEDICINE

## 2025-01-22 PROCEDURE — 1090F PRES/ABSN URINE INCON ASSESS: CPT | Performed by: INTERNAL MEDICINE

## 2025-01-22 PROCEDURE — 1036F TOBACCO NON-USER: CPT | Performed by: INTERNAL MEDICINE

## 2025-01-22 PROCEDURE — 99214 OFFICE O/P EST MOD 30 MIN: CPT | Performed by: INTERNAL MEDICINE

## 2025-01-22 RX ORDER — FLUOROURACIL 50 MG/G
CREAM TOPICAL
COMMUNITY
Start: 2025-01-14

## 2025-01-22 RX ORDER — LEVOTHYROXINE SODIUM 50 UG/1
50 TABLET ORAL
Qty: 90 TABLET | Refills: 1 | Status: SHIPPED | OUTPATIENT
Start: 2025-01-22

## 2025-01-22 RX ORDER — METFORMIN HYDROCHLORIDE 500 MG/1
500 TABLET, EXTENDED RELEASE ORAL DAILY
Qty: 90 TABLET | Refills: 1 | Status: SHIPPED | OUTPATIENT
Start: 2025-01-22

## 2025-01-22 RX ORDER — FUROSEMIDE 20 MG/1
20 TABLET ORAL DAILY PRN
Qty: 30 TABLET | Refills: 5 | Status: SHIPPED | OUTPATIENT
Start: 2025-01-22

## 2025-01-22 ASSESSMENT — ENCOUNTER SYMPTOMS
ABDOMINAL DISTENTION: 0
CHEST TIGHTNESS: 0
ABDOMINAL PAIN: 0
SHORTNESS OF BREATH: 0
CONSTIPATION: 0
COUGH: 0

## 2025-01-22 NOTE — ASSESSMENT & PLAN NOTE
Chronic, at goal (stable), continue current treatment plan  Diabetic Medications       Biguanides       metFORMIN (GLUCOPHAGE-XR) 500 MG extended release tablet Take 1 tablet by mouth daily

## 2025-01-22 NOTE — ASSESSMENT & PLAN NOTE
Chronic, at goal (stable), continue current treatment plan  Lipid Lowering Medications       HMG CoA Reductase Inhibitors       atorvastatin (LIPITOR) 40 MG tablet Take 0.5 tablets by mouth three times a week

## 2025-01-22 NOTE — PROGRESS NOTES
Chief Complaint   Patient presents with    Follow-up        Kristyn Houser is a 84 y.o. female who presents today for Follow-up     She is here for follow-up regarding conditions including diabetes, chronic kidney disease, hypertension,and hyperlipidemia,  She reports she had a good holiday season, was able to enjoy it her time with family and friends,  She continues to be active, playing tennis, recovering well from hip surgery,  Since last visit the left lower extremity swelling has improved,  First compliance with her medications for hypertension, including losartan, and also Eliquis for A-fib,  Tolerating metformin 500 mg, she denies any side effects,  She reports occasional sweets, but overall is trying to stick with a good balanced diet and lifestyle,  Completed her labs, discussed in detail,  She is currently taking atorvastatin half tablet every other day, and she feels some of the joint aches have improved, her LDL is slightly higher than before but is still below 100,  She has been up-to-date with her follow-ups with cardiology, and also has a orthopedic follow-up coming up in few months          Hemoglobin A1C   Date Value Ref Range Status   01/20/2025 6.4 (H) 0 - 5.6 % Final     Comment:     Reference Range  Normal       <5.7%  Prediabetes  5.7-6.4%  Diabetes     >6.4%           Wt Readings from Last 3 Encounters:   01/22/25 69.8 kg (153 lb 12.8 oz)   11/15/24 71.3 kg (157 lb 3.2 oz)   09/13/24 68 kg (150 lb)     Vitals:    01/22/25 0938   BP: 138/62   Site: Left Upper Arm   Position: Sitting   Pulse: 71   SpO2: 96%   Weight: 69.8 kg (153 lb 12.8 oz)   Height: 1.651 m (5' 5\")        Assessment and plan:  1. Pure hypercholesterolemia  Assessment & Plan:   Chronic, at goal (stable), continue current treatment plan  Lipid Lowering Medications       HMG CoA Reductase Inhibitors       atorvastatin (LIPITOR) 40 MG tablet Take 0.5 tablets by mouth three times a week            Orders:  -     CBC

## 2025-01-22 NOTE — ASSESSMENT & PLAN NOTE
Monitored by specialist- no acute findings meriting change in the plan  Cardiac Medications       Nitrates       nitroGLYCERIN (NITROSTAT) 0.4 MG SL tablet Place 1 tablet under the tongue every 5 minutes as needed for Chest pain       Beta Blockers Cardio-Selective       metoprolol succinate (TOPROL XL) 100 MG extended release tablet Take 1 tablet by mouth daily       Loop Diuretics       furosemide (LASIX) 20 MG tablet Take 1 tablet by mouth daily as needed (as needed)       HMG CoA Reductase Inhibitors       atorvastatin (LIPITOR) 40 MG tablet Take 0.5 tablets by mouth three times a week       Angiotensin II Receptor Antagonists       losartan (COZAAR) 100 MG tablet Take 1 tablet by mouth daily       Salicylates       aspirin 81 MG chewable tablet (Discontinued) Take 1 tablet by mouth daily       Direct Factor Xa Inhibitors       apixaban (ELIQUIS) 5 MG TABS tablet Take 1 tablet by mouth 2 times daily

## 2025-01-27 RX ORDER — LOSARTAN POTASSIUM 100 MG/1
100 TABLET ORAL DAILY
Qty: 90 TABLET | Refills: 3 | Status: SHIPPED | OUTPATIENT
Start: 2025-01-27

## 2025-02-21 DIAGNOSIS — E03.9 ACQUIRED HYPOTHYROIDISM: ICD-10-CM

## 2025-02-21 RX ORDER — LEVOTHYROXINE SODIUM 50 UG/1
50 TABLET ORAL
Qty: 90 TABLET | Refills: 1 | Status: SHIPPED | OUTPATIENT
Start: 2025-02-21

## 2025-04-14 DIAGNOSIS — N18.31 TYPE 2 DIABETES MELLITUS WITH STAGE 3A CHRONIC KIDNEY DISEASE, WITHOUT LONG-TERM CURRENT USE OF INSULIN (HCC): ICD-10-CM

## 2025-04-14 DIAGNOSIS — E11.22 TYPE 2 DIABETES MELLITUS WITH STAGE 3A CHRONIC KIDNEY DISEASE, WITHOUT LONG-TERM CURRENT USE OF INSULIN (HCC): ICD-10-CM

## 2025-04-14 RX ORDER — METFORMIN HYDROCHLORIDE 500 MG/1
500 TABLET, EXTENDED RELEASE ORAL DAILY
Qty: 90 TABLET | Refills: 1 | Status: SHIPPED | OUTPATIENT
Start: 2025-04-14

## 2025-04-14 NOTE — TELEPHONE ENCOUNTER
Requested Prescriptions     Pending Prescriptions Disp Refills    metFORMIN (GLUCOPHAGE-XR) 500 MG extended release tablet 90 tablet 1     Sig: Take 1 tablet by mouth daily     Next ov 07/24/2025. Pharmacy confirmed.

## 2025-04-22 ENCOUNTER — OFFICE VISIT (OUTPATIENT)
Dept: ORTHOPEDIC SURGERY | Age: 85
End: 2025-04-22
Payer: MEDICARE

## 2025-04-22 DIAGNOSIS — Z96.641 STATUS POST RIGHT HIP REPLACEMENT: Primary | ICD-10-CM

## 2025-04-22 PROCEDURE — 99213 OFFICE O/P EST LOW 20 MIN: CPT | Performed by: ORTHOPAEDIC SURGERY

## 2025-04-22 PROCEDURE — 1090F PRES/ABSN URINE INCON ASSESS: CPT | Performed by: ORTHOPAEDIC SURGERY

## 2025-04-22 PROCEDURE — 1123F ACP DISCUSS/DSCN MKR DOCD: CPT | Performed by: ORTHOPAEDIC SURGERY

## 2025-04-22 PROCEDURE — G8428 CUR MEDS NOT DOCUMENT: HCPCS | Performed by: ORTHOPAEDIC SURGERY

## 2025-04-22 PROCEDURE — G8417 CALC BMI ABV UP PARAM F/U: HCPCS | Performed by: ORTHOPAEDIC SURGERY

## 2025-04-22 PROCEDURE — 1036F TOBACCO NON-USER: CPT | Performed by: ORTHOPAEDIC SURGERY

## 2025-04-22 PROCEDURE — G8400 PT W/DXA NO RESULTS DOC: HCPCS | Performed by: ORTHOPAEDIC SURGERY

## 2025-04-22 NOTE — PROGRESS NOTES
tablet by mouth daily  11/23/23  Provider, MD Kianna   nitroGLYCERIN (NITROSTAT) 0.4 MG SL tablet Place 1 tablet under the tongue every 5 minutes as needed for Chest pain 1/30/23   Kristyn Green MD       Family History:     Family History   Problem Relation Age of Onset    Heart Disease Mother     Diabetes Mother     Hypertension Mother     Heart Disease Father         CHF    Diabetes Father     Cancer Sister         lung cancer    Diabetes Maternal Grandfather        Social History:      Social History     Tobacco Use    Smoking status: Never     Passive exposure: Past (Dad, heavy smoker)    Smokeless tobacco: Never   Substance Use Topics    Alcohol use: Yes     Alcohol/week: 3.0 standard drinks of alcohol     Types: 3 Shots of liquor per week     Comment: Very little consumption of alcohol -           Allergies:      Allergies   Allergen Reactions    Codeine Nausea And Vomiting            ROS:  Patient Health Form has been filled out, reviewed, signed and included in the chart.  ROS findings related to musculoskeletal problems were discussed with the patient.  Patient advised to discuss non-orthopedic complaints with primary care physician.    ROS:Patient Health Form has been filled out, reviewed, signed and included in the chart.  ROS findings related to musculoskeletal problems were discussed with the patient.  Patient advised to discuss non-orthopedic complaints with primary care physician.    PE:  Incision is examined which is healing well.  No erythema, induration or drainage.  Distally the patient is able to grossly plantar and dorsiflex foot and ankle.  Sensation intact.  Limb is perfused.  No sign of DVT.    X-RAYS:    XR Pelvis 2/3 Views  Views Obtained: AP Pelvis and Frog leg lateral of right hip  Indication: Postop right Total hip Replacement  Findings:   X-rays are viewed which show total hip replacement in place on the right side.  All hardware appears to be stable compared to immediate

## 2025-04-28 DIAGNOSIS — Z96.641 STATUS POST RIGHT HIP REPLACEMENT: Primary | ICD-10-CM

## 2025-04-28 RX ORDER — AMOXICILLIN 500 MG/1
TABLET, FILM COATED ORAL
Qty: 8 TABLET | Refills: 2 | Status: SHIPPED | OUTPATIENT
Start: 2025-04-28

## 2025-05-12 NOTE — PROGRESS NOTES
[facility] CARDIOLOGY  [address] [place] [address], [address]  [place] [address]  PHONE: [phone]      25    NAME:  Kristyn Houser  : 1940  MRN: [id number]         SUBJECTIVE:   Kristyn Houser is a 84 y.o. female seen for a follow up visit regarding the following:     Chief Complaint   Patient presents with    Follow-up    Atrial Fibrillation    Hyperlipidemia    Hypertension            HPI:  Follow up  Follow-up, Atrial Fibrillation, Hyperlipidemia, and Hypertension   .    Follow up CAD/CABG and afib.      She still plays tennis and has had no chest discomfort, dyspnea.  She just finished pruning 30+ plants around her home, many of the them on an incline.  Delightful lady, remains active, travels, reads, engaged.         Past cardiac history:   13 (Dr. Iglesias)        LIMA-LAD, SVG-Dx, SVG-OM, SVG-rPDA   2020-        7 day monitor -1 brief atrial fib/flutter with variable heart rate  2022-        2 week monitor -9% burden AF/typical flutter   Avg rate afib/flutter is ~125.  Reported symptoms generally correlate with RVR.               Cardiac Medications       Nitrates       nitroGLYCERIN (NITROSTAT) 0.4 MG SL tablet Place 1 tablet under the tongue every 5 minutes as needed for Chest pain       Beta Blockers Cardio-Selective       metoprolol succinate (TOPROL XL) 100 MG extended release tablet Take 1 tablet by mouth daily       Loop Diuretics       furosemide (LASIX) 20 MG tablet Take 1 tablet by mouth daily as needed (as needed)       HMG CoA Reductase Inhibitors       atorvastatin (LIPITOR) 20 MG tablet Take 1 tablet by mouth three times a week       Angiotensin II Receptor Antagonists       losartan (COZAAR) 100 MG tablet TAKE 1 TABLET BY MOUTH EVERY DAY       Intestinal Cholesterol Absorption Inhibitors       ezetimibe (ZETIA) 10 MG tablet Take 1 tablet by mouth daily       Salicylates       aspirin 81 MG chewable tablet (Discontinued) Take 1 tablet by mouth daily

## 2025-05-13 ENCOUNTER — OFFICE VISIT (OUTPATIENT)
Age: 85
End: 2025-05-13
Payer: MEDICARE

## 2025-05-13 VITALS
WEIGHT: 156 LBS | DIASTOLIC BLOOD PRESSURE: 70 MMHG | HEART RATE: 71 BPM | BODY MASS INDEX: 25.99 KG/M2 | HEIGHT: 65 IN | SYSTOLIC BLOOD PRESSURE: 138 MMHG

## 2025-05-13 DIAGNOSIS — I48.0 PAROXYSMAL ATRIAL FIBRILLATION (HCC): Primary | ICD-10-CM

## 2025-05-13 DIAGNOSIS — E11.22 TYPE 2 DIABETES MELLITUS WITH STAGE 3A CHRONIC KIDNEY DISEASE, WITHOUT LONG-TERM CURRENT USE OF INSULIN (HCC): ICD-10-CM

## 2025-05-13 DIAGNOSIS — N18.31 TYPE 2 DIABETES MELLITUS WITH STAGE 3A CHRONIC KIDNEY DISEASE, WITHOUT LONG-TERM CURRENT USE OF INSULIN (HCC): ICD-10-CM

## 2025-05-13 DIAGNOSIS — E78.00 PURE HYPERCHOLESTEROLEMIA: ICD-10-CM

## 2025-05-13 PROCEDURE — 1090F PRES/ABSN URINE INCON ASSESS: CPT | Performed by: INTERNAL MEDICINE

## 2025-05-13 PROCEDURE — 1159F MED LIST DOCD IN RCRD: CPT | Performed by: INTERNAL MEDICINE

## 2025-05-13 PROCEDURE — G8417 CALC BMI ABV UP PARAM F/U: HCPCS | Performed by: INTERNAL MEDICINE

## 2025-05-13 PROCEDURE — G8427 DOCREV CUR MEDS BY ELIG CLIN: HCPCS | Performed by: INTERNAL MEDICINE

## 2025-05-13 PROCEDURE — 3075F SYST BP GE 130 - 139MM HG: CPT | Performed by: INTERNAL MEDICINE

## 2025-05-13 PROCEDURE — 93000 ELECTROCARDIOGRAM COMPLETE: CPT | Performed by: INTERNAL MEDICINE

## 2025-05-13 PROCEDURE — 1123F ACP DISCUSS/DSCN MKR DOCD: CPT | Performed by: INTERNAL MEDICINE

## 2025-05-13 PROCEDURE — 3078F DIAST BP <80 MM HG: CPT | Performed by: INTERNAL MEDICINE

## 2025-05-13 PROCEDURE — 1036F TOBACCO NON-USER: CPT | Performed by: INTERNAL MEDICINE

## 2025-05-13 PROCEDURE — 3044F HG A1C LEVEL LT 7.0%: CPT | Performed by: INTERNAL MEDICINE

## 2025-05-13 PROCEDURE — G8400 PT W/DXA NO RESULTS DOC: HCPCS | Performed by: INTERNAL MEDICINE

## 2025-05-13 PROCEDURE — 1126F AMNT PAIN NOTED NONE PRSNT: CPT | Performed by: INTERNAL MEDICINE

## 2025-05-13 PROCEDURE — 99214 OFFICE O/P EST MOD 30 MIN: CPT | Performed by: INTERNAL MEDICINE

## 2025-05-13 RX ORDER — NITROGLYCERIN 0.4 MG/1
0.4 TABLET SUBLINGUAL EVERY 5 MIN PRN
Qty: 25 TABLET | Refills: 5 | Status: SHIPPED | OUTPATIENT
Start: 2025-05-13

## 2025-05-13 RX ORDER — METOPROLOL SUCCINATE 100 MG/1
100 TABLET, EXTENDED RELEASE ORAL DAILY
Qty: 90 TABLET | Refills: 3 | Status: SHIPPED | OUTPATIENT
Start: 2025-05-13

## 2025-05-13 RX ORDER — EZETIMIBE 10 MG/1
10 TABLET ORAL DAILY
Qty: 90 TABLET | Refills: 3 | Status: SHIPPED | OUTPATIENT
Start: 2025-05-13

## 2025-05-13 ASSESSMENT — ENCOUNTER SYMPTOMS: SHORTNESS OF BREATH: 0

## 2025-05-26 ENCOUNTER — PATIENT MESSAGE (OUTPATIENT)
Age: 85
End: 2025-05-26

## 2025-05-27 ENCOUNTER — TELEPHONE (OUTPATIENT)
Age: 85
End: 2025-05-27

## 2025-05-27 NOTE — TELEPHONE ENCOUNTER
Kristyn Green MD Keener, Lynn F RN  Caller: Unspecified (Today, 10:37 AM)  I appreciate the good wishes and the response.  She's given it the old college try, and was feeling much better on just thrice weekly atorvastatin.  At 84 and active, I'd rather keep her that way than push for the \"perfect\" cholesterol panel.  Her LDL is < 100, goal is < 70 but again, would rather her continue to feel well and stay active.  Since she was kind enough to try it, I'd say let's just stay the course on the lower dose, keep playing tennis and follow healthy diet.

## 2025-05-27 NOTE — TELEPHONE ENCOUNTER
Ezetimibe/Zetia  (Newest Message First)               5/27/25 10:25 AM  Mable Linton MA routed this conversation to [facility] [place] Cardiology Triage (Selected Message)    5/27/25  8:48 AM  Mable Linton MA routed this conversation to Mable Linton MA Barbara Lynn McGrey to [facility] Cardiology Clinical Staff (supporting Kristyn Green MD)        5/26/25 10:37 AM  Dr. Green,     I trust you had a great time in [place] seeing your daughter graduate.  Congratulations to Laurita!     I have been on Ezetimibe for almost two weeks.    After another night with waking up to nausea, I have decided to let you know I do not like this medication.  I have been blessed in my latter years with meds not bothering me but this one is different.  It also created some body aches and fatigue.  My tennis game last Friday was awful.  The gals even asked if I was okay!     I am willing to try a different dosage of the Atorvastatin, if you feel it would reduce the cholesterol numbers.     Kristyn Houser

## 2025-06-09 ENCOUNTER — OFFICE VISIT (OUTPATIENT)
Dept: INTERNAL MEDICINE CLINIC | Facility: CLINIC | Age: 85
End: 2025-06-09
Payer: MEDICARE

## 2025-06-09 ENCOUNTER — PATIENT MESSAGE (OUTPATIENT)
Dept: INTERNAL MEDICINE CLINIC | Facility: CLINIC | Age: 85
End: 2025-06-09

## 2025-06-09 VITALS
TEMPERATURE: 97.2 F | SYSTOLIC BLOOD PRESSURE: 134 MMHG | DIASTOLIC BLOOD PRESSURE: 82 MMHG | HEIGHT: 65 IN | WEIGHT: 153 LBS | HEART RATE: 66 BPM | BODY MASS INDEX: 25.49 KG/M2 | OXYGEN SATURATION: 95 %

## 2025-06-09 DIAGNOSIS — E11.22 TYPE 2 DIABETES MELLITUS WITH STAGE 3A CHRONIC KIDNEY DISEASE, WITHOUT LONG-TERM CURRENT USE OF INSULIN (HCC): ICD-10-CM

## 2025-06-09 DIAGNOSIS — N18.31 TYPE 2 DIABETES MELLITUS WITH STAGE 3A CHRONIC KIDNEY DISEASE, WITHOUT LONG-TERM CURRENT USE OF INSULIN (HCC): ICD-10-CM

## 2025-06-09 DIAGNOSIS — N18.31 TYPE 2 DIABETES MELLITUS WITH STAGE 3A CHRONIC KIDNEY DISEASE, WITHOUT LONG-TERM CURRENT USE OF INSULIN (HCC): Chronic | ICD-10-CM

## 2025-06-09 DIAGNOSIS — M79.89 LEFT LEG SWELLING: ICD-10-CM

## 2025-06-09 DIAGNOSIS — M79.89 LEFT LEG SWELLING: Primary | Chronic | ICD-10-CM

## 2025-06-09 DIAGNOSIS — I48.0 PAROXYSMAL ATRIAL FIBRILLATION (HCC): Chronic | ICD-10-CM

## 2025-06-09 DIAGNOSIS — I48.0 PAROXYSMAL ATRIAL FIBRILLATION (HCC): ICD-10-CM

## 2025-06-09 DIAGNOSIS — E11.22 TYPE 2 DIABETES MELLITUS WITH STAGE 3A CHRONIC KIDNEY DISEASE, WITHOUT LONG-TERM CURRENT USE OF INSULIN (HCC): Chronic | ICD-10-CM

## 2025-06-09 LAB
ALBUMIN SERPL-MCNC: 4 G/DL (ref 3.2–4.6)
ALBUMIN/GLOB SERPL: 1.1 (ref 1–1.9)
ALP SERPL-CCNC: 111 U/L (ref 35–104)
ALT SERPL-CCNC: 11 U/L (ref 8–45)
ANION GAP SERPL CALC-SCNC: 13 MMOL/L (ref 7–16)
AST SERPL-CCNC: 25 U/L (ref 15–37)
BASOPHILS # BLD: 0.07 K/UL (ref 0–0.2)
BASOPHILS NFR BLD: 0.9 % (ref 0–2)
BILIRUB SERPL-MCNC: 0.6 MG/DL (ref 0–1.2)
BUN SERPL-MCNC: 18 MG/DL (ref 8–23)
CALCIUM SERPL-MCNC: 10.2 MG/DL (ref 8.8–10.2)
CHLORIDE SERPL-SCNC: 102 MMOL/L (ref 98–107)
CO2 SERPL-SCNC: 25 MMOL/L (ref 20–29)
CREAT SERPL-MCNC: 1.16 MG/DL (ref 0.6–1.1)
DIFFERENTIAL METHOD BLD: ABNORMAL
EOSINOPHIL # BLD: 0.42 K/UL (ref 0–0.8)
EOSINOPHIL NFR BLD: 5.2 % (ref 0.5–7.8)
ERYTHROCYTE [DISTWIDTH] IN BLOOD BY AUTOMATED COUNT: 15 % (ref 11.9–14.6)
EST. AVERAGE GLUCOSE BLD GHB EST-MCNC: 132 MG/DL
GLOBULIN SER CALC-MCNC: 3.5 G/DL (ref 2.3–3.5)
GLUCOSE SERPL-MCNC: 101 MG/DL (ref 70–99)
HBA1C MFR BLD: 6.2 % (ref 0–5.6)
HCT VFR BLD AUTO: 45.9 % (ref 35.8–46.3)
HGB BLD-MCNC: 14.7 G/DL (ref 11.7–15.4)
IMM GRANULOCYTES # BLD AUTO: 0.02 K/UL (ref 0–0.5)
IMM GRANULOCYTES NFR BLD AUTO: 0.2 % (ref 0–5)
LYMPHOCYTES # BLD: 1.94 K/UL (ref 0.5–4.6)
LYMPHOCYTES NFR BLD: 24 % (ref 13–44)
MAGNESIUM SERPL-MCNC: 2 MG/DL (ref 1.8–2.4)
MCH RBC QN AUTO: 29.6 PG (ref 26.1–32.9)
MCHC RBC AUTO-ENTMCNC: 32 G/DL (ref 31.4–35)
MCV RBC AUTO: 92.5 FL (ref 82–102)
MONOCYTES # BLD: 0.75 K/UL (ref 0.1–1.3)
MONOCYTES NFR BLD: 9.3 % (ref 4–12)
NEUTS SEG # BLD: 4.9 K/UL (ref 1.7–8.2)
NEUTS SEG NFR BLD: 60.4 % (ref 43–78)
NRBC # BLD: 0 K/UL (ref 0–0.2)
PLATELET # BLD AUTO: 264 K/UL (ref 150–450)
PMV BLD AUTO: 10.8 FL (ref 9.4–12.3)
POTASSIUM SERPL-SCNC: 4 MMOL/L (ref 3.5–5.1)
PROT SERPL-MCNC: 7.6 G/DL (ref 6.3–8.2)
RBC # BLD AUTO: 4.96 M/UL (ref 4.05–5.2)
SODIUM SERPL-SCNC: 140 MMOL/L (ref 136–145)
WBC # BLD AUTO: 8.1 K/UL (ref 4.3–11.1)

## 2025-06-09 PROCEDURE — 3075F SYST BP GE 130 - 139MM HG: CPT | Performed by: NURSE PRACTITIONER

## 2025-06-09 PROCEDURE — G2211 COMPLEX E/M VISIT ADD ON: HCPCS | Performed by: NURSE PRACTITIONER

## 2025-06-09 PROCEDURE — 3079F DIAST BP 80-89 MM HG: CPT | Performed by: NURSE PRACTITIONER

## 2025-06-09 PROCEDURE — 1123F ACP DISCUSS/DSCN MKR DOCD: CPT | Performed by: NURSE PRACTITIONER

## 2025-06-09 PROCEDURE — 1160F RVW MEDS BY RX/DR IN RCRD: CPT | Performed by: NURSE PRACTITIONER

## 2025-06-09 PROCEDURE — 99214 OFFICE O/P EST MOD 30 MIN: CPT | Performed by: NURSE PRACTITIONER

## 2025-06-09 PROCEDURE — 3044F HG A1C LEVEL LT 7.0%: CPT | Performed by: NURSE PRACTITIONER

## 2025-06-09 PROCEDURE — 1036F TOBACCO NON-USER: CPT | Performed by: NURSE PRACTITIONER

## 2025-06-09 PROCEDURE — G8417 CALC BMI ABV UP PARAM F/U: HCPCS | Performed by: NURSE PRACTITIONER

## 2025-06-09 PROCEDURE — G8400 PT W/DXA NO RESULTS DOC: HCPCS | Performed by: NURSE PRACTITIONER

## 2025-06-09 PROCEDURE — 1090F PRES/ABSN URINE INCON ASSESS: CPT | Performed by: NURSE PRACTITIONER

## 2025-06-09 PROCEDURE — G8427 DOCREV CUR MEDS BY ELIG CLIN: HCPCS | Performed by: NURSE PRACTITIONER

## 2025-06-09 PROCEDURE — 1159F MED LIST DOCD IN RCRD: CPT | Performed by: NURSE PRACTITIONER

## 2025-06-09 NOTE — PROGRESS NOTES
20 mg as needed; if swelling persists or worsens, further evaluation may be necessary.    2. Paroxysmal atrial fibrillation (HCC).  - Rate controlled on metoprolol; anticoagulated on Eliquis.    3. 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  - Labs today - will contact with results.    Orders Placed This Encounter   Procedures    Comprehensive Metabolic Panel     Standing Status:   Future     Expected Date:   6/9/2025     Expiration Date:   6/9/2026    CBC with Auto Differential     Standing Status:   Future     Expected Date:   6/9/2025     Expiration Date:   6/9/2026    Hemoglobin A1C     Standing Status:   Future     Expected Date:   6/9/2025     Expiration Date:   6/9/2026    Magnesium     Standing Status:   Future     Expected Date:   6/9/2025     Expiration Date:   6/9/2026     Return in about 6 weeks (around 7/24/2025), or if symptoms worsen or fail to improve, for Dr. Hale, as scheduled.    The patient (or guardian, if applicable) and other individuals in attendance with the patient were advised that Artificial Intelligence will be utilized during this visit to record, process the conversation to generate a clinical note, and support improvement of the AI technology. The patient (or guardian, if applicable) and other individuals in attendance at the appointment consented to the use of AI, including the recording.    Julia S Paduano, APRN - CNP

## 2025-06-10 ENCOUNTER — RESULTS FOLLOW-UP (OUTPATIENT)
Dept: INTERNAL MEDICINE CLINIC | Facility: CLINIC | Age: 85
End: 2025-06-10

## 2025-07-15 ENCOUNTER — APPOINTMENT (OUTPATIENT)
Dept: URBAN - METROPOLITAN AREA CLINIC 24 | Facility: CLINIC | Age: 85
Setting detail: DERMATOLOGY
End: 2025-07-15

## 2025-07-15 DIAGNOSIS — D18.0 HEMANGIOMA: ICD-10-CM

## 2025-07-15 DIAGNOSIS — T07XXXA ABRASION OR FRICTION BURN OF OTHER, MULTIPLE, AND UNSPECIFIED SITES, WITHOUT MENTION OF INFECTION: ICD-10-CM

## 2025-07-15 DIAGNOSIS — D22 MELANOCYTIC NEVI: ICD-10-CM

## 2025-07-15 DIAGNOSIS — L82.1 OTHER SEBORRHEIC KERATOSIS: ICD-10-CM

## 2025-07-15 DIAGNOSIS — L57.0 ACTINIC KERATOSIS: ICD-10-CM

## 2025-07-15 DIAGNOSIS — Z85.828 PERSONAL HISTORY OF OTHER MALIGNANT NEOPLASM OF SKIN: ICD-10-CM

## 2025-07-15 DIAGNOSIS — L81.4 OTHER MELANIN HYPERPIGMENTATION: ICD-10-CM

## 2025-07-15 DIAGNOSIS — Z71.89 OTHER SPECIFIED COUNSELING: ICD-10-CM

## 2025-07-15 DIAGNOSIS — L57.8 OTHER SKIN CHANGES DUE TO CHRONIC EXPOSURE TO NONIONIZING RADIATION: ICD-10-CM

## 2025-07-15 PROBLEM — S80.211A ABRASION, RIGHT KNEE, INITIAL ENCOUNTER: Status: ACTIVE | Noted: 2025-07-15

## 2025-07-15 PROBLEM — D22.5 MELANOCYTIC NEVI OF TRUNK: Status: ACTIVE | Noted: 2025-07-15

## 2025-07-15 PROBLEM — D18.01 HEMANGIOMA OF SKIN AND SUBCUTANEOUS TISSUE: Status: ACTIVE | Noted: 2025-07-15

## 2025-07-15 PROCEDURE — ? COUNSELING

## 2025-07-15 PROCEDURE — ? PRESCRIPTION MEDICATION MANAGEMENT

## 2025-07-15 PROCEDURE — ? OTHER

## 2025-07-15 ASSESSMENT — LOCATION DETAILED DESCRIPTION DERM
LOCATION DETAILED: RIGHT CENTRAL MALAR CHEEK
LOCATION DETAILED: NASAL SUPRATIP
LOCATION DETAILED: SUPERIOR THORACIC SPINE
LOCATION DETAILED: EPIGASTRIC SKIN
LOCATION DETAILED: RIGHT KNEE
LOCATION DETAILED: RIGHT ULNAR DORSAL HAND
LOCATION DETAILED: STERNAL NOTCH
LOCATION DETAILED: INFERIOR THORACIC SPINE
LOCATION DETAILED: LEFT CENTRAL MALAR CHEEK

## 2025-07-15 ASSESSMENT — LOCATION SIMPLE DESCRIPTION DERM
LOCATION SIMPLE: LEFT CHEEK
LOCATION SIMPLE: ABDOMEN
LOCATION SIMPLE: NOSE
LOCATION SIMPLE: UPPER BACK
LOCATION SIMPLE: RIGHT CHEEK
LOCATION SIMPLE: CHEST
LOCATION SIMPLE: RIGHT HAND
LOCATION SIMPLE: RIGHT KNEE

## 2025-07-15 ASSESSMENT — LOCATION ZONE DERM
LOCATION ZONE: FACE
LOCATION ZONE: HAND
LOCATION ZONE: NOSE
LOCATION ZONE: TRUNK
LOCATION ZONE: LEG

## 2025-07-15 NOTE — PROCEDURE: PRESCRIPTION MEDICATION MANAGEMENT
Plan: fluorouracil 5 % topical cream (Apply a thin layer to AA on nose twice daily for 14 days.\\nRedness and irritation will occur, apply Vaseline liberally in the month(s) of December/January
Render In Strict Bullet Format?: No
Detail Level: Zone
Plan: Apply CeraVe Healing Ointment twice daily to AA\\n\\nShe has been working in the yard and this lesion appears to be traumatic. If not healing over the next month RTC.
Initiate Treatment: Apply CeraVe Healing Ointment twice daily to AA

## 2025-08-14 DIAGNOSIS — E11.22 TYPE 2 DIABETES MELLITUS WITH DIABETIC CHRONIC KIDNEY DISEASE, UNSPECIFIED CKD STAGE, UNSPECIFIED WHETHER LONG TERM INSULIN USE (HCC): ICD-10-CM

## 2025-08-14 RX ORDER — GLUCOSAMINE HCL/CHONDROITIN SU 500-400 MG
1 CAPSULE ORAL DAILY
Qty: 100 STRIP | Refills: 3 | Status: SHIPPED | OUTPATIENT
Start: 2025-08-14

## 2025-08-18 SDOH — HEALTH STABILITY: PHYSICAL HEALTH: ON AVERAGE, HOW MANY DAYS PER WEEK DO YOU ENGAGE IN MODERATE TO STRENUOUS EXERCISE (LIKE A BRISK WALK)?: 7 DAYS

## 2025-08-18 SDOH — HEALTH STABILITY: PHYSICAL HEALTH: ON AVERAGE, HOW MANY MINUTES DO YOU ENGAGE IN EXERCISE AT THIS LEVEL?: 30 MIN

## 2025-08-18 ASSESSMENT — PATIENT HEALTH QUESTIONNAIRE - PHQ9
SUM OF ALL RESPONSES TO PHQ QUESTIONS 1-9: 0
2. FEELING DOWN, DEPRESSED OR HOPELESS: NOT AT ALL
1. LITTLE INTEREST OR PLEASURE IN DOING THINGS: NOT AT ALL
SUM OF ALL RESPONSES TO PHQ QUESTIONS 1-9: 0

## 2025-08-18 ASSESSMENT — LIFESTYLE VARIABLES
HOW OFTEN DO YOU HAVE A DRINK CONTAINING ALCOHOL: 2-3 TIMES A WEEK
HOW OFTEN DO YOU HAVE SIX OR MORE DRINKS ON ONE OCCASION: 1
HOW MANY STANDARD DRINKS CONTAINING ALCOHOL DO YOU HAVE ON A TYPICAL DAY: 1
HOW OFTEN DO YOU HAVE A DRINK CONTAINING ALCOHOL: 4
HOW MANY STANDARD DRINKS CONTAINING ALCOHOL DO YOU HAVE ON A TYPICAL DAY: 1 OR 2

## 2025-08-25 ENCOUNTER — OFFICE VISIT (OUTPATIENT)
Dept: INTERNAL MEDICINE CLINIC | Facility: CLINIC | Age: 85
End: 2025-08-25

## 2025-08-25 VITALS
OXYGEN SATURATION: 96 % | HEART RATE: 64 BPM | BODY MASS INDEX: 25.49 KG/M2 | HEIGHT: 65 IN | SYSTOLIC BLOOD PRESSURE: 130 MMHG | DIASTOLIC BLOOD PRESSURE: 78 MMHG | WEIGHT: 153 LBS

## 2025-08-25 DIAGNOSIS — I48.0 PAROXYSMAL ATRIAL FIBRILLATION (HCC): ICD-10-CM

## 2025-08-25 DIAGNOSIS — N18.31 TYPE 2 DIABETES MELLITUS WITH STAGE 3A CHRONIC KIDNEY DISEASE, WITHOUT LONG-TERM CURRENT USE OF INSULIN (HCC): ICD-10-CM

## 2025-08-25 DIAGNOSIS — Z00.00 MEDICARE ANNUAL WELLNESS VISIT, SUBSEQUENT: Primary | ICD-10-CM

## 2025-08-25 DIAGNOSIS — E11.22 TYPE 2 DIABETES MELLITUS WITH STAGE 3A CHRONIC KIDNEY DISEASE, WITHOUT LONG-TERM CURRENT USE OF INSULIN (HCC): ICD-10-CM

## 2025-08-25 DIAGNOSIS — E03.9 ACQUIRED HYPOTHYROIDISM: ICD-10-CM

## 2025-08-25 DIAGNOSIS — E78.2 MIXED HYPERLIPIDEMIA: ICD-10-CM

## (undated) DEVICE — MINOR LITHOTOMY PACK: Brand: MEDLINE INDUSTRIES, INC.

## (undated) DEVICE — COLUMN DRAPE

## (undated) DEVICE — SOLUTION IRRIG 3000ML 0.9% SOD CHL USP UROMATIC PLAS CONT

## (undated) DEVICE — [HIGH FLOW INSUFFLATOR,  DO NOT USE IF PACKAGE IS DAMAGED,  KEEP DRY,  KEEP AWAY FROM SUNLIGHT,  PROTECT FROM HEAT AND RADIOACTIVE SOURCES.]: Brand: PNEUMOSURE

## (undated) DEVICE — CARDINAL HEALTH FLEXIBLE LIGHT HANDLE COVER: Brand: CARDINAL HEALTH

## (undated) DEVICE — CANNULA SEAL

## (undated) DEVICE — TROCAR: Brand: KII FIOS FIRST ENTRY

## (undated) DEVICE — SKIN PREP TRAY 4 COMPARTM TRAY: Brand: MEDLINE INDUSTRIES, INC.

## (undated) DEVICE — ARM DRAPE

## (undated) DEVICE — ROBOTIC PROSTATE: Brand: MEDLINE INDUSTRIES, INC.

## (undated) DEVICE — VCARE MEDIUM, UTERINE MANIPULATOR, VAGINAL-CERVICAL-AHLUWALIA'S-RETRACTOR-ELEVATOR: Brand: VCARE

## (undated) DEVICE — GLOVE SURG SZ 65 THK91MIL LTX FREE SYN POLYISOPRENE

## (undated) DEVICE — AGENT HEMSTAT 3GM OXIDIZED REGENERATED CELOS ABSRB FOR CONT (ORDER MULTIPLES OF 5EA)

## (undated) DEVICE — SOLUTION ANTIFOG VIS SYS CLEARIFY LAPSCP

## (undated) DEVICE — PAD PT POS 36 IN SURGYPAD DISP

## (undated) DEVICE — DRAPE,UNDERBUTTOCKS,PCH,STERILE: Brand: MEDLINE

## (undated) DEVICE — TIP COVER ACCESSORY

## (undated) DEVICE — BAG,DRAINAGE,4L,A/R TOWER,LL,SLIDE TAP: Brand: MEDLINE

## (undated) DEVICE — KIT SUTURING DEVICE M-CLOSE

## (undated) DEVICE — SOLUTION IRRIG 1000ML STRL H2O USP PLAS POUR BTL

## (undated) DEVICE — SUTURE VCRL SZ 4-0 L27IN ABSRB UD L19MM PS-2 3/8 CIR PRIM J426H

## (undated) DEVICE — DRAPE,TOP,102X53,STERILE: Brand: MEDLINE

## (undated) DEVICE — BLADELESS OBTURATOR: Brand: WECK VISTA

## (undated) DEVICE — SUTURE DEV SZ 2-0 WND CLSR ABSRB GS-22 VLOC COVIDIEN VLOCM2145